# Patient Record
Sex: MALE | Race: WHITE | NOT HISPANIC OR LATINO | URBAN - METROPOLITAN AREA
[De-identification: names, ages, dates, MRNs, and addresses within clinical notes are randomized per-mention and may not be internally consistent; named-entity substitution may affect disease eponyms.]

---

## 2017-01-24 ENCOUNTER — INPATIENT HOSPITAL (OUTPATIENT)
Dept: URBAN - METROPOLITAN AREA HOSPITAL 76 | Facility: HOSPITAL | Age: 57
End: 2017-01-24
Payer: COMMERCIAL

## 2017-01-24 DIAGNOSIS — K29.20 ALCOHOLIC GASTRITIS WITHOUT BLEEDING: ICD-10-CM

## 2017-01-24 DIAGNOSIS — K22.70 BARRETT'S ESOPHAGUS WITHOUT DYSPLASIA: ICD-10-CM

## 2017-01-24 DIAGNOSIS — K92.0 HEMATEMESIS: ICD-10-CM

## 2017-01-24 DIAGNOSIS — K44.9 DIAPHRAGMATIC HERNIA WITHOUT OBSTRUCTION OR GANGRENE: ICD-10-CM

## 2017-01-24 DIAGNOSIS — K22.10 ULCER OF ESOPHAGUS WITHOUT BLEEDING: ICD-10-CM

## 2017-01-24 PROCEDURE — 43235 EGD DIAGNOSTIC BRUSH WASH: CPT | Performed by: INTERNAL MEDICINE

## 2017-01-25 ENCOUNTER — INPATIENT HOSPITAL (OUTPATIENT)
Dept: URBAN - METROPOLITAN AREA HOSPITAL 76 | Facility: HOSPITAL | Age: 57
End: 2017-01-25
Payer: COMMERCIAL

## 2017-01-25 DIAGNOSIS — K22.10 ULCER OF ESOPHAGUS WITHOUT BLEEDING: ICD-10-CM

## 2017-01-25 DIAGNOSIS — K44.9 DIAPHRAGMATIC HERNIA WITHOUT OBSTRUCTION OR GANGRENE: ICD-10-CM

## 2017-01-25 DIAGNOSIS — K29.20 ALCOHOLIC GASTRITIS WITHOUT BLEEDING: ICD-10-CM

## 2017-01-25 DIAGNOSIS — K22.70 BARRETT'S ESOPHAGUS WITHOUT DYSPLASIA: ICD-10-CM

## 2017-01-25 DIAGNOSIS — K92.0 HEMATEMESIS: ICD-10-CM

## 2017-01-25 PROCEDURE — 99231 SBSQ HOSP IP/OBS SF/LOW 25: CPT | Performed by: NURSE PRACTITIONER

## 2019-11-03 ENCOUNTER — HOSPITAL ENCOUNTER (INPATIENT)
Facility: HOSPITAL | Age: 59
LOS: 4 days | Discharge: HOME | End: 2019-11-07
Attending: INTERNAL MEDICINE | Admitting: INTERNAL MEDICINE

## 2019-11-03 DIAGNOSIS — R25.1 TREMORS OF NERVOUS SYSTEM: ICD-10-CM

## 2019-11-03 DIAGNOSIS — F10.10 ALCOHOL CONSUMPTION BINGE DRINKING: ICD-10-CM

## 2019-11-03 DIAGNOSIS — T79.6XXA TRAUMATIC RHABDOMYOLYSIS, INITIAL ENCOUNTER (HCC): Primary | ICD-10-CM

## 2019-11-03 DIAGNOSIS — R11.2 NON-INTRACTABLE VOMITING WITH NAUSEA, UNSPECIFIED VOMITING TYPE: ICD-10-CM

## 2019-11-03 LAB
ALBUMIN SERPL-MCNC: 3.8 G/DL (ref 3.5–5.2)
ALBUMIN/GLOB SERPL: 1.3 G/DL
ALP SERPL-CCNC: 79 U/L (ref 39–117)
ALT SERPL W P-5'-P-CCNC: 69 U/L (ref 1–41)
ANION GAP SERPL CALCULATED.3IONS-SCNC: 19 MMOL/L (ref 5–15)
AST SERPL-CCNC: 111 U/L (ref 1–40)
BASOPHILS # BLD AUTO: 0 10*3/MM3 (ref 0–0.2)
BASOPHILS NFR BLD AUTO: 0.6 % (ref 0–1.5)
BILIRUB SERPL-MCNC: 3.1 MG/DL (ref 0.2–1.2)
BUN BLD-MCNC: 7 MG/DL (ref 6–20)
BUN/CREAT SERPL: 6.3 (ref 7–25)
CALCIUM SPEC-SCNC: 8.1 MG/DL (ref 8.6–10.5)
CHLORIDE SERPL-SCNC: 96 MMOL/L (ref 98–107)
CK SERPL-CCNC: 5059 U/L (ref 20–200)
CO2 SERPL-SCNC: 22 MMOL/L (ref 22–29)
CREAT BLD-MCNC: 1.12 MG/DL (ref 0.76–1.27)
DEPRECATED RDW RBC AUTO: 49.9 FL (ref 37–54)
EOSINOPHIL # BLD AUTO: 0 10*3/MM3 (ref 0–0.4)
EOSINOPHIL NFR BLD AUTO: 0.3 % (ref 0.3–6.2)
ERYTHROCYTE [DISTWIDTH] IN BLOOD BY AUTOMATED COUNT: 14.7 % (ref 12.3–15.4)
ETHANOL UR QL: 0.1 %
GFR SERPL CREATININE-BSD FRML MDRD: 67 ML/MIN/1.73
GLOBULIN UR ELPH-MCNC: 2.9 GM/DL
GLUCOSE BLD-MCNC: 113 MG/DL (ref 65–99)
HCT VFR BLD AUTO: 50.7 % (ref 37.5–51)
HGB BLD-MCNC: 18.2 G/DL (ref 13–17.7)
LYMPHOCYTES # BLD AUTO: 1.1 10*3/MM3 (ref 0.7–3.1)
LYMPHOCYTES NFR BLD AUTO: 12.7 % (ref 19.6–45.3)
MCH RBC QN AUTO: 34.4 PG (ref 26.6–33)
MCHC RBC AUTO-ENTMCNC: 35.9 G/DL (ref 31.5–35.7)
MCV RBC AUTO: 95.8 FL (ref 79–97)
MONOCYTES # BLD AUTO: 0.8 10*3/MM3 (ref 0.1–0.9)
MONOCYTES NFR BLD AUTO: 8.6 % (ref 5–12)
NEUTROPHILS # BLD AUTO: 6.8 10*3/MM3 (ref 1.7–7)
NEUTROPHILS NFR BLD AUTO: 77.8 % (ref 42.7–76)
NRBC BLD AUTO-RTO: 0.2 /100 WBC (ref 0–0.2)
PLATELET # BLD AUTO: 172 10*3/MM3 (ref 140–450)
PMV BLD AUTO: 7.1 FL (ref 6–12)
POTASSIUM BLD-SCNC: 3 MMOL/L (ref 3.5–5.2)
PROT SERPL-MCNC: 6.7 G/DL (ref 6–8.5)
RBC # BLD AUTO: 5.29 10*6/MM3 (ref 4.14–5.8)
SODIUM BLD-SCNC: 137 MMOL/L (ref 136–145)
WBC NRBC COR # BLD: 8.8 10*3/MM3 (ref 3.4–10.8)

## 2019-11-03 PROCEDURE — 99222 1ST HOSP IP/OBS MODERATE 55: CPT | Performed by: PHYSICIAN ASSISTANT

## 2019-11-03 PROCEDURE — 82550 ASSAY OF CK (CPK): CPT | Performed by: NURSE PRACTITIONER

## 2019-11-03 PROCEDURE — 25010000002 ONDANSETRON PER 1 MG

## 2019-11-03 PROCEDURE — 80053 COMPREHEN METABOLIC PANEL: CPT | Performed by: NURSE PRACTITIONER

## 2019-11-03 PROCEDURE — 25010000002 MAGNESIUM SULFATE PER 500 MG OF MAGNESIUM: Performed by: NURSE PRACTITIONER

## 2019-11-03 PROCEDURE — 25010000002 THIAMINE PER 100 MG: Performed by: NURSE PRACTITIONER

## 2019-11-03 PROCEDURE — 80307 DRUG TEST PRSMV CHEM ANLYZR: CPT | Performed by: NURSE PRACTITIONER

## 2019-11-03 PROCEDURE — G0378 HOSPITAL OBSERVATION PER HR: HCPCS

## 2019-11-03 PROCEDURE — 85025 COMPLETE CBC W/AUTO DIFF WBC: CPT | Performed by: NURSE PRACTITIONER

## 2019-11-03 PROCEDURE — 99284 EMERGENCY DEPT VISIT MOD MDM: CPT

## 2019-11-03 PROCEDURE — 25010000002 LORAZEPAM PER 2 MG: Performed by: NURSE PRACTITIONER

## 2019-11-03 RX ORDER — LORAZEPAM 1 MG/1
2 TABLET ORAL
Status: DISCONTINUED | OUTPATIENT
Start: 2019-11-03 | End: 2019-11-07 | Stop reason: HOSPADM

## 2019-11-03 RX ORDER — LORAZEPAM 2 MG/ML
2 INJECTION INTRAMUSCULAR
Status: DISCONTINUED | OUTPATIENT
Start: 2019-11-03 | End: 2019-11-07 | Stop reason: HOSPADM

## 2019-11-03 RX ORDER — ONDANSETRON 2 MG/ML
INJECTION INTRAMUSCULAR; INTRAVENOUS
Status: COMPLETED
Start: 2019-11-03 | End: 2019-11-03

## 2019-11-03 RX ORDER — LORAZEPAM 1 MG/1
1 TABLET ORAL
Status: DISCONTINUED | OUTPATIENT
Start: 2019-11-03 | End: 2019-11-07 | Stop reason: HOSPADM

## 2019-11-03 RX ORDER — LORAZEPAM 2 MG/ML
INJECTION INTRAMUSCULAR
Status: DISPENSED
Start: 2019-11-03 | End: 2019-11-04

## 2019-11-03 RX ORDER — ONDANSETRON 2 MG/ML
4 INJECTION INTRAMUSCULAR; INTRAVENOUS ONCE
Status: COMPLETED | OUTPATIENT
Start: 2019-11-03 | End: 2019-11-03

## 2019-11-03 RX ORDER — SULFAMETHOXAZOLE AND TRIMETHOPRIM 800; 160 MG/1; MG/1
1 TABLET ORAL DAILY
COMMUNITY

## 2019-11-03 RX ORDER — LORAZEPAM 2 MG/ML
0.5 INJECTION INTRAMUSCULAR ONCE
Status: COMPLETED | OUTPATIENT
Start: 2019-11-03 | End: 2019-11-03

## 2019-11-03 RX ORDER — POTASSIUM CHLORIDE 20 MEQ/1
40 TABLET, EXTENDED RELEASE ORAL
Status: DISCONTINUED | OUTPATIENT
Start: 2019-11-04 | End: 2019-11-04

## 2019-11-03 RX ORDER — POTASSIUM CHLORIDE 20 MEQ/1
TABLET, EXTENDED RELEASE ORAL
Status: COMPLETED
Start: 2019-11-03 | End: 2019-11-03

## 2019-11-03 RX ORDER — SODIUM CHLORIDE 0.9 % (FLUSH) 0.9 %
10 SYRINGE (ML) INJECTION AS NEEDED
Status: DISCONTINUED | OUTPATIENT
Start: 2019-11-03 | End: 2019-11-07 | Stop reason: HOSPADM

## 2019-11-03 RX ORDER — LORAZEPAM 2 MG/ML
1 INJECTION INTRAMUSCULAR
Status: DISCONTINUED | OUTPATIENT
Start: 2019-11-03 | End: 2019-11-07 | Stop reason: HOSPADM

## 2019-11-03 RX ADMIN — POTASSIUM CHLORIDE 40 MEQ: 20 TABLET, EXTENDED RELEASE ORAL at 21:20

## 2019-11-03 RX ADMIN — LORAZEPAM 0.5 MG: 2 INJECTION INTRAMUSCULAR at 20:08

## 2019-11-03 RX ADMIN — LORAZEPAM 0.5 MG: 2 INJECTION INTRAMUSCULAR at 21:23

## 2019-11-03 RX ADMIN — ONDANSETRON 4 MG: 2 INJECTION INTRAMUSCULAR; INTRAVENOUS at 23:06

## 2019-11-03 RX ADMIN — SODIUM CHLORIDE 1000 ML: 900 INJECTION, SOLUTION INTRAVENOUS at 20:10

## 2019-11-03 RX ADMIN — FOLIC ACID 1000 ML/HR: 5 INJECTION, SOLUTION INTRAMUSCULAR; INTRAVENOUS; SUBCUTANEOUS at 20:47

## 2019-11-03 RX ADMIN — POTASSIUM CHLORIDE 40 MEQ: 1500 TABLET, EXTENDED RELEASE ORAL at 21:20

## 2019-11-04 PROBLEM — E78.00 ELEVATED CHOLESTEROL: Status: ACTIVE | Noted: 2019-11-04

## 2019-11-04 PROBLEM — K21.9 GERD (GASTROESOPHAGEAL REFLUX DISEASE): Status: ACTIVE | Noted: 2019-11-04

## 2019-11-04 LAB
AMPHET+METHAMPHET UR QL: NEGATIVE
ANION GAP SERPL CALCULATED.3IONS-SCNC: 14 MMOL/L (ref 5–15)
BARBITURATES UR QL SCN: NEGATIVE
BASOPHILS # BLD AUTO: 0.1 10*3/MM3 (ref 0–0.2)
BASOPHILS NFR BLD AUTO: 0.6 % (ref 0–1.5)
BENZODIAZ UR QL SCN: NEGATIVE
BILIRUB UR QL STRIP: ABNORMAL
BUN BLD-MCNC: 8 MG/DL (ref 6–20)
BUN/CREAT SERPL: 7.8 (ref 7–25)
CALCIUM SPEC-SCNC: 7.6 MG/DL (ref 8.6–10.5)
CANNABINOIDS SERPL QL: NEGATIVE
CHLORIDE SERPL-SCNC: 99 MMOL/L (ref 98–107)
CK SERPL-CCNC: 4305 U/L (ref 20–200)
CLARITY UR: CLEAR
CO2 SERPL-SCNC: 25 MMOL/L (ref 22–29)
COCAINE UR QL: NEGATIVE
COLOR UR: ABNORMAL
CREAT BLD-MCNC: 1.02 MG/DL (ref 0.76–1.27)
DEPRECATED RDW RBC AUTO: 48.6 FL (ref 37–54)
EOSINOPHIL # BLD AUTO: 0 10*3/MM3 (ref 0–0.4)
EOSINOPHIL NFR BLD AUTO: 0.2 % (ref 0.3–6.2)
ERYTHROCYTE [DISTWIDTH] IN BLOOD BY AUTOMATED COUNT: 14.3 % (ref 12.3–15.4)
GFR SERPL CREATININE-BSD FRML MDRD: 75 ML/MIN/1.73
GLUCOSE BLD-MCNC: 90 MG/DL (ref 65–99)
GLUCOSE UR STRIP-MCNC: NEGATIVE MG/DL
HCT VFR BLD AUTO: 48.4 % (ref 37.5–51)
HGB BLD-MCNC: 16.6 G/DL (ref 13–17.7)
HGB UR QL STRIP.AUTO: NEGATIVE
KETONES UR QL STRIP: ABNORMAL
LEUKOCYTE ESTERASE UR QL STRIP.AUTO: NEGATIVE
LYMPHOCYTES # BLD AUTO: 0.5 10*3/MM3 (ref 0.7–3.1)
LYMPHOCYTES NFR BLD AUTO: 5.7 % (ref 19.6–45.3)
MCH RBC QN AUTO: 33.3 PG (ref 26.6–33)
MCHC RBC AUTO-ENTMCNC: 34.3 G/DL (ref 31.5–35.7)
MCV RBC AUTO: 96.8 FL (ref 79–97)
METHADONE UR QL SCN: NEGATIVE
MONOCYTES # BLD AUTO: 0.6 10*3/MM3 (ref 0.1–0.9)
MONOCYTES NFR BLD AUTO: 6.2 % (ref 5–12)
NEUTROPHILS # BLD AUTO: 8.2 10*3/MM3 (ref 1.7–7)
NEUTROPHILS NFR BLD AUTO: 87.3 % (ref 42.7–76)
NITRITE UR QL STRIP: NEGATIVE
NRBC BLD AUTO-RTO: 0 /100 WBC (ref 0–0.2)
OPIATES UR QL: NEGATIVE
OXYCODONE UR QL SCN: NEGATIVE
PH UR STRIP.AUTO: 7.5 [PH] (ref 5–8)
PLATELET # BLD AUTO: 142 10*3/MM3 (ref 140–450)
PMV BLD AUTO: 8 FL (ref 6–12)
POTASSIUM BLD-SCNC: 3.7 MMOL/L (ref 3.5–5.2)
PROT UR QL STRIP: NEGATIVE
RBC # BLD AUTO: 5 10*6/MM3 (ref 4.14–5.8)
SODIUM BLD-SCNC: 138 MMOL/L (ref 136–145)
SP GR UR STRIP: 1.02 (ref 1–1.03)
UROBILINOGEN UR QL STRIP: ABNORMAL
WBC NRBC COR # BLD: 9.4 10*3/MM3 (ref 3.4–10.8)

## 2019-11-04 PROCEDURE — 80307 DRUG TEST PRSMV CHEM ANLYZR: CPT | Performed by: PHYSICIAN ASSISTANT

## 2019-11-04 PROCEDURE — 25010000002 PROMETHAZINE PER 50 MG: Performed by: PHYSICIAN ASSISTANT

## 2019-11-04 PROCEDURE — 25010000002 ONDANSETRON PER 1 MG: Performed by: PHYSICIAN ASSISTANT

## 2019-11-04 PROCEDURE — 81003 URINALYSIS AUTO W/O SCOPE: CPT | Performed by: PHYSICIAN ASSISTANT

## 2019-11-04 PROCEDURE — 82550 ASSAY OF CK (CPK): CPT | Performed by: PHYSICIAN ASSISTANT

## 2019-11-04 PROCEDURE — 93005 ELECTROCARDIOGRAM TRACING: CPT | Performed by: PHYSICIAN ASSISTANT

## 2019-11-04 PROCEDURE — 25010000002 LORAZEPAM PER 2 MG: Performed by: INTERNAL MEDICINE

## 2019-11-04 PROCEDURE — 80048 BASIC METABOLIC PNL TOTAL CA: CPT | Performed by: PHYSICIAN ASSISTANT

## 2019-11-04 PROCEDURE — 85025 COMPLETE CBC W/AUTO DIFF WBC: CPT | Performed by: PHYSICIAN ASSISTANT

## 2019-11-04 PROCEDURE — 25010000002 ENOXAPARIN PER 10 MG: Performed by: PHYSICIAN ASSISTANT

## 2019-11-04 PROCEDURE — 99232 SBSQ HOSP IP/OBS MODERATE 35: CPT | Performed by: INTERNAL MEDICINE

## 2019-11-04 RX ORDER — DOCUSATE SODIUM 100 MG/1
100 CAPSULE, LIQUID FILLED ORAL 2 TIMES DAILY PRN
Status: DISCONTINUED | OUTPATIENT
Start: 2019-11-04 | End: 2019-11-07 | Stop reason: HOSPADM

## 2019-11-04 RX ORDER — ACETAMINOPHEN 650 MG/1
650 SUPPOSITORY RECTAL EVERY 4 HOURS PRN
Status: DISCONTINUED | OUTPATIENT
Start: 2019-11-04 | End: 2019-11-07 | Stop reason: HOSPADM

## 2019-11-04 RX ORDER — MAGNESIUM SULFATE HEPTAHYDRATE 40 MG/ML
2 INJECTION, SOLUTION INTRAVENOUS AS NEEDED
Status: DISCONTINUED | OUTPATIENT
Start: 2019-11-04 | End: 2019-11-07 | Stop reason: HOSPADM

## 2019-11-04 RX ORDER — ALUMINA, MAGNESIA, AND SIMETHICONE 2400; 2400; 240 MG/30ML; MG/30ML; MG/30ML
15 SUSPENSION ORAL EVERY 6 HOURS PRN
Status: DISCONTINUED | OUTPATIENT
Start: 2019-11-04 | End: 2019-11-07 | Stop reason: HOSPADM

## 2019-11-04 RX ORDER — SULFAMETHOXAZOLE AND TRIMETHOPRIM 800; 160 MG/1; MG/1
1 TABLET ORAL DAILY
Status: DISCONTINUED | OUTPATIENT
Start: 2019-11-04 | End: 2019-11-07 | Stop reason: HOSPADM

## 2019-11-04 RX ORDER — ONDANSETRON 2 MG/ML
4 INJECTION INTRAMUSCULAR; INTRAVENOUS EVERY 6 HOURS PRN
Status: DISCONTINUED | OUTPATIENT
Start: 2019-11-04 | End: 2019-11-07 | Stop reason: HOSPADM

## 2019-11-04 RX ORDER — SODIUM CHLORIDE 0.9 % (FLUSH) 0.9 %
10 SYRINGE (ML) INJECTION AS NEEDED
Status: DISCONTINUED | OUTPATIENT
Start: 2019-11-04 | End: 2019-11-07 | Stop reason: HOSPADM

## 2019-11-04 RX ORDER — SODIUM CHLORIDE 9 MG/ML
150 INJECTION, SOLUTION INTRAVENOUS CONTINUOUS
Status: DISCONTINUED | OUTPATIENT
Start: 2019-11-04 | End: 2019-11-05

## 2019-11-04 RX ORDER — BISACODYL 10 MG
10 SUPPOSITORY, RECTAL RECTAL DAILY PRN
Status: DISCONTINUED | OUTPATIENT
Start: 2019-11-04 | End: 2019-11-07 | Stop reason: HOSPADM

## 2019-11-04 RX ORDER — MAGNESIUM SULFATE 1 G/100ML
1 INJECTION INTRAVENOUS AS NEEDED
Status: DISCONTINUED | OUTPATIENT
Start: 2019-11-04 | End: 2019-11-07 | Stop reason: HOSPADM

## 2019-11-04 RX ORDER — SODIUM CHLORIDE 0.9 % (FLUSH) 0.9 %
10 SYRINGE (ML) INJECTION EVERY 12 HOURS SCHEDULED
Status: DISCONTINUED | OUTPATIENT
Start: 2019-11-04 | End: 2019-11-07 | Stop reason: HOSPADM

## 2019-11-04 RX ORDER — POTASSIUM CHLORIDE 1.5 G/1.77G
40 POWDER, FOR SOLUTION ORAL AS NEEDED
Status: DISCONTINUED | OUTPATIENT
Start: 2019-11-04 | End: 2019-11-07 | Stop reason: HOSPADM

## 2019-11-04 RX ORDER — ONDANSETRON 4 MG/1
4 TABLET, FILM COATED ORAL EVERY 6 HOURS PRN
Status: DISCONTINUED | OUTPATIENT
Start: 2019-11-04 | End: 2019-11-07 | Stop reason: HOSPADM

## 2019-11-04 RX ORDER — CHOLECALCIFEROL (VITAMIN D3) 125 MCG
5 CAPSULE ORAL NIGHTLY PRN
Status: DISCONTINUED | OUTPATIENT
Start: 2019-11-04 | End: 2019-11-07 | Stop reason: HOSPADM

## 2019-11-04 RX ORDER — POTASSIUM CHLORIDE 20 MEQ/1
40 TABLET, EXTENDED RELEASE ORAL AS NEEDED
Status: DISCONTINUED | OUTPATIENT
Start: 2019-11-04 | End: 2019-11-07 | Stop reason: HOSPADM

## 2019-11-04 RX ORDER — CALCIUM CARBONATE 200(500)MG
2 TABLET,CHEWABLE ORAL 2 TIMES DAILY PRN
Status: DISCONTINUED | OUTPATIENT
Start: 2019-11-04 | End: 2019-11-07 | Stop reason: HOSPADM

## 2019-11-04 RX ORDER — ACETAMINOPHEN 325 MG/1
650 TABLET ORAL EVERY 4 HOURS PRN
Status: DISCONTINUED | OUTPATIENT
Start: 2019-11-04 | End: 2019-11-07 | Stop reason: HOSPADM

## 2019-11-04 RX ORDER — ACETAMINOPHEN 160 MG/5ML
650 SOLUTION ORAL EVERY 4 HOURS PRN
Status: DISCONTINUED | OUTPATIENT
Start: 2019-11-04 | End: 2019-11-07 | Stop reason: HOSPADM

## 2019-11-04 RX ORDER — PANTOPRAZOLE SODIUM 40 MG/1
40 TABLET, DELAYED RELEASE ORAL
Status: DISCONTINUED | OUTPATIENT
Start: 2019-11-04 | End: 2019-11-07 | Stop reason: HOSPADM

## 2019-11-04 RX ADMIN — Medication 10 ML: at 01:12

## 2019-11-04 RX ADMIN — SODIUM CHLORIDE 150 ML/HR: 900 INJECTION, SOLUTION INTRAVENOUS at 14:31

## 2019-11-04 RX ADMIN — SODIUM CHLORIDE 150 ML/HR: 900 INJECTION, SOLUTION INTRAVENOUS at 20:40

## 2019-11-04 RX ADMIN — PROMETHAZINE HYDROCHLORIDE 25 MG: 25 INJECTION INTRAMUSCULAR; INTRAVENOUS at 02:19

## 2019-11-04 RX ADMIN — Medication 10 ML: at 08:36

## 2019-11-04 RX ADMIN — SULFAMETHOXAZOLE AND TRIMETHOPRIM 160 MG: 800; 160 TABLET ORAL at 08:26

## 2019-11-04 RX ADMIN — PANTOPRAZOLE SODIUM 40 MG: 40 TABLET, DELAYED RELEASE ORAL at 13:04

## 2019-11-04 RX ADMIN — SODIUM CHLORIDE 150 ML/HR: 900 INJECTION, SOLUTION INTRAVENOUS at 08:25

## 2019-11-04 RX ADMIN — CALCIUM CARBONATE (ANTACID) CHEW TAB 500 MG 2 TABLET: 500 CHEW TAB at 22:56

## 2019-11-04 RX ADMIN — LORAZEPAM 2 MG: 2 INJECTION, SOLUTION INTRAMUSCULAR; INTRAVENOUS at 08:26

## 2019-11-04 RX ADMIN — LORAZEPAM 2 MG: 2 INJECTION, SOLUTION INTRAMUSCULAR; INTRAVENOUS at 01:59

## 2019-11-04 RX ADMIN — Medication 10 ML: at 20:40

## 2019-11-04 RX ADMIN — ONDANSETRON 4 MG: 2 INJECTION INTRAMUSCULAR; INTRAVENOUS at 01:32

## 2019-11-04 RX ADMIN — ONDANSETRON 4 MG: 2 INJECTION INTRAMUSCULAR; INTRAVENOUS at 08:26

## 2019-11-04 RX ADMIN — ENOXAPARIN SODIUM 40 MG: 40 INJECTION SUBCUTANEOUS at 17:00

## 2019-11-04 RX ADMIN — SODIUM CHLORIDE 150 ML/HR: 900 INJECTION, SOLUTION INTRAVENOUS at 01:11

## 2019-11-04 NOTE — H&P
"      [facility] Medicine Services        Patient Name:  Kevin Hurtado  YOB: 1960  MRN:  [id number]  Admit Date:  11/3/2019    Patient Care Team:  Provider, No Known as PCP - General      History Present Illness     Chief Complaint   Patient presents with   • Vomiting       Mr. Hurtado is a 59 y.o. with a past medical history of alcohol abuse who presents to [facility] 11/3 complaining of vomiting starting today.  He admits to drinking a large bottle of whiskey and big reds over the last two days, but is unsure how much he drank.  He states he woke up this morning and began vomiting and noticing tremors.  He denies hematemesis, abdominal pain, chest pain, diarrhea.  He states his last drink was 12-24 hours ago.  The patient states he has been to rehab in the past, with his last visit approximately 2 years ago.  He is not interested in going back right now.  He states he doesn't drink as much as he used to, but \"it just gave me something to do over the last two days.\"  The patient lives alone and states he works out a lot to keep him from drinking a lot.      In the ED, the patient's labs included the following: Na 137, K 3.0, BUN 7, Cr 1.12, glucose 113, WBC 8.8, hgb 18.2, plts 172, , ALT 69, alk phos 79.  CK 5059.  ETOH 0.103.  The patient was given Zofran, banana bag, 1L NS, ativan and potassium in the ED.  He was admitted for further evaluation and management.     History of Present Illness  Review of Systems   Constitution: Negative for chills, diaphoresis and fever.   Cardiovascular: Negative for chest pain.   Gastrointestinal: Positive for nausea and vomiting. Negative for abdominal pain and diarrhea.   Neurological: Positive for tremors.   Psychiatric/Behavioral: Positive for substance abuse.   All other systems reviewed and are negative.    Personal History     History reviewed. No pertinent past medical history.  History reviewed. No pertinent surgical " history.  No family history on file.  Social History     Tobacco Use   • Smoking status: Not on file   Substance Use Topics   • Alcohol use: Not on file   • Drug use: Not on file     Prior to Admission medications    Not on File     Allergies:  No Known Allergies      Objective     Vital Signs  Temp:  [98.4 °F (36.9 °C)] 98.4 °F (36.9 °C)  Heart Rate:  [98] 98  Resp:  [18] 18  BP: (136-140)/(78-83) 136/83  SpO2:  [98 %] 98 %  on   ;      Body mass index is 33.23 kg/m².    Physical Exam   Constitutional: He is oriented to person, place, and time. He appears well-developed and well-nourished. No distress.   Poor hygiene   HENT:   Head: Normocephalic and atraumatic.   Eyes: EOM are normal. Pupils are equal, round, and reactive to light.   Neck: Normal range of motion. Neck supple.   Cardiovascular: Normal rate, regular rhythm and normal heart sounds. Exam reveals no gallop and no friction rub.   No murmur heard.  Pulmonary/Chest: Effort normal and breath sounds normal. No stridor. No respiratory distress. He has no wheezes.   Abdominal: Soft. Bowel sounds are normal. He exhibits no distension. There is no tenderness. There is no guarding.   Musculoskeletal: Normal range of motion. He exhibits no edema or deformity.   Neurological: He is alert and oriented to person, place, and time.   Skin: Skin is warm and dry. He is not diaphoretic. No erythema.   Psychiatric: He has a normal mood and affect.   Vitals reviewed.      Results Review:  I reviewed the patient's new clinical results.  I reviewed the patient's new imaging results and agree with the interpretation.  I reviewed the patient's other test results and agree with the interpretation  I personally viewed and interpreted the patient's EKG/Telemetry data  Discussed with ED provider.    Results from last 7 days   Lab Units 11/03/19 2009   WBC 10*3/mm3 8.80   HEMOGLOBIN g/dL 18.2*   HEMATOCRIT % 50.7   PLATELETS 10*3/mm3 172     Results from last 7 days   Lab Units  11/03/19 2009   SODIUM mmol/L 137   POTASSIUM mmol/L 3.0*   CHLORIDE mmol/L 96*   CO2 mmol/L 22.0   BUN mg/dL 7   CREATININE mg/dL 1.12   GLUCOSE mg/dL 113*   CALCIUM mg/dL 8.1*     Results from last 7 days   Lab Units 11/03/19 2009   SODIUM mmol/L 137   POTASSIUM mmol/L 3.0*   CHLORIDE mmol/L 96*   CO2 mmol/L 22.0   BUN mg/dL 7   CREATININE mg/dL 1.12   CALCIUM mg/dL 8.1*   BILIRUBIN mg/dL 3.1*   ALK PHOS U/L 79   ALT (SGPT) U/L 69*   AST (SGOT) U/L 111*   GLUCOSE mg/dL 113*         Lab Results   Component Value Date    CALCIUM 8.1 (L) 11/03/2019     No results found for: HGBA1C  No results found for: CHOL, CHLPL, TRIG, HDL, LDL, LDLDIRECT  No results found for: LIPASE          Microbiology Results (last 10 days)     ** No results found for the last 240 hours. **          ECG/EMG Results (most recent)     None                    No radiology results for the last 7 days      Estimated Creatinine Clearance: 84.6 mL/min (by C-G formula based on SCr of 1.12 mg/dL).      Assessment/Plan     Active Hospital Problems    Diagnosis POA   • Traumatic rhabdomyolysis (CMS/HCC) [T79.6XXA] Yes     Acute rhabdomyolysis   - CK 5059, repeat in AM   - UA pending   - , ALT 69, alk phos 79  - ETOH 0.103  - patient given 1L NS and 1 banana bag in ED  - continue IV NS @ 150/hr  - encourage oral intake   - regular diet     Alcohol abuse with acute intoxication  - UDS pending  - encouraged cessation, patient refuses rehab at this point   - CIWA protocol initiated   - patient given Ativan in the ED, continue PRN  - monitor closely     Acute hypokalemia (3.0)  - replaced per protocol  - monitor    Acne  - continue Bactrim     · I discussed the patients findings and my recommendations with patient.  ·     VTE Prophylaxis - Lovenox 40 mg SC daily.      Code Status - Full code.       [facility] Hospitalist Team  11/03/19  11:08 PM

## 2019-11-04 NOTE — PLAN OF CARE
Problem: Patient Care Overview  Goal: Plan of Care Review  Outcome: Ongoing (interventions implemented as appropriate)   11/04/19 5998   Coping/Psychosocial   Plan of Care Reviewed With patient   Plan of Care Review   Progress improving   OTHER   Outcome Summary pt c/o n/v this am, remainder of shift pt sleeping, no c/o pain, n/v, discomfort.     Goal: Individualization and Mutuality  Outcome: Ongoing (interventions implemented as appropriate)    Goal: Discharge Needs Assessment  Outcome: Ongoing (interventions implemented as appropriate)    Goal: Interprofessional Rounds/Family Conf  Outcome: Ongoing (interventions implemented as appropriate)

## 2019-11-04 NOTE — PROGRESS NOTES
Discharge Planning Assessment   Ashish     Patient Name: Kevin Hurtado  MRN: [id number]  Today's Date: 11/4/2019    Admit Date: 11/3/2019    Discharge Needs Assessment     Row Name 11/04/19 1259       Living Environment    Lives With  alone    Current Living Arrangements  home/apartment/condo    Primary Care Provided by  self    Provides Primary Care For  no one    Family Caregiver if Needed  parent(s) MOTHER BLADE    Family Caregiver Names  BLADE       Resource/Environmental Concerns    Transportation Concerns  car, none       Transition Planning    Patient/Family Anticipates Transition to  home    Patient/Family Anticipated Services at Transition  none    Transportation Anticipated  car, drives self       Discharge Needs Assessment    Concerns to be Addressed  substance/tobacco abuse/use    Equipment Currently Used at Home  none    Anticipated Changes Related to Illness  none        Discharge Plan     Row Name 11/04/19 1301       Plan    Plan  FROM HOME WITH POSSIBLE NEED FOR OXYGEN      Patient/Family in Agreement with Plan  unable to assess PATIENT IS VERY DIFFICULT TO AWAKEN AND TALKED TO MOM IN ROOM               Functional Status     Row Name 11/04/19 1256       Functional Status    Usual Activity Tolerance  moderate    Current Activity Tolerance  moderate       Functional Status, IADL    Medications  independent    Meal Preparation  -- PER MOTHER HE MICROWAVES       Mental Status    General Appearance WDL  appearance UNKEMP APPEARANCE              Carol naegele rn  Case management  Office number [phone]  Cell phone [phone]

## 2019-11-04 NOTE — PLAN OF CARE
Problem: Patient Care Overview  Goal: Plan of Care Review  Outcome: Ongoing (interventions implemented as appropriate)   11/04/19 0502   Coping/Psychosocial   Plan of Care Reviewed With patient   Plan of Care Review   Progress no change   OTHER   Outcome Summary Patient was vomitting and having withdrawl symptoms. Was given zofran, phengren, and ativan. Patient has slept since.     Goal: Discharge Needs Assessment  Outcome: Ongoing (interventions implemented as appropriate)

## 2019-11-04 NOTE — PROGRESS NOTES
"      [facility] Medicine Services Daily Progress Note          Hospitalist Team  LOS 0 days      Patient Care Team:  Provider, No Known as PCP - General      Chief Complaint / Subjective  CC:   Chief Complaint   Patient presents with   • Vomiting       S: Patient is sound asleep, a little difficult to arouse, falls right back to sleep.    Brief Synopsis of Hospital Course/HPI  Mr. Hurtado is a 59 y.o. with a past medical history of alcohol abuse who presents to [facility] 11/3 complaining of vomiting starting today.  He admits to drinking a large bottle of whiskey and big reds over the last two days, but is unsure how much he drank.  He states he woke up this morning and began vomiting and noticing tremors.  He denies hematemesis, abdominal pain, chest pain, diarrhea.  He states his last drink was 12-24 hours ago.  The patient states he has been to rehab in the past, with his last visit approximately 2 years ago.  He is not interested in going back right now.  He states he doesn't drink as much as he used to, but \"it just gave me something to do over the last two days.\"  The patient lives alone and states he works out a lot to keep him from drinking a lot.      In the ED, the patient's labs included the following: Na 137, K 3.0, BUN 7, Cr 1.12, glucose 113, WBC 8.8, hgb 18.2, plts 172, , ALT 69, alk phos 79.  CK 5059.  ETOH 0.103.  The patient was given Zofran, banana bag, 1L NS, ativan and potassium in the ED.  He was admitted for further evaluation and management.      ROS:  Review of Systems   Unable to perform ROS: Other (Somnolent)       History reviewed. No pertinent family history.    Past Medical History:   Diagnosis Date   • Elevated cholesterol    • GERD (gastroesophageal reflux disease)        Social History     Socioeconomic History   • Marital status: Single     Spouse name: Not on file   • Number of children: Not on file   • Years of education: Not on file   • Highest " "education level: Not on file   Tobacco Use   • Smoking status: Never Smoker   • Smokeless tobacco: Current User     Types: Snuff   Substance and Sexual Activity   • Alcohol use: Yes     Comment: two bottles of liquor once a month   • Drug use: No   • Sexual activity: Yes     Partners: Female       Objective    Vital Signs:  Temp:  [98.1 °F (36.7 °C)-98.4 °F (36.9 °C)] 98.1 °F (36.7 °C)  Heart Rate:  [88-98] 94  Resp:  [16-18] 16  BP: (124-164)/(74-84) 164/84     Oxygen Therapy  SpO2: 95 %  Pulse Oximetry Type: Intermittent  Device (Oxygen Therapy): nasal cannula  Flow (L/min): 2     Flowsheet Rows      First Filed Value   Admission Height  175.3 cm (69\") Documented at 11/03/2019 1953   Admission Weight  102 kg (225 lb) Documented at 11/03/2019 1953        Intake & Output (last 3 days)       11/01 0701 - 11/02 0700 11/02 0701 - 11/03 0700 11/03 0701 - 11/04 0700 11/04 0701 - 11/05 0700            Stool Unmeasured Occurrence    1 x        Lines, Drains & Airways    Active LDAs     Name:   Placement date:   Placement time:   Site:   Days:    Peripheral IV 11/04/19 0210 Anterior;Right Forearm   11/04/19 0210    Forearm   less than 1                  Physical Exam:  General: well-developed and well-nourished, NAD  HEENT: NC/AT  Heart: RRR. No murmur   Chest: CTAB, no w/r/r, normal respiratory effort  Abdominal: Soft. NT/ND. Bowel sounds present  Musculoskeletal: Normal ROM.  No edema. No calf tenderness.  Neurological: Somnolent  Skin: Skin is warm and dry. No rash    Procedures:       Results Review:     I reviewed the patient's new clinical results.    Results from last 7 days   Lab Units 11/04/19 0250 11/03/19 2009   WBC 10*3/mm3 9.40 8.80   HEMOGLOBIN g/dL 16.6 18.2*   HEMATOCRIT % 48.4 50.7   PLATELETS 10*3/mm3 142 172     Results from last 7 days   Lab Units 11/04/19 0250 11/03/19 2009   SODIUM mmol/L 138 137   POTASSIUM mmol/L 3.7 3.0*   CHLORIDE mmol/L 99 96*   CO2 mmol/L 25.0 22.0   BUN mg/dL 8 7 "   CREATININE mg/dL 1.02 1.12   GLUCOSE mg/dL 90 113*   ALBUMIN g/dL  --  3.80   BILIRUBIN mg/dL  --  3.1*   ALK PHOS U/L  --  79   AST (SGOT) U/L  --  111*   ALT (SGPT) U/L  --  69*   CALCIUM mg/dL 7.6* 8.1*     Cr Clearance Estimated Creatinine Clearance: 94.2 mL/min (by C-G formula based on SCr of 1.02 mg/dL).    Coag       HbA1C No results found for: HGBA1C  Blood Glucose       UA          Microbiology       ABG        EKG  ECG 12 Lead   Preliminary Result   HEART RATE= 90  bpm   RR Interval= 668  ms   FL Interval= 170  ms   P Horizontal Axis= 31  deg   P Front Axis= 34  deg   QRSD Interval= 89  ms   QT Interval= 383  ms   QRS Axis= 2  deg   T Wave Axis= 15  deg   - ABNORMAL ECG -   Sinus rhythm   Consider anteroseptal infarct   Electronically Signed By:    Date and Time of Study: 2019-11-04 07:56:24          Imaging:  No radiology results for the last 7 days            Medication Review:   I have reviewed the patient's current medication list      Scheduled Meds    enoxaparin 40 mg Subcutaneous Q24H   sodium chloride 10 mL Intravenous Q12H   sulfamethoxazole-trimethoprim 1 tablet Oral Daily       Meds Infusions    sodium chloride 150 mL/hr Last Rate: 150 mL/hr (11/04/19 0825)       Meds PRN  •  acetaminophen **OR** acetaminophen **OR** acetaminophen  •  aluminum-magnesium hydroxide-simethicone  •  bisacodyl  •  calcium carbonate  •  docusate sodium  •  LORazepam **OR** LORazepam **OR** LORazepam **OR** LORazepam **OR** LORazepam **OR** LORazepam  •  magnesium hydroxide  •  magnesium sulfate **OR** magnesium sulfate in D5W 1g/100mL (PREMIX)  •  melatonin  •  ondansetron **OR** ondansetron  •  potassium chloride  •  potassium chloride  •  promethazine  •  [COMPLETED] Insert peripheral IV **AND** sodium chloride  •  sodium chloride      Assessment / Plan    Acute rhabdomyolysis  - CK 5059 --> 4305  - Renal function normal  - Continue IV fluids    Alcohol abuse with acute intoxication  - Serum alcohol 0.103  -  Continue CIWA protocol; monitor closely for possible DVT for the next 48 to 72 hours  - Cessation encouraged, patient refused  - UDS pending collection    GERD  - No home medications listed  - Start Protonix    Hyperlipidemia  - No home medications listed    Acne  - Currently on Bactrim as an outpatient; continue same    VTE prophylaxis  - Lovenox 40 mg SC daily    Active Hospital Problems    Diagnosis  POA   • **Traumatic rhabdomyolysis (CMS/HCC) [T79.6XXA]  Yes   • Elevated cholesterol [E78.00]  Unknown   • GERD (gastroesophageal reflux disease) [K21.9]  Unknown   • ETOH abuse [F10.10]  Yes      Resolved Hospital Problems   No resolved problems to display.     Hospital problem list:    Traumatic rhabdomyolysis (CMS/HCC)    ETOH abuse    Elevated cholesterol    GERD (gastroesophageal reflux disease)    PT Recommendation and Plan            Discharge Planning    To be determined      Destination      No service coordination in this encounter.      Durable Medical Equipment      No service coordination in this encounter.      Dialysis/Infusion      No service coordination in this encounter.      Home Medical Care      No service coordination in this encounter.      Therapy      No service coordination in this encounter.      Community Resources      No service coordination in this encounter.            Susi Ortiz MD  11/04/19  10:12 AM

## 2019-11-04 NOTE — ED PROVIDER NOTES
Subjective   Is a 59-year-old gentleman who states he is a binge drinker and over the last 2 days has drank 2 bottles of whiskey and big red.  He states he has had persistent vomiting for the last 12 to 14 hours.  He has tremors.  States this happens when he goes on a binge.-            Review of Systems   Constitutional: Negative for chills, fatigue and fever.   HENT: Negative for congestion, tinnitus and trouble swallowing.    Eyes: Negative for photophobia, discharge and redness.   Respiratory: Negative for cough and shortness of breath.    Cardiovascular: Negative for chest pain and palpitations.   Gastrointestinal: Positive for nausea and vomiting. Negative for abdominal pain and diarrhea.   Genitourinary: Negative for dysuria, frequency and urgency.   Musculoskeletal: Negative for back pain, joint swelling and myalgias.   Skin: Negative for rash.   Neurological: Positive for tremors. Negative for dizziness and headaches.   Psychiatric/Behavioral: Negative for confusion.   All other systems reviewed and are negative.      History reviewed. No pertinent past medical history.    No Known Allergies    History reviewed. No pertinent surgical history.    No family history on file.    Social History     Socioeconomic History   • Marital status: Single     Spouse name: Not on file   • Number of children: Not on file   • Years of education: Not on file   • Highest education level: Not on file           Objective   Physical Exam   Constitutional: He is oriented to person, place, and time. He appears well-developed and well-nourished.   Unkempt and patient has urinated on himself   HENT:   Head: Normocephalic and atraumatic.   Right Ear: External ear normal.   Left Ear: External ear normal.   Nose: Nose normal.   Mouth/Throat: Oropharynx is clear and moist.   Eyes: Conjunctivae and EOM are normal. Pupils are equal, round, and reactive to light.   Neck: Normal range of motion. Neck supple.   Cardiovascular: Normal rate,  "regular rhythm, normal heart sounds and intact distal pulses.   Pulmonary/Chest: Effort normal and breath sounds normal.   Abdominal: Soft. Normal appearance and bowel sounds are normal.   Musculoskeletal: Normal range of motion.   Neurological: He is alert and oriented to person, place, and time. GCS eye subscore is 4. GCS verbal subscore is 5. GCS motor subscore is 6.   Skin: Skin is warm and dry.   Psychiatric: His speech is normal. Judgment and thought content normal. His mood appears anxious. He is agitated. Cognition and memory are normal.   Vitals reviewed.      Procedures           ED Course  ED Course as of Nov 03 2302   Sun Nov 03, 2019 2302 Patient was discussed with Shawanda and admitted to Dr. Ortiz  [KW]   2302 She was told of admission and agreeable to this plan of care.  [KW]      ED Course User Index  [KW] Chitra Ramos, APRN         /83   Pulse 98   Temp 98.4 °F (36.9 °C) (Oral)   Resp 18   Ht 175.3 cm (69\")   Wt 102 kg (225 lb)   SpO2 98%   BMI 33.23 kg/m²   Labs Reviewed   COMPREHENSIVE METABOLIC PANEL - Abnormal; Notable for the following components:       Result Value    Glucose 113 (*)     Potassium 3.0 (*)     Chloride 96 (*)     Calcium 8.1 (*)     ALT (SGPT) 69 (*)     AST (SGOT) 111 (*)     Total Bilirubin 3.1 (*)     BUN/Creatinine Ratio 6.3 (*)     Anion Gap 19.0 (*)     All other components within normal limits    Narrative:     GFR Normal >60  Chronic Kidney Disease <60  Kidney Failure <15   CBC WITH AUTO DIFFERENTIAL - Abnormal; Notable for the following components:    Hemoglobin 18.2 (*)     MCH 34.4 (*)     MCHC 35.9 (*)     Neutrophil % 77.8 (*)     Lymphocyte % 12.7 (*)     All other components within normal limits   CK - Abnormal; Notable for the following components:    Creatine Kinase 5,059 (*)     All other components within normal limits   ETHANOL    Narrative:     Plasma Ethanol Clinical Symptoms:    ETOH (%)               Clinical Symptom  .01-.05       "        No apparent influence  .03-.12              Euphoria, Diminished judgment and attention   .09-.25              Impaired comprehension, Muscle incoordination  .18-.30              Confusion, Staggered gait, Slurred speech  .25-.40              Markedly decreased response to stimuli, unable to stand or                        walk, vomitting, sleep or stupor  .35-.50              Comatose, Anesthesia, Subnormal body temperature     CBC AND DIFFERENTIAL    Narrative:     The following orders were created for panel order CBC & Differential.  Procedure                               Abnormality         Status                     ---------                               -----------         ------                     CBC Auto Differential[[id number]]        Abnormal            Final result                 Please view results for these tests on the individual orders.     Medications   sodium chloride 0.9 % flush 10 mL (not administered)   potassium chloride (K-DUR,KLOR-CON) CR tablet 40 mEq (40 mEq Oral Given 11/3/19 2120)   ondansetron (ZOFRAN) injection 4 mg (not administered)   ondansetron (ZOFRAN) 4 MG/2ML injection  - ADS Override Pull (not administered)   sodium chloride 0.9 % bolus 1,000 mL (0 mL Intravenous Stopped 11/3/19 2219)   multiple vitamin (M.V.I. Adult) 10 mL, thiamine (B-1) 100 mg, folic acid 1 mg, magnesium sulfate 2 g in sodium chloride 0.9 % 1,000 mL infusion (0 mL/hr Intravenous Stopped 11/3/19 2219)   LORazepam (ATIVAN) injection 0.5 mg (0.5 mg Intravenous Given 11/3/19 2008)   LORazepam (ATIVAN) injection 0.5 mg (0.5 mg Intravenous Given 11/3/19 2123)     No radiology results for the last day            MDM  Number of Diagnoses or Management Options  Alcohol consumption binge drinking:   Non-intractable vomiting with nausea, unspecified vomiting type:   Traumatic rhabdomyolysis, initial encounter (CMS/[facility]):   Tremors of nervous system:   Diagnosis management comments: Patient is a 59-year-old male  who had IV established and was given 1 L of normal saline and 1 L of banana bag.  He continued to improve with a little Ativan for the tremors and some Zofran.  He had a p.o. challenge which was successful I went into reevaluated and noticed the tea colored redness of his urine and a CK was added patient was found to be in severe rhabdomyolysis with a CK of over 5000       Amount and/or Complexity of Data Reviewed  Clinical lab tests: reviewed        Final diagnoses:   Traumatic rhabdomyolysis, initial encounter (CMS/[facility])   Non-intractable vomiting with nausea, unspecified vomiting type   Alcohol consumption binge drinking   Tremors of nervous system              Chitra Ramos, APRN  11/03/19 3003

## 2019-11-04 NOTE — PAYOR COMM NOTE
"Patient came through ER as observation on 10/3/2019 and was flipped to inpatient on 10/4/2019.    AUTHORIZATION PENDING:   PLEASE CALL OR FAX DETERMINATION TO CONTACT BELOW. THANK YOU.        Dunia Wu RN MSN  /UR  [facility]  [phone] office  [phone] fax  [email or web address]    [facility]  NPI: [id number]  Tax: 744-      Kevin Wilkerson (59 y.o. Male) 1960  Member ID # [id number]    Date of Birth Social Security Number Address Home Phone MRN    1960  [address] [address] [phone] [id number]    [employer] Marital Status          Faith Single       Admission Date Admission Type Admitting Provider Attending Provider Department, Room/Bed    11/3/19 Emergency Susi Ortiz MD Kapadia, Shefali A, MD [facility] 3C MEDICAL INPATIENT, 355/1    Discharge Date Discharge Disposition Discharge Destination                       Attending Provider:  Susi Ortiz MD    Allergies:  No Known Allergies    Isolation:  None   Infection:  None   Code Status:  CPR    Ht:  175.3 cm (69\")   Wt:  105 kg (231 lb 4.2 oz)    Admission Cmt:  None   Principal Problem:  Traumatic rhabdomyolysis (CMS/[facility]) [T79.6XXA]                 Active Insurance as of 11/3/2019     Primary Coverage     Payor Plan Insurance Group Employer/Plan Group    [id number]     Payor Plan Address Payor Plan Phone Number Payor Plan Fax Number Effective Dates    MAIL STOP:   12/1/2018 - None Entered    [address] [address]       [facility] [id number]       Subscriber Name Subscriber Birth Date Member ID       KEVIN WILKERSON 1960 [id number]                 Emergency Contacts      (Rel.) Home Phone Work Phone Mobile Phone    BLDAE WILKERSON (Mother) [phone] -- [phone]        11/04/19 1129  Inpatient Admission Once    Completed   Level of Care: Telemetry    Diagnosis: Traumatic " rhabdomyolysis, initial encounter (CMS/HCC) [2383400]    Admitting Physician: DEBORAH JOSEPH [[id number]]    Attending Physician: DEBORAH JOSEPH [[id number]]    Certification: I certify that inpatient hospital services are medically necessary for greater than 2 midnights.        11/04/19 1128   11/03/19 8498  Initiate Observation Status Once    Completed   Level of Care: Telemetry    Diagnosis: Traumatic rhabdomyolysis, initial encounter (CMS/HCC) [2822690]    Admitting Physician: DEBORAH JOSEPH [[id number]]    Attending Physician: DEBORAH JOSEPH [[id number]]             DX:  Traumatic rhabdomyolysis (CMS/HCC) ICD-10-CM: T79.6XXA     ETOH abuse ICD-10-CM: F10.10       Criteria Review   Substance-Related Disorders  Clinical Indications for Admission to Inpatient Care  Admission to Inpatient Level of Care for Substance-Related Disorder for Adult is indicated due to ALL of the following[A][B][C](8)(9)(10)(11)(12)(13)(14)(15):   · Patient risk or severity of substance-related disorder is appropriate to inpatient level of care as indicated by the presence of 1 or more of the following[D][E]:   Withdrawal signs related to alcohol, sedative, or opioid with high-risk indicators as indicated by 1 or more of the following[F]:  Severe alcohol or sedative withdrawal that is not manageable at lower level of care is present as indicated by ALL of the following[G](5)(8)(10) CIWA-Ar Calculator:   · Marked signs of withdrawal as indicated by 1 or more of the following:  Grossly visible tremor   Profuse perspiration  Signs of withdrawal that require inpatient treatment as indicated by 1 or more of the following:   · Inadequate response to pharmacotherapy in emergency department or other appropriate lower level of care   · Lower level of care not feasible or appropriate (eg, unavailable or inappropriate to patient condition or treatment history)            History & Physical      Shawanda Mai PA-C at 11/03/19 8631     Attestation  "signed by uSsi Ortiz MD at 11/04/19 1005    Attending Physician Attestation    I have reviewed the mid-level provider documentation, agree with the documentation, medical decision making and treatment plan as outlined by the mid-level provider.                         [facility] Medicine Services        Patient Name:  Kevin Hurtado  YOB: 1960  MRN:  [id number]  Admit Date:  11/3/2019    Patient Care Team:  Provider, No Known as PCP - General      History Present Illness     Chief Complaint   Patient presents with   • Vomiting       Mr. Hurtado is a 59 y.o. with a past medical history of alcohol abuse who presents to [facility] 11/3 complaining of vomiting starting today.  He admits to drinking a large bottle of whiskey and big reds over the last two days, but is unsure how much he drank.  He states he woke up this morning and began vomiting and noticing tremors.  He denies hematemesis, abdominal pain, chest pain, diarrhea.  He states his last drink was 12-24 hours ago.  The patient states he has been to rehab in the past, with his last visit approximately 2 years ago.  He is not interested in going back right now.  He states he doesn't drink as much as he used to, but \"it just gave me something to do over the last two days.\"  The patient lives alone and states he works out a lot to keep him from drinking a lot.      In the ED, the patient's labs included the following: Na 137, K 3.0, BUN 7, Cr 1.12, glucose 113, WBC 8.8, hgb 18.2, plts 172, , ALT 69, alk phos 79.  CK 5059.  ETOH 0.103.  The patient was given Zofran, banana bag, 1L NS, ativan and potassium in the ED.  He was admitted for further evaluation and management.     History of Present Illness  Review of Systems   Constitution: Negative for chills, diaphoresis and fever.   Cardiovascular: Negative for chest pain.   Gastrointestinal: Positive for nausea and vomiting. Negative for abdominal pain and " diarrhea.   Neurological: Positive for tremors.   Psychiatric/Behavioral: Positive for substance abuse.   All other systems reviewed and are negative.    Personal History     History reviewed. No pertinent past medical history.  History reviewed. No pertinent surgical history.  No family history on file.  Social History     Tobacco Use   • Smoking status: Not on file   Substance Use Topics   • Alcohol use: Not on file   • Drug use: Not on file     Prior to Admission medications    Not on File     Allergies:  No Known Allergies      Objective     Vital Signs  Temp:  [98.4 °F (36.9 °C)] 98.4 °F (36.9 °C)  Heart Rate:  [98] 98  Resp:  [18] 18  BP: (136-140)/(78-83) 136/83  SpO2:  [98 %] 98 %  on   ;      Body mass index is 33.23 kg/m².    Physical Exam   Constitutional: He is oriented to person, place, and time. He appears well-developed and well-nourished. No distress.   Poor hygiene   HENT:   Head: Normocephalic and atraumatic.   Eyes: EOM are normal. Pupils are equal, round, and reactive to light.   Neck: Normal range of motion. Neck supple.   Cardiovascular: Normal rate, regular rhythm and normal heart sounds. Exam reveals no gallop and no friction rub.   No murmur heard.  Pulmonary/Chest: Effort normal and breath sounds normal. No stridor. No respiratory distress. He has no wheezes.   Abdominal: Soft. Bowel sounds are normal. He exhibits no distension. There is no tenderness. There is no guarding.   Musculoskeletal: Normal range of motion. He exhibits no edema or deformity.   Neurological: He is alert and oriented to person, place, and time.   Skin: Skin is warm and dry. He is not diaphoretic. No erythema.   Psychiatric: He has a normal mood and affect.   Vitals reviewed.      Results Review:  I reviewed the patient's new clinical results.  I reviewed the patient's new imaging results and agree with the interpretation.  I reviewed the patient's other test results and agree with the interpretation  I personally  viewed and interpreted the patient's EKG/Telemetry data  Discussed with ED provider.    Results from last 7 days   Lab Units 11/03/19 2009   WBC 10*3/mm3 8.80   HEMOGLOBIN g/dL 18.2*   HEMATOCRIT % 50.7   PLATELETS 10*3/mm3 172     Results from last 7 days   Lab Units 11/03/19 2009   SODIUM mmol/L 137   POTASSIUM mmol/L 3.0*   CHLORIDE mmol/L 96*   CO2 mmol/L 22.0   BUN mg/dL 7   CREATININE mg/dL 1.12   GLUCOSE mg/dL 113*   CALCIUM mg/dL 8.1*     Results from last 7 days   Lab Units 11/03/19 2009   SODIUM mmol/L 137   POTASSIUM mmol/L 3.0*   CHLORIDE mmol/L 96*   CO2 mmol/L 22.0   BUN mg/dL 7   CREATININE mg/dL 1.12   CALCIUM mg/dL 8.1*   BILIRUBIN mg/dL 3.1*   ALK PHOS U/L 79   ALT (SGPT) U/L 69*   AST (SGOT) U/L 111*   GLUCOSE mg/dL 113*         Lab Results   Component Value Date    CALCIUM 8.1 (L) 11/03/2019     No results found for: HGBA1C  No results found for: CHOL, CHLPL, TRIG, HDL, LDL, LDLDIRECT  No results found for: LIPASE          Microbiology Results (last 10 days)     ** No results found for the last 240 hours. **          ECG/EMG Results (most recent)     None                    No radiology results for the last 7 days      Estimated Creatinine Clearance: 84.6 mL/min (by C-G formula based on SCr of 1.12 mg/dL).      Assessment/Plan     Active Hospital Problems    Diagnosis POA   • Traumatic rhabdomyolysis (CMS/[facility]) [T79.6XXA] Yes     Acute rhabdomyolysis   - CK 5059, repeat in AM   - UA pending   - , ALT 69, alk phos 79  - ETOH 0.103  - patient given 1L NS and 1 banana bag in ED  - continue IV NS @ 150/hr  - encourage oral intake   - regular diet     Alcohol abuse with acute intoxication  - UDS pending  - encouraged cessation, patient refuses rehab at this point   - CIWA protocol initiated   - patient given Ativan in the ED, continue PRN  - monitor closely     Acute hypokalemia (3.0)  - replaced per protocol  - monitor    Acne  - continue Bactrim     · I discussed the patients findings and  my recommendations with patient.  ·     VTE Prophylaxis - Lovenox 40 mg SC daily.      Code Status - Full code.       Shawanda Mai PA-C  [facility]ist Team  11/03/19  11:08 PM        Electronically signed by Susi Ortiz MD at 11/04/19 1005          Emergency Department Notes      Chitra Ramos, PATRICK at 11/03/19 2047     Attestation signed by Pedro Guillen MD at 11/03/19 1054          For this patient encounter, I reviewed the NP or PA documentation, treatment plan, and medical decision making. Pedro Guillen MD 11/3/2019 11:29 PM                  Subjective   Is a 59-year-old gentleman who states he is a binge drinker and over the last 2 days has drank 2 bottles of whiskey and big red.  He states he has had persistent vomiting for the last 12 to 14 hours.  He has tremors.  States this happens when he goes on a binge.-            Review of Systems   Constitutional: Negative for chills, fatigue and fever.   HENT: Negative for congestion, tinnitus and trouble swallowing.    Eyes: Negative for photophobia, discharge and redness.   Respiratory: Negative for cough and shortness of breath.    Cardiovascular: Negative for chest pain and palpitations.   Gastrointestinal: Positive for nausea and vomiting. Negative for abdominal pain and diarrhea.   Genitourinary: Negative for dysuria, frequency and urgency.   Musculoskeletal: Negative for back pain, joint swelling and myalgias.   Skin: Negative for rash.   Neurological: Positive for tremors. Negative for dizziness and headaches.   Psychiatric/Behavioral: Negative for confusion.   All other systems reviewed and are negative.      History reviewed. No pertinent past medical history.    No Known Allergies    History reviewed. No pertinent surgical history.    No family history on file.    Social History     Socioeconomic History   • Marital status: Single     Spouse name: Not on file   • Number of children: Not on file   • Years of  "education: Not on file   • Highest education level: Not on file           Objective   Physical Exam   Constitutional: He is oriented to person, place, and time. He appears well-developed and well-nourished.   Unkempt and patient has urinated on himself   HENT:   Head: Normocephalic and atraumatic.   Right Ear: External ear normal.   Left Ear: External ear normal.   Nose: Nose normal.   Mouth/Throat: Oropharynx is clear and moist.   Eyes: Conjunctivae and EOM are normal. Pupils are equal, round, and reactive to light.   Neck: Normal range of motion. Neck supple.   Cardiovascular: Normal rate, regular rhythm, normal heart sounds and intact distal pulses.   Pulmonary/Chest: Effort normal and breath sounds normal.   Abdominal: Soft. Normal appearance and bowel sounds are normal.   Musculoskeletal: Normal range of motion.   Neurological: He is alert and oriented to person, place, and time. GCS eye subscore is 4. GCS verbal subscore is 5. GCS motor subscore is 6.   Skin: Skin is warm and dry.   Psychiatric: His speech is normal. Judgment and thought content normal. His mood appears anxious. He is agitated. Cognition and memory are normal.   Vitals reviewed.      Procedures          ED Course  ED Course as of Nov 03 2302   Sun Nov 03, 2019 2302 Patient was discussed with Shawanda and admitted to Dr. Ortiz  [KW]   2302 She was told of admission and agreeable to this plan of care.  [KW]      ED Course User Index  [KW] Chitra Ramos, APRN         /83   Pulse 98   Temp 98.4 °F (36.9 °C) (Oral)   Resp 18   Ht 175.3 cm (69\")   Wt 102 kg (225 lb)   SpO2 98%   BMI 33.23 kg/m²    Labs Reviewed   COMPREHENSIVE METABOLIC PANEL - Abnormal; Notable for the following components:       Result Value    Glucose 113 (*)     Potassium 3.0 (*)     Chloride 96 (*)     Calcium 8.1 (*)     ALT (SGPT) 69 (*)     AST (SGOT) 111 (*)     Total Bilirubin 3.1 (*)     BUN/Creatinine Ratio 6.3 (*)     Anion Gap 19.0 (*)     All " other components within normal limits    Narrative:     GFR Normal >60  Chronic Kidney Disease <60  Kidney Failure <15   CBC WITH AUTO DIFFERENTIAL - Abnormal; Notable for the following components:    Hemoglobin 18.2 (*)     MCH 34.4 (*)     MCHC 35.9 (*)     Neutrophil % 77.8 (*)     Lymphocyte % 12.7 (*)     All other components within normal limits   CK - Abnormal; Notable for the following components:    Creatine Kinase 5,059 (*)     All other components within normal limits   ETHANOL    Narrative:     Plasma Ethanol Clinical Symptoms:    ETOH (%)               Clinical Symptom  .01-.05              No apparent influence  .03-.12              Euphoria, Diminished judgment and attention   .09-.25              Impaired comprehension, Muscle incoordination  .18-.30              Confusion, Staggered gait, Slurred speech  .25-.40              Markedly decreased response to stimuli, unable to stand or                        walk, vomitting, sleep or stupor  .35-.50              Comatose, Anesthesia, Subnormal body temperature     CBC AND DIFFERENTIAL    Narrative:     The following orders were created for panel order CBC & Differential.  Procedure                               Abnormality         Status                     ---------                               -----------         ------                     CBC Auto Differential[[id number]]        Abnormal            Final result                 Please view results for these tests on the individual orders.     Medications   sodium chloride 0.9 % flush 10 mL (not administered)   potassium chloride (K-DUR,KLOR-CON) CR tablet 40 mEq (40 mEq Oral Given 11/3/19 2120)   ondansetron (ZOFRAN) injection 4 mg (not administered)   ondansetron (ZOFRAN) 4 MG/2ML injection  - ADS Override Pull (not administered)   sodium chloride 0.9 % bolus 1,000 mL (0 mL Intravenous Stopped 11/3/19 2219)   multiple vitamin (M.V.I. Adult) 10 mL, thiamine (B-1) 100 mg, folic acid 1 mg, magnesium  sulfate 2 g in sodium chloride 0.9 % 1,000 mL infusion (0 mL/hr Intravenous Stopped 11/3/19 2219)   LORazepam (ATIVAN) injection 0.5 mg (0.5 mg Intravenous Given 11/3/19 2008)   LORazepam (ATIVAN) injection 0.5 mg (0.5 mg Intravenous Given 11/3/19 2123)     No radiology results for the last day            MDM  Number of Diagnoses or Management Options  Alcohol consumption binge drinking:   Non-intractable vomiting with nausea, unspecified vomiting type:   Traumatic rhabdomyolysis, initial encounter (CMS/[facility]):   Tremors of nervous system:   Diagnosis management comments: Patient is a 59-year-old male who had IV established and was given 1 L of normal saline and 1 L of banana bag.  He continued to improve with a little Ativan for the tremors and some Zofran.  He had a p.o. challenge which was successful I went into reevaluated and noticed the tea colored redness of his urine and a CK was added patient was found to be in severe rhabdomyolysis with a CK of over 5000       Amount and/or Complexity of Data Reviewed  Clinical lab tests: reviewed        Final diagnoses:   Traumatic rhabdomyolysis, initial encounter (CMS/[facility])   Non-intractable vomiting with nausea, unspecified vomiting type   Alcohol consumption binge drinking   Tremors of nervous system              Chitra Ramos, PATRICK  11/03/19 2302      Electronically signed by Pedro Guillen MD at 11/03/19 2329     Dunia Acosta RN at 11/03/19 2202        Lab contacted to add CK total and CKMB      Dunia Acosta RN  11/03/19 2202      Electronically signed by Dunia Acosta RN at 11/03/19 2202          Physician Progress Notes (last 24 hours) (Notes from 11/03/19 1135 through 11/04/19 1135)      Susi Ortiz MD at 11/04/19 1012                [facility] Medicine Services Daily Progress Note          Hospitalist Team  LOS 0 days      Patient Care Team:  Provider, No Known as PCP - General      Chief Complaint /  "Subjective  CC:   Chief Complaint   Patient presents with   • Vomiting       S: Patient is sound asleep, a little difficult to arouse, falls right back to sleep.    Brief Synopsis of Hospital Course/HPI  Mr. Hurtado is a 59 y.o. with a past medical history of alcohol abuse who presents to [facility] 11/3 complaining of vomiting starting today.  He admits to drinking a large bottle of whiskey and big reds over the last two days, but is unsure how much he drank.  He states he woke up this morning and began vomiting and noticing tremors.  He denies hematemesis, abdominal pain, chest pain, diarrhea.  He states his last drink was 12-24 hours ago.  The patient states he has been to rehab in the past, with his last visit approximately 2 years ago.  He is not interested in going back right now.  He states he doesn't drink as much as he used to, but \"it just gave me something to do over the last two days.\"  The patient lives alone and states he works out a lot to keep him from drinking a lot.      In the ED, the patient's labs included the following: Na 137, K 3.0, BUN 7, Cr 1.12, glucose 113, WBC 8.8, hgb 18.2, plts 172, , ALT 69, alk phos 79.  CK 5059.  ETOH 0.103.  The patient was given Zofran, banana bag, 1L NS, ativan and potassium in the ED.  He was admitted for further evaluation and management.      ROS:  Review of Systems   Unable to perform ROS: Other (Somnolent)       History reviewed. No pertinent family history.    Past Medical History:   Diagnosis Date   • Elevated cholesterol    • GERD (gastroesophageal reflux disease)        Social History     Socioeconomic History   • Marital status: Single     Spouse name: Not on file   • Number of children: Not on file   • Years of education: Not on file   • Highest education level: Not on file   Tobacco Use   • Smoking status: Never Smoker   • Smokeless tobacco: Current User     Types: Snuff   Substance and Sexual Activity   • Alcohol use: Yes     Comment: " "two bottles of liquor once a month   • Drug use: No   • Sexual activity: Yes     Partners: Female       Objective    Vital Signs:  Temp:  [98.1 °F (36.7 °C)-98.4 °F (36.9 °C)] 98.1 °F (36.7 °C)  Heart Rate:  [88-98] 94  Resp:  [16-18] 16  BP: (124-164)/(74-84) 164/84     Oxygen Therapy  SpO2: 95 %  Pulse Oximetry Type: Intermittent  Device (Oxygen Therapy): nasal cannula  Flow (L/min): 2     Flowsheet Rows      First Filed Value   Admission Height  175.3 cm (69\") Documented at 11/03/2019 1953   Admission Weight  102 kg (225 lb) Documented at 11/03/2019 1953        Intake & Output (last 3 days)       11/01 0701 - 11/02 0700 11/02 0701 - 11/03 0700 11/03 0701 - 11/04 0700 11/04 0701 - 11/05 0700            Stool Unmeasured Occurrence    1 x        Lines, Drains & Airways    Active LDAs     Name:   Placement date:   Placement time:   Site:   Days:    Peripheral IV 11/04/19 0210 Anterior;Right Forearm   11/04/19 0210    Forearm   less than 1                  Physical Exam:  General: well-developed and well-nourished, NAD  HEENT: NC/AT  Heart: RRR. No murmur   Chest: CTAB, no w/r/r, normal respiratory effort  Abdominal: Soft. NT/ND. Bowel sounds present  Musculoskeletal: Normal ROM.  No edema. No calf tenderness.  Neurological: Somnolent  Skin: Skin is warm and dry. No rash    Procedures:       Results Review:     I reviewed the patient's new clinical results.    Results from last 7 days   Lab Units 11/04/19 0250 11/03/19 2009   WBC 10*3/mm3 9.40 8.80   HEMOGLOBIN g/dL 16.6 18.2*   HEMATOCRIT % 48.4 50.7   PLATELETS 10*3/mm3 142 172     Results from last 7 days   Lab Units 11/04/19 0250 11/03/19 2009   SODIUM mmol/L 138 137   POTASSIUM mmol/L 3.7 3.0*   CHLORIDE mmol/L 99 96*   CO2 mmol/L 25.0 22.0   BUN mg/dL 8 7   CREATININE mg/dL 1.02 1.12   GLUCOSE mg/dL 90 113*   ALBUMIN g/dL  --  3.80   BILIRUBIN mg/dL  --  3.1*   ALK PHOS U/L  --  79   AST (SGOT) U/L  --  111*   ALT (SGPT) U/L  --  69*   CALCIUM mg/dL 7.6* " 8.1*     Cr Clearance Estimated Creatinine Clearance: 94.2 mL/min (by C-G formula based on SCr of 1.02 mg/dL).    Coag       HbA1C No results found for: HGBA1C  Blood Glucose       UA          Microbiology       ABG        EKG  ECG 12 Lead   Preliminary Result   HEART RATE= 90  bpm   RR Interval= 668  ms   DE Interval= 170  ms   P Horizontal Axis= 31  deg   P Front Axis= 34  deg   QRSD Interval= 89  ms   QT Interval= 383  ms   QRS Axis= 2  deg   T Wave Axis= 15  deg   - ABNORMAL ECG -   Sinus rhythm   Consider anteroseptal infarct   Electronically Signed By:    Date and Time of Study: 2019-11-04 07:56:24          Imaging:  No radiology results for the last 7 days            Medication Review:   I have reviewed the patient's current medication list      Scheduled Meds    enoxaparin 40 mg Subcutaneous Q24H   sodium chloride 10 mL Intravenous Q12H   sulfamethoxazole-trimethoprim 1 tablet Oral Daily       Meds Infusions    sodium chloride 150 mL/hr Last Rate: 150 mL/hr (11/04/19 0825)       Meds PRN  •  acetaminophen **OR** acetaminophen **OR** acetaminophen  •  aluminum-magnesium hydroxide-simethicone  •  bisacodyl  •  calcium carbonate  •  docusate sodium  •  LORazepam **OR** LORazepam **OR** LORazepam **OR** LORazepam **OR** LORazepam **OR** LORazepam  •  magnesium hydroxide  •  magnesium sulfate **OR** magnesium sulfate in D5W 1g/100mL (PREMIX)  •  melatonin  •  ondansetron **OR** ondansetron  •  potassium chloride  •  potassium chloride  •  promethazine  •  [COMPLETED] Insert peripheral IV **AND** sodium chloride  •  sodium chloride      Assessment / Plan    Acute rhabdomyolysis  - CK 5059 --> 4305  - Renal function normal  - Continue IV fluids    Alcohol abuse with acute intoxication  - Serum alcohol 0.103  - Continue CIWA protocol; monitor closely for possible DVT for the next 48 to 72 hours  - Cessation encouraged, patient refused  - UDS pending collection    GERD  - No home medications listed  - Start  Protonix    Hyperlipidemia  - No home medications listed    Acne  - Currently on Bactrim as an outpatient; continue same    VTE prophylaxis  - Lovenox 40 mg SC daily    Active Hospital Problems    Diagnosis  POA   • **Traumatic rhabdomyolysis (CMS/HCC) [T79.6XXA]  Yes   • Elevated cholesterol [E78.00]  Unknown   • GERD (gastroesophageal reflux disease) [K21.9]  Unknown   • ETOH abuse [F10.10]  Yes      Resolved Hospital Problems   No resolved problems to display.     Hospital problem list:    Traumatic rhabdomyolysis (CMS/HCC)    ETOH abuse    Elevated cholesterol    GERD (gastroesophageal reflux disease)    PT Recommendation and Plan            Discharge Planning    To be determined      Destination      No service coordination in this encounter.      Durable Medical Equipment      No service coordination in this encounter.      Dialysis/Infusion      No service coordination in this encounter.      Home Medical Care      No service coordination in this encounter.      Therapy      No service coordination in this encounter.      Community Resources      No service coordination in this encounter.            Susi Ortiz MD  11/04/19  10:12 AM      Electronically signed by Susi Ortiz MD at 11/04/19 1025       Consult Notes (last 24 hours) (Notes from 11/03/19 1135 through 11/04/19 1135)     No notes of this type exist for this encounter.

## 2019-11-05 LAB
ANION GAP SERPL CALCULATED.3IONS-SCNC: 9 MMOL/L (ref 5–15)
BASOPHILS # BLD AUTO: 0 10*3/MM3 (ref 0–0.2)
BASOPHILS NFR BLD AUTO: 0.5 % (ref 0–1.5)
BUN BLD-MCNC: 8 MG/DL (ref 6–20)
BUN/CREAT SERPL: 8 (ref 7–25)
CALCIUM SPEC-SCNC: 7.2 MG/DL (ref 8.6–10.5)
CHLORIDE SERPL-SCNC: 102 MMOL/L (ref 98–107)
CK SERPL-CCNC: 2519 U/L (ref 20–200)
CO2 SERPL-SCNC: 27 MMOL/L (ref 22–29)
CREAT BLD-MCNC: 1 MG/DL (ref 0.76–1.27)
DEPRECATED RDW RBC AUTO: 51.2 FL (ref 37–54)
EOSINOPHIL # BLD AUTO: 0.2 10*3/MM3 (ref 0–0.4)
EOSINOPHIL NFR BLD AUTO: 2.8 % (ref 0.3–6.2)
ERYTHROCYTE [DISTWIDTH] IN BLOOD BY AUTOMATED COUNT: 14.9 % (ref 12.3–15.4)
GFR SERPL CREATININE-BSD FRML MDRD: 77 ML/MIN/1.73
GLUCOSE BLD-MCNC: 81 MG/DL (ref 65–99)
HCT VFR BLD AUTO: 46.6 % (ref 37.5–51)
HGB BLD-MCNC: 16.2 G/DL (ref 13–17.7)
LYMPHOCYTES # BLD AUTO: 1 10*3/MM3 (ref 0.7–3.1)
LYMPHOCYTES NFR BLD AUTO: 12.1 % (ref 19.6–45.3)
MCH RBC QN AUTO: 33.9 PG (ref 26.6–33)
MCHC RBC AUTO-ENTMCNC: 34.8 G/DL (ref 31.5–35.7)
MCV RBC AUTO: 97.4 FL (ref 79–97)
MONOCYTES # BLD AUTO: 0.6 10*3/MM3 (ref 0.1–0.9)
MONOCYTES NFR BLD AUTO: 7 % (ref 5–12)
NEUTROPHILS # BLD AUTO: 6.5 10*3/MM3 (ref 1.7–7)
NEUTROPHILS NFR BLD AUTO: 77.6 % (ref 42.7–76)
NRBC BLD AUTO-RTO: 0.1 /100 WBC (ref 0–0.2)
PLATELET # BLD AUTO: 93 10*3/MM3 (ref 140–450)
PMV BLD AUTO: 7.8 FL (ref 6–12)
POTASSIUM BLD-SCNC: 3.6 MMOL/L (ref 3.5–5.2)
RBC # BLD AUTO: 4.78 10*6/MM3 (ref 4.14–5.8)
SODIUM BLD-SCNC: 138 MMOL/L (ref 136–145)
WBC NRBC COR # BLD: 8.4 10*3/MM3 (ref 3.4–10.8)

## 2019-11-05 PROCEDURE — 85025 COMPLETE CBC W/AUTO DIFF WBC: CPT | Performed by: PHYSICIAN ASSISTANT

## 2019-11-05 PROCEDURE — 25010000002 ENOXAPARIN PER 10 MG: Performed by: PHYSICIAN ASSISTANT

## 2019-11-05 PROCEDURE — 82550 ASSAY OF CK (CPK): CPT | Performed by: INTERNAL MEDICINE

## 2019-11-05 PROCEDURE — 99232 SBSQ HOSP IP/OBS MODERATE 35: CPT | Performed by: INTERNAL MEDICINE

## 2019-11-05 PROCEDURE — 80048 BASIC METABOLIC PNL TOTAL CA: CPT | Performed by: PHYSICIAN ASSISTANT

## 2019-11-05 RX ADMIN — ENOXAPARIN SODIUM 40 MG: 40 INJECTION SUBCUTANEOUS at 15:43

## 2019-11-05 RX ADMIN — LORAZEPAM 1 MG: 1 TABLET ORAL at 03:38

## 2019-11-05 RX ADMIN — PANTOPRAZOLE SODIUM 40 MG: 40 TABLET, DELAYED RELEASE ORAL at 05:29

## 2019-11-05 RX ADMIN — SODIUM CHLORIDE 150 ML/HR: 900 INJECTION, SOLUTION INTRAVENOUS at 10:36

## 2019-11-05 RX ADMIN — Medication 10 ML: at 22:00

## 2019-11-05 RX ADMIN — Medication 10 ML: at 08:43

## 2019-11-05 RX ADMIN — SULFAMETHOXAZOLE AND TRIMETHOPRIM 160 MG: 800; 160 TABLET ORAL at 08:43

## 2019-11-05 RX ADMIN — SODIUM CHLORIDE 150 ML/HR: 900 INJECTION, SOLUTION INTRAVENOUS at 03:20

## 2019-11-05 NOTE — PLAN OF CARE
Problem: Patient Care Overview  Goal: Discharge Needs Assessment  Outcome: Ongoing (interventions implemented as appropriate)   11/04/19 0535 11/04/19 1259   Discharge Needs Assessment   Readmission Within the Last 30 Days no previous admission in last 30 days --    Concerns to be Addressed --  substance/tobacco abuse/use   Patient/Family Anticipates Transition to --  home   Patient/Family Anticipated Services at Transition --  none   Transportation Concerns --  car, none   Transportation Anticipated --  car, drives self   Anticipated Changes Related to Illness --  none   Disability   Equipment Currently Used at Home --  none

## 2019-11-05 NOTE — PLAN OF CARE
Problem: Skin Injury Risk (Adult)  Goal: Skin Health and Integrity  Outcome: Ongoing (interventions implemented as appropriate)   11/05/19 0574   Skin Injury Risk (Adult)   Skin Health and Integrity achieves outcome

## 2019-11-05 NOTE — PLAN OF CARE
Problem: Skin Injury Risk (Adult)  Goal: Identify Related Risk Factors and Signs and Symptoms  Outcome: Ongoing (interventions implemented as appropriate)   11/05/19 3526   Skin Injury Risk (Adult)   Related Risk Factors (Skin Injury Risk) mobility impaired

## 2019-11-05 NOTE — PLAN OF CARE
Problem: Alcohol Withdrawal Acute, Risk/Actual (Adult)  Goal: Signs and Symptoms of Listed Potential Problems Will be Absent, Minimized or Managed (Alcohol Withdrawal Acute, Risk/Actual)  Outcome: Ongoing (interventions implemented as appropriate)   11/05/19 3034   Goal/Outcome Evaluation   Problems Assessed (Alcohol Withdrawal Syndrome) effects of ANUJ (alcohol withdrawal syndrome)   Problems Present (Alcohol W/D Syndrome) effects of ANUJ (alcohol withdrawal syndrome)

## 2019-11-05 NOTE — PROGRESS NOTES
Discharge Planning Assessment   Ashish     Patient Name: Kevin Hurtado  MRN: [id number]  Today's Date: 11/5/2019    Admit Date: 11/3/2019          Plan    Plan  ANTICIPATE RETURN HOME    Patient/Family in Agreement with Plan  yes PATIENT STATES HE IS ABLE TO RETURN HOME            Carol naegele rn  Case management  Office number [phone]  Cell phone [phone]

## 2019-11-05 NOTE — PAYOR COMM NOTE
"AUTHORIZATION PENDING:   PLEASE CALL OR FAX DETERMINATION TO CONTACT BELOW. THANK YOU.        Dunia Wu RN MSN  /UR  [facility]  [phone] office  [phone] fax  [email or web address]    [facility]  NPI: [id number]  Tax: 754-      Kevin Wilkerson (59 y.o. Male) 1960  Member ID # [id number]      Clinical update as requested.      Date of Birth Social Security Number Address Home Phone MRN    1960  [id number] [place] [address] [phone] [id number]    Gnosticism Marital Status          Mandaeism Single       Admission Date Admission Type Admitting Provider Attending Provider Department, Room/Bed    11/3/19 Emergency Susi Ortiz MD Kapadia, Shefali A, MD [facility] 3C MEDICAL INPATIENT, 355/1    Discharge Date Discharge Disposition Discharge Destination                       Attending Provider:  Susi Ortiz MD    Allergies:  No Known Allergies    Isolation:  None   Infection:  None   Code Status:  CPR    Ht:  175.3 cm (69\")   Wt:  107 kg (235 lb 10.8 oz)    Admission Cmt:  None   Principal Problem:  Traumatic rhabdomyolysis (CMS/[facility]) [T79.6XXA]                 Active Insurance as of 11/3/2019     Primary Coverage     Payor Plan Insurance Group Employer/Plan Group    [id number]     Payor Plan Address Payor Plan Phone Number Payor Plan Fax Number Effective Dates    MAIL STOP:   12/1/2018 - None Entered    [address] [address]       [facility] [address]       Subscriber Name Subscriber Birth Date Member ID       KEVIN WILKERSON 1960 [id number]                 Emergency Contacts      (Rel.) Home Phone Work Phone Mobile Phone    BLADE WILKERSON (Mother) [phone] -- [phone]              CIWA (last day)     Date/Time CIWA-Ar Score    11/05/19 0755  2    11/05/19 0300  6    11/04/19 1901  0        Lab Results (last 24 hours)     Procedure " Component Value Units Date/Time    Basic Metabolic Panel [[id number]]  (Abnormal) Collected:  11/05/19 0419    Specimen:  Blood Updated:  11/05/19 0522     Glucose 81 mg/dL      BUN 8 mg/dL      Creatinine 1.00 mg/dL      Sodium 138 mmol/L      Potassium 3.6 mmol/L      Chloride 102 mmol/L      CO2 27.0 mmol/L      Calcium 7.2 mg/dL      eGFR Non African Amer 77 mL/min/1.73      BUN/Creatinine Ratio 8.0     Anion Gap 9.0 mmol/L     Narrative:       GFR Normal >60  Chronic Kidney Disease <60  Kidney Failure <15    CBC & Differential [[id number]] Collected:  11/05/19 0419    Specimen:  Blood Updated:  11/05/19 0454    Narrative:       The following orders were created for panel order CBC & Differential.  Procedure                               Abnormality         Status                     ---------                               -----------         ------                     CBC Auto Differential[[id number]]        Abnormal            Final result                 Please view results for these tests on the individual orders.    CBC Auto Differential [[id number]]  (Abnormal) Collected:  11/05/19 0419    Specimen:  Blood Updated:  11/05/19 0454     WBC 8.40 10*3/mm3      RBC 4.78 10*6/mm3      Hemoglobin 16.2 g/dL      Hematocrit 46.6 %      MCV 97.4 fL      MCH 33.9 pg      MCHC 34.8 g/dL      RDW 14.9 %      RDW-SD 51.2 fl      MPV 7.8 fL      Platelets 93 10*3/mm3      Neutrophil % 77.6 %      Lymphocyte % 12.1 %      Monocyte % 7.0 %      Eosinophil % 2.8 %      Basophil % 0.5 %      Neutrophils, Absolute 6.50 10*3/mm3      Lymphocytes, Absolute 1.00 10*3/mm3      Monocytes, Absolute 0.60 10*3/mm3      Eosinophils, Absolute 0.20 10*3/mm3      Basophils, Absolute 0.00 10*3/mm3      nRBC 0.1 /100 WBC     Urine Drug Screen - Urine, Clean Catch [[id number]]  (Normal) Collected:  11/04/19 1725    Specimen:  Urine, Clean Catch Updated:  11/04/19 1905     Amphet/Methamphet, Screen Negative     Barbiturates Screen, Urine Negative      Benzodiazepine Screen, Urine Negative     Cocaine Screen, Urine Negative     Opiate Screen Negative     THC, Screen, Urine Negative     Methadone Screen, Urine Negative     Oxycodone Screen, Urine Negative    Narrative:       Negative Thresholds For Drugs Screened:     Amphetamines               500 ng/ml   Barbiturates               200 ng/ml   Benzodiazepines            100 ng/ml   Cocaine                    300 ng/ml   Methadone                  300 ng/ml   Opiates                    300 ng/ml   Oxycodone                  100 ng/ml   THC                        50 ng/ml    The Normal Value for all drugs tested is negative. This report includes final unconfirmed screening results to be used for medical treatment purposes only. Unconfirmed results must not be used for non-medical purposes such as employment or legal testing. Clinical consideration should be applied to any drug of abuse test, particulary when unconfirmed results are used.  All urine drugs of abuse requests without chain of custody are for medical screening purposes only.  False positives are possible.      Urinalysis With Microscopic If Indicated (No Culture) - Urine, Clean Catch [[id number]]  (Abnormal) Collected:  11/04/19 1725    Specimen:  Urine, Clean Catch Updated:  11/04/19 1848     Color, UA Dark Yellow     Appearance, UA Clear     pH, UA 7.5     Specific Gravity, UA 1.017     Glucose, UA Negative     Ketones, UA Trace     Bilirubin, UA Small (1+)     Comment: Confirmation testing is unavailable.  A serum bilirubin is recommended for further assessment.        Blood, UA Negative     Protein, UA Negative     Leuk Esterase, UA Negative     Nitrite, UA Negative     Urobilinogen, UA 1.0 E.U./dL    Narrative:       Urine microscopic not indicated.        Imaging Results (Last 24 Hours)     ** No results found for the last 24 hours. **        ECG/EMG Results (last 24 hours)     ** No results found for the last 24 hours. **        Orders (last 24  hrs)     Start     Ordered    11/05/19 0600  CBC Auto Differential  PROCEDURE ONCE      11/05/19 0002    11/04/19 1600  enoxaparin (LOVENOX) syringe 40 mg  Every 24 Hours      11/04/19 0053    11/04/19 1129  Inpatient Admission  Once      11/04/19 1128    11/04/19 1115  pantoprazole (PROTONIX) EC tablet 40 mg  Every Early Morning      11/04/19 1025    11/04/19 0900  sulfamethoxazole-trimethoprim (BACTRIM DS,SEPTRA DS) 800-160 MG per tablet 160 mg  Daily      11/04/19 0054    11/04/19 0600  Basic Metabolic Panel  Daily      11/04/19 0053    11/04/19 0600  CBC & Differential  Daily      11/04/19 0053    11/04/19 0145  sodium chloride 0.9 % flush 10 mL  Every 12 Hours Scheduled      11/04/19 0053    11/04/19 0145  sodium chloride 0.9 % infusion  Continuous      11/04/19 0053    11/04/19 0054  promethazine (PHENERGAN) IVPB 25 mg  Every 6 Hours PRN      11/04/19 0054    11/04/19 0053  sodium chloride 0.9 % flush 10 mL  As Needed      11/04/19 0053    11/04/19 0053  docusate sodium (COLACE) capsule 100 mg  2 Times Daily PRN      11/04/19 0053    11/04/19 0053  bisacodyl (DULCOLAX) suppository 10 mg  Daily PRN      11/04/19 0053    11/04/19 0053  magnesium hydroxide (MILK OF MAGNESIA) suspension 2400 mg/10mL 10 mL  Daily PRN      11/04/19 0053    11/04/19 0053  ondansetron (ZOFRAN) tablet 4 mg  Every 6 Hours PRN      11/04/19 0053    11/04/19 0053  ondansetron (ZOFRAN) injection 4 mg  Every 6 Hours PRN      11/04/19 0053    11/04/19 0053  calcium carbonate (TUMS) chewable tablet 500 mg (200 mg elemental)  2 Times Daily PRN      11/04/19 0053    11/04/19 0053  aluminum-magnesium hydroxide-simethicone (MAALOX MAX) 400-400-40 MG/5ML suspension 15 mL  Every 6 Hours PRN      11/04/19 0053 11/04/19 0053  acetaminophen (TYLENOL) tablet 650 mg  Every 4 Hours PRN      11/04/19 0053 11/04/19 0053  acetaminophen (TYLENOL) 160 MG/5ML solution 650 mg  Every 4 Hours PRN      11/04/19 0053 11/04/19 0053  acetaminophen (TYLENOL)  suppository 650 mg  Every 4 Hours PRN      11/04/19 0053 11/04/19 0053  potassium chloride (K-DUR,KLOR-CON) CR tablet 40 mEq  As Needed      11/04/19 0053 11/04/19 0053  potassium chloride (KLOR-CON) packet 40 mEq  As Needed      11/04/19 0053 11/04/19 0053  Magnesium Sulfate 2 gram infusion - Mg less than or equal to 1.5 mg/dL  As Needed      11/04/19 0053 11/04/19 0053  Magnesium Sulfate 1 gram infusion - Mg 1.6-1.9 mg/dL  As Needed      11/04/19 0053 11/04/19 0053  melatonin tablet 5 mg  Nightly PRN      11/04/19 0053 11/03/19 2333  LORazepam (ATIVAN) tablet 1 mg  Every 2 Hours PRN      11/03/19 2334 11/03/19 2333  LORazepam (ATIVAN) injection 1 mg  Every 2 Hours PRN      11/03/19 2334 11/03/19 2333  LORazepam (ATIVAN) tablet 2 mg  Every 1 Hour PRN      11/03/19 2334 11/03/19 2333  LORazepam (ATIVAN) injection 2 mg  Every 1 Hour PRN      11/03/19 2334 11/03/19 2333  LORazepam (ATIVAN) injection 2 mg  Every 15 Minutes PRN      11/03/19 2334 11/03/19 2333  LORazepam (ATIVAN) injection 2 mg  Every 15 Minutes PRN      11/03/19 2334 11/03/19 1959  sodium chloride 0.9 % flush 10 mL  As Needed      11/03/19 2000    Unscheduled  Magnesium  As Needed      11/04/19 0053    Unscheduled  Potassium  As Needed      11/04/19 0053    --  sulfamethoxazole-trimethoprim (BACTRIM DS,SEPTRA DS) 800-160 MG per tablet  Daily      11/03/19 2321    Pending  Inpatient Case Management  Consult  Once     Provider:  (Not yet assigned)    Pending    --  SCANNED - TELEMETRY        11/03/19 0000    --  SCANNED - TELEMETRY        11/03/19 0000    --  SCANNED - TELEMETRY        11/03/19 0000    --  SCANNED - TELEMETRY        11/03/19 0000             Physician Progress Notes (last 24 hours) (Notes from 11/04/19 1004 through 11/05/19 1004)      Susi Ortiz MD at 11/04/19 1012                [facility] Medicine Services Daily Progress Note          Hospitalist Team  LOS 0  "days      Patient Care Team:  Provider, No Known as PCP - General      Chief Complaint / Subjective  CC:   Chief Complaint   Patient presents with   • Vomiting       S: Patient is sound asleep, a little difficult to arouse, falls right back to sleep.    Brief Synopsis of Hospital Course/HPI  Mr. Hurtado is a 59 y.o. with a past medical history of alcohol abuse who presents to [facility] 11/3 complaining of vomiting starting today.  He admits to drinking a large bottle of whiskey and big reds over the last two days, but is unsure how much he drank.  He states he woke up this morning and began vomiting and noticing tremors.  He denies hematemesis, abdominal pain, chest pain, diarrhea.  He states his last drink was 12-24 hours ago.  The patient states he has been to rehab in the past, with his last visit approximately 2 years ago.  He is not interested in going back right now.  He states he doesn't drink as much as he used to, but \"it just gave me something to do over the last two days.\"  The patient lives alone and states he works out a lot to keep him from drinking a lot.      In the ED, the patient's labs included the following: Na 137, K 3.0, BUN 7, Cr 1.12, glucose 113, WBC 8.8, hgb 18.2, plts 172, , ALT 69, alk phos 79.  CK 5059.  ETOH 0.103.  The patient was given Zofran, banana bag, 1L NS, ativan and potassium in the ED.  He was admitted for further evaluation and management.      ROS:  Review of Systems   Unable to perform ROS: Other (Somnolent)       History reviewed. No pertinent family history.    Past Medical History:   Diagnosis Date   • Elevated cholesterol    • GERD (gastroesophageal reflux disease)        Social History     Socioeconomic History   • Marital status: Single     Spouse name: Not on file   • Number of children: Not on file   • Years of education: Not on file   • Highest education level: Not on file   Tobacco Use   • Smoking status: Never Smoker   • Smokeless tobacco: Current " "User     Types: Snuff   Substance and Sexual Activity   • Alcohol use: Yes     Comment: two bottles of liquor once a month   • Drug use: No   • Sexual activity: Yes     Partners: Female       Objective    Vital Signs:  Temp:  [98.1 °F (36.7 °C)-98.4 °F (36.9 °C)] 98.1 °F (36.7 °C)  Heart Rate:  [88-98] 94  Resp:  [16-18] 16  BP: (124-164)/(74-84) 164/84     Oxygen Therapy  SpO2: 95 %  Pulse Oximetry Type: Intermittent  Device (Oxygen Therapy): nasal cannula  Flow (L/min): 2     Flowsheet Rows      First Filed Value   Admission Height  175.3 cm (69\") Documented at 11/03/2019 1953   Admission Weight  102 kg (225 lb) Documented at 11/03/2019 1953        Intake & Output (last 3 days)       11/01 0701 - 11/02 0700 11/02 0701 - 11/03 0700 11/03 0701 - 11/04 0700 11/04 0701 - 11/05 0700            Stool Unmeasured Occurrence    1 x        Lines, Drains & Airways    Active LDAs     Name:   Placement date:   Placement time:   Site:   Days:    Peripheral IV 11/04/19 0210 Anterior;Right Forearm   11/04/19 0210    Forearm   less than 1                  Physical Exam:  General: well-developed and well-nourished, NAD  HEENT: NC/AT  Heart: RRR. No murmur   Chest: CTAB, no w/r/r, normal respiratory effort  Abdominal: Soft. NT/ND. Bowel sounds present  Musculoskeletal: Normal ROM.  No edema. No calf tenderness.  Neurological: Somnolent  Skin: Skin is warm and dry. No rash    Procedures:       Results Review:     I reviewed the patient's new clinical results.    Results from last 7 days   Lab Units 11/04/19 0250 11/03/19 2009   WBC 10*3/mm3 9.40 8.80   HEMOGLOBIN g/dL 16.6 18.2*   HEMATOCRIT % 48.4 50.7   PLATELETS 10*3/mm3 142 172     Results from last 7 days   Lab Units 11/04/19 0250 11/03/19 2009   SODIUM mmol/L 138 137   POTASSIUM mmol/L 3.7 3.0*   CHLORIDE mmol/L 99 96*   CO2 mmol/L 25.0 22.0   BUN mg/dL 8 7   CREATININE mg/dL 1.02 1.12   GLUCOSE mg/dL 90 113*   ALBUMIN g/dL  --  3.80   BILIRUBIN mg/dL  --  3.1*   ALK " PHOS U/L  --  79   AST (SGOT) U/L  --  111*   ALT (SGPT) U/L  --  69*   CALCIUM mg/dL 7.6* 8.1*     Cr Clearance Estimated Creatinine Clearance: 94.2 mL/min (by C-G formula based on SCr of 1.02 mg/dL).    Coag       HbA1C No results found for: HGBA1C  Blood Glucose       UA          Microbiology       ABG        EKG  ECG 12 Lead   Preliminary Result   HEART RATE= 90  bpm   RR Interval= 668  ms   MT Interval= 170  ms   P Horizontal Axis= 31  deg   P Front Axis= 34  deg   QRSD Interval= 89  ms   QT Interval= 383  ms   QRS Axis= 2  deg   T Wave Axis= 15  deg   - ABNORMAL ECG -   Sinus rhythm   Consider anteroseptal infarct   Electronically Signed By:    Date and Time of Study: 2019-11-04 07:56:24          Imaging:  No radiology results for the last 7 days            Medication Review:   I have reviewed the patient's current medication list      Scheduled Meds    enoxaparin 40 mg Subcutaneous Q24H   sodium chloride 10 mL Intravenous Q12H   sulfamethoxazole-trimethoprim 1 tablet Oral Daily       Meds Infusions    sodium chloride 150 mL/hr Last Rate: 150 mL/hr (11/04/19 0825)       Meds PRN  •  acetaminophen **OR** acetaminophen **OR** acetaminophen  •  aluminum-magnesium hydroxide-simethicone  •  bisacodyl  •  calcium carbonate  •  docusate sodium  •  LORazepam **OR** LORazepam **OR** LORazepam **OR** LORazepam **OR** LORazepam **OR** LORazepam  •  magnesium hydroxide  •  magnesium sulfate **OR** magnesium sulfate in D5W 1g/100mL (PREMIX)  •  melatonin  •  ondansetron **OR** ondansetron  •  potassium chloride  •  potassium chloride  •  promethazine  •  [COMPLETED] Insert peripheral IV **AND** sodium chloride  •  sodium chloride      Assessment / Plan    Acute rhabdomyolysis  - CK 5059 --> 4305  - Renal function normal  - Continue IV fluids    Alcohol abuse with acute intoxication  - Serum alcohol 0.103  - Continue CIWA protocol; monitor closely for possible DVT for the next 48 to 72 hours  - Cessation encouraged, patient  refused  - UDS pending collection    GERD  - No home medications listed  - Start Protonix    Hyperlipidemia  - No home medications listed    Acne  - Currently on Bactrim as an outpatient; continue same    VTE prophylaxis  - Lovenox 40 mg SC daily    Active Hospital Problems    Diagnosis  POA   • **Traumatic rhabdomyolysis (CMS/HCC) [T79.6XXA]  Yes   • Elevated cholesterol [E78.00]  Unknown   • GERD (gastroesophageal reflux disease) [K21.9]  Unknown   • ETOH abuse [F10.10]  Yes      Resolved Hospital Problems   No resolved problems to display.     Hospital problem list:    Traumatic rhabdomyolysis (CMS/HCC)    ETOH abuse    Elevated cholesterol    GERD (gastroesophageal reflux disease)    PT Recommendation and Plan            Discharge Planning    To be determined      Destination      No service coordination in this encounter.      Durable Medical Equipment      No service coordination in this encounter.      Dialysis/Infusion      No service coordination in this encounter.      Home Medical Care      No service coordination in this encounter.      Therapy      No service coordination in this encounter.      Community Resources      No service coordination in this encounter.            Suis Ortiz MD  11/04/19  10:12 AM      Electronically signed by Susi Ortiz MD at 11/04/19 1025       Consult Notes (last 24 hours) (Notes from 11/04/19 1004 through 11/05/19 1004)     No notes of this type exist for this encounter.           Nursing Assessments (last 24 hours)      Adult PCS Body System     Row Name 11/05/19 0755 11/05/19 0500 11/05/19 0300 11/05/19 0100 11/04/19 2300       Pain/Comfort/Sleep    Presence of Pain  denies pain/discomfort  --  denies pain/discomfort  --  non-verbal indicators absent    Preferred Pain Scale  number (Numeric Rating Pain Scale)  --  --  --  FACES (Schumacher-Baker FACES Pain Rating Scale)    Pain Rating (0-10): Rest  0  --  --  --  --    FACES Pain Rating: Rest  0-->no hurt  --   --  --  --    Sleep/Rest/Relaxation  --  --  awake  --  appears asleep       Pain/Comfort/Sleep Interventions    Sleep/Rest Enhancement  --  --  regular sleep/rest pattern promoted  --  regular sleep/rest pattern promoted       Coping/Psychosocial    Observed Emotional State  accepting  --  --  --  --    Verbalized Emotional State  acceptance  --  --  --  --       Psychosocial Support    Trust Relationship/Rapport  care explained  --  --  --  --       HEENT    HEENT WDL  WDL  --  --  --  --       Mouth/Teeth WDL    Mouth/Teeth WDL  WDL  --  --  --  --       Cognitive    Cognitive/Neuro/Behavioral WDL  WDL  --  --  --  --    Level of Consciousness  Alert  --  --  --  --    Orientation  oriented x 4  --  --  --  --    Speech  clear  --  --  --  --    Mood/Behavior  cooperative  --  --  --  --    Additional Documentation  CIWA-Ar (Alcohol Withdrawal Assessment) (Group)  --  --  --  --       CIWA-Ar (Alcohol Withdrawal Assessment)    Nausea and Vomiting  0-->no nausea and no vomiting  --  0-->no nausea and no vomiting  --  --    Tremor  2  --  3  --  --    Paroxysmal Sweats  0-->no sweat visible  --  2  --  --    Anxiety  0-->no anxiety, at ease  --  1-->mildly anxious  --  --    Agitation  0-->normal activity  --  0-->normal activity  --  --    Tactile Disturbances  0-->none  --  0-->none  --  --    Auditory Disturbances  0-->not present  --  0-->not present  --  --    Visual Disturbances  0-->not present  --  0-->not present  --  --    Headache, Fullness in Head  0-->not present  --  0-->not present  --  --    Orientation and Clouding of Sensorium  0-->oriented and can do serial additions  --  0-->oriented and can do serial additions  --  --    CIWA-Ar Score  2  --  6  --  --       Cognitive Interventions    Communication Enhancement Strategies  call light answered in person  --  --  --  --       Neuro    Additional Documentation  [place] Coma Scale (Group)  --  --  --  --       [place] Coma Scale    Best Eye Response   4-->(E4) spontaneous  --  --  --  --    Best Motor Response  6-->(M6) obeys commands  --  --  --  --    Best Verbal Response  5-->(V5) oriented  --  --  --  --    [place] Coma Scale Score  15  --  --  --  --       General Appearance WDL    General Appearance WDL  WDL  --  --  --  --       Respiratory    Respiratory WDL  WDL  --  WDL  --  WDL    Rhythm/Pattern, Respiratory  pattern regular  --  --  --  --       Oxygen Therapy    Device (Oxygen Therapy)  room air  --  room air  --  room air       Cardiac    Cardiac WDL  WDL  --  WDL  --  WDL    Additional Documentation  ECG (Group)  --  --  --  --       ECG    Telemetry Box Number  --  --  44  --  44       Peripheral Neurovascular    Peripheral Neurovascular WDL  WDL  --  --  --  --       Neurovascular Assessment    Neurovascular Assessment  General  --  --  --  --       Gastrointestinal    GI WDL  WDL  --  --  --  --    Stool Amount  large  --  --  --  --    Last Bowel Movement  11/05/19  --  --  --  --       Genitourinary    Genitourinary WD  WD  --  --  --  --       Skin    Skin [facility]  --  --  --  --       Magdaleno Risk Assessment (If Magdaleno score </= 18, add the appropriate CPG to the care plan)    Sensory Perception  4-->no impairment  --  --  --  --    Moisture  4-->rarely moist  --  --  --  --    Activity  3-->walks occasionally  --  --  --  --    Mobility  3-->slightly limited  --  --  --  --    Nutrition  3-->adequate  --  --  --  --    Friction and Shear  3-->no apparent problem  --  --  --  --    Magdaleno Score  20  --  --  --  --       Musculoskeletal    Musculoskeletal WDL  WDL  --  --  --  --       Peripheral IV 11/04/19 0210 Anterior;Right Forearm    IV Properties Placement Date: 11/04/19 Placement Time: 0210 Hand Hygiene Completed: Yes Size (Gauge): 20 G Orientation: Anterior;Right Location: Forearm Total insertion attempts: 2 Patient Tolerance: Tolerated well    Site Assessment  Clean;Dry;Intact  --  Clean;Dry;Intact  --  Clean;Dry;Intact     Dressing Type  Transparent  --  Transparent  --  Transparent    Line Status  Infusing  --  Infusing  --  Infusing    Dressing Status  Clean;Dry;Intact  --  Clean;Dry;Intact  --  Clean;Dry;Intact    Phlebitis  0-->no symptoms  --  0-->no symptoms  --  --       Sepsis Screening Options    Which Sepsis Screen to be Used?  Adult Sepsis Screen  --  Adult Sepsis Screen  --  --       Adult Sepsis Screening Tool    Previous adult screen positive in last 48 hrs?  no  --  no  --  --    Are 2 or > of the above criteria present?  no: STOP/negative screen  --  --  --  --       Safety    Safety WDL  --  WDL  WDL  WDL  WDL    Safety Factors  --  bed in low position;wheels locked;call light in reach;upper side rails raised x 2;ID band on  bed in low position;wheels locked;call light in reach;upper side rails raised x 2;ID band on  bed in low position;wheels locked;call light in reach;upper side rails raised x 2;ID band on  bed in low position;wheels locked;call light in reach;upper side rails raised x 2;ID band on    All Alarms  --  alarm(s) activated and audible  alarm(s) activated and audible  alarm(s) activated and audible  alarm(s) activated and audible       [facility] High Risk Falls Assessment (If Fall score is >/=13, add the Fall Risk CPG to the care plan)     Fallen in past 6 months  5--> Yes  --  --  --  --    Mental Status  0--> no mental status change  --  --  --  --    Elimination  0--> No elimination issues  --  --  --  --    Mobility  0--> No mobility issues  --  --  --  --    Medications  1--> Sedatives  --  --  --  --    Nurses' Clinical Judgement  5  --  --  --  --    Total Fall Risk Score  11  --  --  --  --       Safety Management    Safety Promotion/Fall Prevention  activity supervised;safety round/check completed;fall prevention program maintained  fall prevention program maintained;safety round/check completed  fall prevention program maintained;safety round/check completed  fall prevention program  maintained;safety round/check completed  fall prevention program maintained;safety round/check completed    Medication Review/Management  medications reviewed  medications reviewed  medications reviewed  medications reviewed  medications reviewed    Environmental Safety Modification  --  assistive device/personal items within reach;clutter free environment maintained  assistive device/personal items within reach;clutter free environment maintained  assistive device/personal items within reach;clutter free environment maintained  assistive device/personal items within reach;clutter free environment maintained       Daily Care    Activity Management  activity encouraged  --  --  --  --    Row Name 11/04/19 2100 11/04/19 1901 11/04/19 1700 11/04/19 1500 11/04/19 1305       Pain/Comfort/Sleep    Presence of Pain  --  denies pain/discomfort  --  denies pain/discomfort  denies pain/discomfort    Preferred Pain Scale  --  number (Numeric Rating Pain Scale)  --  number (Numeric Rating Pain Scale)  number (Numeric Rating Pain Scale)    Pain Rating (0-10): Rest  --  --  --  0  0    Pain Rating (0-10): Activity  --  --  --  0  0    Sleep/Rest/Relaxation  --  awake  --  --  --       Pain/Comfort/Sleep Interventions    Sleep/Rest Enhancement  --  regular sleep/rest pattern promoted  --  --  --       Coping/Psychosocial    Observed Emotional State  --  accepting  --  --  --    Verbalized Emotional State  --  acceptance  --  --  --    Plan of Care Reviewed With  --  patient  --  --  --       Psychosocial Support    Trust Relationship/Rapport  --  care explained  --  --  --       HEENT    HEENT WDL  --  WDL  --  --  --       Mouth/Teeth WDL    Mouth/Teeth WDL  --  WDL  --  --  --       Cognitive    Cognitive/Neuro/Behavioral WDL  --  WDL  --  --  --       CIWA-Ar (Alcohol Withdrawal Assessment)    Nausea and Vomiting  --  0-->no nausea and no vomiting  --  --  --    Tremor  --  0-->no tremor  --  --  --    Paroxysmal Sweats  --   0-->no sweat visible  --  --  --    Anxiety  --  0-->no anxiety, at ease  --  --  --    Agitation  --  0-->normal activity  --  --  --    Tactile Disturbances  --  0-->none  --  --  --    Auditory Disturbances  --  0-->not present  --  --  --    Visual Disturbances  --  0-->not present  --  --  --    Headache, Fullness in Head  --  0-->not present  --  --  --    Orientation and Clouding of Sensorium  --  0-->oriented and can do serial additions  --  --  --    CIWA-Ar Score  --  0  --  --  --       Cognitive Interventions    Communication Enhancement Strategies  --  call light answered in person  --  --  --       Neuro    Additional Documentation  --  [place] Coma Scale (Group)  --  --  --       [place] Coma Scale    Best Eye Response  --  4-->(E4) spontaneous  --  --  --    Best Motor Response  --  6-->(M6) obeys commands  --  --  --    Best Verbal Response  --  5-->(V5) oriented  --  --  --    [place] Coma Scale Score  --  15  --  --  --       General Appearance WDL    General Appearance WDL  --  WDL  --  --  --       Respiratory    Respiratory WDL  --  WDL  --  --  --    Cough And Deep Breathing  --  done independently per patient  --  --  --       Oxygen Therapy    Device (Oxygen Therapy)  --  room air  --  --  --       Cardiac    Cardiac WDL  --  WDL  --  --  --    Additional Documentation  --  ECG (Group)  --  --  --       ECG    Telemetry Box Number  --  44  --  --  --       Peripheral Neurovascular    Peripheral Neurovascular WDL  --  WDL  --  --  --       Gastrointestinal    GI WDL  --  WDL  --  --  --    Last Bowel Movement  --  11/04/19  --  --  --       Genitourinary    Genitourinary WDL  --  WDL  --  --  --       Skin    Skin WDL  --  WDL  --  --  --       Magdaleno Risk Assessment (If Magdaleno score </= 18, add the appropriate CPG to the care plan)    Sensory Perception  --  4-->no impairment  --  --  --    Moisture  --  4-->rarely moist  --  --  --    Activity  --  3-->walks occasionally  --  --  --     Mobility  --  3-->slightly limited  --  --  --    Nutrition  --  3-->adequate  --  --  --    Friction and Shear  --  3-->no apparent problem  --  --  --    Magdaleno Score  --  20  --  --  --       Skin Interventions    Pressure Reduction Techniques  --  frequent weight shift encouraged  --  --  --       Musculoskeletal    Musculoskeletal WDL  --  WDL except  --  --  --    General Mobility  --  generalized weakness  --  --  --       Nutrition    Diet/Nutrition Received  --  regular  --  --  --       Peripheral IV 11/04/19 0210 Anterior;Right Forearm    IV Properties Placement Date: 11/04/19 Placement Time: 0210 Hand Hygiene Completed: Yes Size (Gauge): 20 G Orientation: Anterior;Right Location: Forearm Total insertion attempts: 2 Patient Tolerance: Tolerated well    Site Assessment  --  Clean;Dry;Intact  --  Clean;Dry;Intact  --    Dressing Type  --  Transparent  --  Transparent  --    Line Status  --  Infusing;Flushed  --  Infusing  --    Dressing Status  --  Clean;Dry;Intact  --  Clean;Dry;Intact  --       Sepsis Screening Options    Which Sepsis Screen to be Used?  --  Adult Sepsis Screen  --  --  --       Adult Sepsis Screening Tool    Previous adult screen positive in last 48 hrs?  --  no  --  --  --       Safety    Safety WDL  WDL  WDL  WDL except  WDL except  WDL except    Safety Factors  bed in low position;wheels locked;call light in reach;upper side rails raised x 2;ID band on  bed in low position;wheels locked;call light in reach;upper side rails raised x 2;ID band on  --  --  --    All Alarms  none present  none present  --  --  --       Safety Management    Safety Promotion/Fall Prevention  fall prevention program maintained;safety round/check completed  fall prevention program maintained;safety round/check completed  safety round/check completed  safety round/check completed  safety round/check completed    Medication Review/Management  medications reviewed  medications reviewed  --  --  --     Environmental Safety Modification  assistive device/personal items within reach;clutter free environment maintained  assistive device/personal items within reach;clutter free environment maintained  --  --  --    Row Name 11/04/19 1100                   Pain/Comfort/Sleep    Presence of Pain  denies pain/discomfort        Preferred Pain Scale  number (Numeric Rating Pain Scale)        Pain Rating (0-10): Rest  0        Pain Rating (0-10): Activity  0           Peripheral IV 11/04/19 0210 Anterior;Right Forearm    IV Properties Placement Date: 11/04/19 Placement Time: 0210 Hand Hygiene Completed: Yes Size (Gauge): 20 G Orientation: Anterior;Right Location: Forearm Total insertion attempts: 2 Patient Tolerance: Tolerated well    Site Assessment  Clean;Dry;Intact        Dressing Type  Transparent        Line Status  Infusing        Dressing Status  Clean;Dry;Intact           Safety    Safety WDL  WDL except           Safety Management    Safety Promotion/Fall Prevention  safety round/check completed

## 2019-11-05 NOTE — PLAN OF CARE
Problem: Fall Risk (Adult)  Goal: Absence of Fall  Outcome: Ongoing (interventions implemented as appropriate)   11/05/19 0534   Fall Risk (Adult)   Absence of Fall achieves outcome

## 2019-11-05 NOTE — NURSING NOTE
Patient slept the first part of the evening. Patient had to be woke up for nurse to assess him and give him his evening meds.  Patient didn't show any signs of withdrawal at that time.  While he has been awake, he does show tremors at times. Patient stated to be careful if I go close to him because he has been kicking.  Patient also stated that they keep having nightmares where he is fighting or in a tornado.  Patient asked for his ativan medication.  Will continue to monitor

## 2019-11-05 NOTE — PLAN OF CARE
Problem: Patient Care Overview  Goal: Plan of Care Review  Outcome: Ongoing (interventions implemented as appropriate)   11/05/19 1521   Coping/Psychosocial   Plan of Care Reviewed With patient   OTHER   Outcome Summary Pt. is feeling much better today. Mild tremors. No nausea/vomiting/hallucinations.     Goal: Individualization and Mutuality  Outcome: Ongoing (interventions implemented as appropriate)    Goal: Interprofessional Rounds/Family Conf  Outcome: Ongoing (interventions implemented as appropriate)

## 2019-11-05 NOTE — PLAN OF CARE
Problem: Fall Risk (Adult)  Goal: Identify Related Risk Factors and Signs and Symptoms  Outcome: Ongoing (interventions implemented as appropriate)   11/05/19 4922   Fall Risk (Adult)   Related Risk Factors (Fall Risk) other (see comments)  (patient is going through alcohol withdrawal)

## 2019-11-06 LAB
ANION GAP SERPL CALCULATED.3IONS-SCNC: 11 MMOL/L (ref 5–15)
BASOPHILS # BLD AUTO: 0 10*3/MM3 (ref 0–0.2)
BASOPHILS NFR BLD AUTO: 0.4 % (ref 0–1.5)
BUN BLD-MCNC: 7 MG/DL (ref 6–20)
BUN/CREAT SERPL: 7.6 (ref 7–25)
CALCIUM SPEC-SCNC: 8.3 MG/DL (ref 8.6–10.5)
CHLORIDE SERPL-SCNC: 100 MMOL/L (ref 98–107)
CK SERPL-CCNC: 2360 U/L (ref 20–200)
CO2 SERPL-SCNC: 25 MMOL/L (ref 22–29)
CREAT BLD-MCNC: 0.92 MG/DL (ref 0.76–1.27)
DEPRECATED RDW RBC AUTO: 50.3 FL (ref 37–54)
EOSINOPHIL # BLD AUTO: 0.2 10*3/MM3 (ref 0–0.4)
EOSINOPHIL NFR BLD AUTO: 2.1 % (ref 0.3–6.2)
ERYTHROCYTE [DISTWIDTH] IN BLOOD BY AUTOMATED COUNT: 14.7 % (ref 12.3–15.4)
ETHANOL UR QL: <0.01 %
GFR SERPL CREATININE-BSD FRML MDRD: 84 ML/MIN/1.73
GLUCOSE BLD-MCNC: 80 MG/DL (ref 65–99)
HCT VFR BLD AUTO: 47.6 % (ref 37.5–51)
HGB BLD-MCNC: 16.6 G/DL (ref 13–17.7)
LYMPHOCYTES # BLD AUTO: 1 10*3/MM3 (ref 0.7–3.1)
LYMPHOCYTES NFR BLD AUTO: 12.3 % (ref 19.6–45.3)
MCH RBC QN AUTO: 34 PG (ref 26.6–33)
MCHC RBC AUTO-ENTMCNC: 34.9 G/DL (ref 31.5–35.7)
MCV RBC AUTO: 97.4 FL (ref 79–97)
MONOCYTES # BLD AUTO: 0.6 10*3/MM3 (ref 0.1–0.9)
MONOCYTES NFR BLD AUTO: 7.1 % (ref 5–12)
NEUTROPHILS # BLD AUTO: 6.7 10*3/MM3 (ref 1.7–7)
NEUTROPHILS NFR BLD AUTO: 78.1 % (ref 42.7–76)
NRBC BLD AUTO-RTO: 0.2 /100 WBC (ref 0–0.2)
PLATELET # BLD AUTO: 99 10*3/MM3 (ref 140–450)
PMV BLD AUTO: 7.4 FL (ref 6–12)
POTASSIUM BLD-SCNC: 3.8 MMOL/L (ref 3.5–5.2)
RBC # BLD AUTO: 4.88 10*6/MM3 (ref 4.14–5.8)
SODIUM BLD-SCNC: 136 MMOL/L (ref 136–145)
WBC NRBC COR # BLD: 8.5 10*3/MM3 (ref 3.4–10.8)

## 2019-11-06 PROCEDURE — 93010 ELECTROCARDIOGRAM REPORT: CPT | Performed by: INTERNAL MEDICINE

## 2019-11-06 PROCEDURE — 80307 DRUG TEST PRSMV CHEM ANLYZR: CPT | Performed by: INTERNAL MEDICINE

## 2019-11-06 PROCEDURE — 80048 BASIC METABOLIC PNL TOTAL CA: CPT | Performed by: PHYSICIAN ASSISTANT

## 2019-11-06 PROCEDURE — 25010000002 ENOXAPARIN PER 10 MG: Performed by: PHYSICIAN ASSISTANT

## 2019-11-06 PROCEDURE — 85025 COMPLETE CBC W/AUTO DIFF WBC: CPT | Performed by: PHYSICIAN ASSISTANT

## 2019-11-06 PROCEDURE — 99232 SBSQ HOSP IP/OBS MODERATE 35: CPT | Performed by: INTERNAL MEDICINE

## 2019-11-06 PROCEDURE — 82550 ASSAY OF CK (CPK): CPT | Performed by: INTERNAL MEDICINE

## 2019-11-06 RX ORDER — CETIRIZINE HYDROCHLORIDE 10 MG/1
10 TABLET ORAL DAILY
Status: DISCONTINUED | OUTPATIENT
Start: 2019-11-06 | End: 2019-11-07 | Stop reason: HOSPADM

## 2019-11-06 RX ORDER — ECHINACEA PURPUREA EXTRACT 125 MG
2 TABLET ORAL AS NEEDED
Status: DISCONTINUED | OUTPATIENT
Start: 2019-11-06 | End: 2019-11-07 | Stop reason: HOSPADM

## 2019-11-06 RX ADMIN — PANTOPRAZOLE SODIUM 40 MG: 40 TABLET, DELAYED RELEASE ORAL at 05:40

## 2019-11-06 RX ADMIN — Medication 10 ML: at 20:22

## 2019-11-06 RX ADMIN — CETIRIZINE HYDROCHLORIDE 10 MG: 10 TABLET, FILM COATED ORAL at 16:12

## 2019-11-06 RX ADMIN — Medication 10 ML: at 08:09

## 2019-11-06 RX ADMIN — ENOXAPARIN SODIUM 40 MG: 40 INJECTION SUBCUTANEOUS at 16:12

## 2019-11-06 RX ADMIN — MELATONIN TAB 5 MG 5 MG: 5 TAB at 03:19

## 2019-11-06 RX ADMIN — SULFAMETHOXAZOLE AND TRIMETHOPRIM 160 MG: 800; 160 TABLET ORAL at 08:09

## 2019-11-06 NOTE — PLAN OF CARE
Problem: Fall Risk (Adult)  Goal: Identify Related Risk Factors and Signs and Symptoms  Outcome: Ongoing (interventions implemented as appropriate)   11/06/19 1412   Fall Risk (Adult)   Related Risk Factors (Fall Risk) homeostatic imbalance   Signs and Symptoms (Fall Risk) presence of risk factors     Goal: Absence of Fall  Outcome: Ongoing (interventions implemented as appropriate)   11/06/19 1412   Fall Risk (Adult)   Absence of Fall making progress toward outcome       Problem: Skin Injury Risk (Adult)  Goal: Identify Related Risk Factors and Signs and Symptoms  Outcome: Ongoing (interventions implemented as appropriate)   11/06/19 1412   Skin Injury Risk (Adult)   Related Risk Factors (Skin Injury Risk) fluid intake inadequate;tissue perfusion altered     Goal: Skin Health and Integrity  Outcome: Ongoing (interventions implemented as appropriate)   11/06/19 1412   Skin Injury Risk (Adult)   Skin Health and Integrity making progress toward outcome       Problem: Alcohol Withdrawal Acute, Risk/Actual (Adult)  Goal: Signs and Symptoms of Listed Potential Problems Will be Absent, Minimized or Managed (Alcohol Withdrawal Acute, Risk/Actual)  Outcome: Ongoing (interventions implemented as appropriate)  At this time, no s/s rt to etoh at this time.   11/06/19 1412   Goal/Outcome Evaluation   Problems Assessed (Alcohol Withdrawal Syndrome) other (see comments)   Problems Present (Alcohol W/D Syndrome) none

## 2019-11-06 NOTE — PROGRESS NOTES
"      [facility] Medicine Services Daily Progress Note          Hospitalist Team  LOS 1 days      Patient Care Team:  Provider, No Known as PCP - General      Chief Complaint / Subjective  CC:   Chief Complaint   Patient presents with   • Vomiting       S: Patient is feeling much better, states his urine is normal color light and yellow.  He is requested to stop IV fluids because they have interfered with his appetite and make him feel bloated.    Brief Synopsis of Hospital Course/HPI  Mr. Hurtado is a 59 y.o. with a past medical history of alcohol abuse who presents to [facility] 11/3 complaining of vomiting starting today.  He admits to drinking a large bottle of whiskey and big reds over the last two days, but is unsure how much he drank.  He states he woke up this morning and began vomiting and noticing tremors.  He denies hematemesis, abdominal pain, chest pain, diarrhea.  He states his last drink was 12-24 hours ago.  The patient states he has been to rehab in the past, with his last visit approximately 2 years ago.  He is not interested in going back right now.  He states he doesn't drink as much as he used to, but \"it just gave me something to do over the last two days.\"  The patient lives alone and states he works out a lot to keep him from drinking a lot.      In the ED, the patient's labs included the following: Na 137, K 3.0, BUN 7, Cr 1.12, glucose 113, WBC 8.8, hgb 18.2, plts 172, , ALT 69, alk phos 79.  CK 5059.  ETOH 0.103.  The patient was given Zofran, banana bag, 1L NS, ativan and potassium in the ED.  He was admitted for further evaluation and management.      ROS:  Review of Systems   Constitutional: Negative for chills and fever.   Gastrointestinal: Negative for nausea and vomiting.   Genitourinary: Negative for difficulty urinating, dysuria and hematuria.   Neurological: Positive for dizziness and headaches.   Psychiatric/Behavioral: Negative for agitation and " "confusion.   All other systems reviewed and are negative.      History reviewed. No pertinent family history.    Past Medical History:   Diagnosis Date   • Elevated cholesterol    • GERD (gastroesophageal reflux disease)        Social History     Socioeconomic History   • Marital status: Single     Spouse name: Not on file   • Number of children: Not on file   • Years of education: Not on file   • Highest education level: Not on file   Tobacco Use   • Smoking status: Never Smoker   • Smokeless tobacco: Current User     Types: Snuff   Substance and Sexual Activity   • Alcohol use: Yes     Comment: two bottles of liquor once a month   • Drug use: No   • Sexual activity: Yes     Partners: Female       Objective    Vital Signs:  Temp:  [98 °F (36.7 °C)-98.6 °F (37 °C)] 98.1 °F (36.7 °C)  Heart Rate:  [] 105  Resp:  [16-18] 17  BP: (122-153)/(76-84) 153/84     Oxygen Therapy  SpO2: 96 %  Pulse Oximetry Type: Intermittent  Device (Oxygen Therapy): room air  Flow (L/min): 2     Flowsheet Rows      First Filed Value   Admission Height  175.3 cm (69\") Documented at 11/03/2019 1953   Admission Weight  102 kg (225 lb) Documented at 11/03/2019 1953        Intake & Output (last 3 days)       11/03 0701 - 11/04 0700 11/04 0701 - 11/05 0700 11/05 0701 - 11/06 0700    P.O.  480     I.V. (mL/kg)  3330.3 (31.1) 1759 (16.4)    Total Intake(mL/kg)  3810.3 (35.6) 1759 (16.4)    Urine (mL/kg/hr)  950 (0.4) 1575 (0.9)    Total Output  950 1575    Net  +2860.3 +184           Stool Unmeasured Occurrence  1 x         Lines, Drains & Airways    Active LDAs     Name:   Placement date:   Placement time:   Site:   Days:    Peripheral IV 11/04/19 0210 Anterior;Right Forearm   11/04/19 0210    Forearm   less than 1                  Physical Exam:  General: well-developed and well-nourished, NAD  HEENT: NC/AT, EOMI, PERRLA  Heart: RRR. No murmur   Chest: CTAB, no w/r/r, normal respiratory effort  Abdominal: Soft. NT/ND. Bowel sounds " present  Musculoskeletal: Normal ROM.  No edema. No calf tenderness.  Neurological: AAOx3, no focal deficits  Skin: Skin is warm and dry. No rash  Psychiatric: Normal mood and affect.    Procedures:       Results Review:     I reviewed the patient's new clinical results.    Results from last 7 days   Lab Units 11/05/19 0419 11/04/19 0250 11/03/19 2009   WBC 10*3/mm3 8.40 9.40 8.80   HEMOGLOBIN g/dL 16.2 16.6 18.2*   HEMATOCRIT % 46.6 48.4 50.7   PLATELETS 10*3/mm3 93* 142 172     Results from last 7 days   Lab Units 11/05/19 0419 11/04/19 0250 11/03/19 2009   SODIUM mmol/L 138 138 137   POTASSIUM mmol/L 3.6 3.7 3.0*   CHLORIDE mmol/L 102 99 96*   CO2 mmol/L 27.0 25.0 22.0   BUN mg/dL 8 8 7   CREATININE mg/dL 1.00 1.02 1.12   GLUCOSE mg/dL 81 90 113*   ALBUMIN g/dL  --   --  3.80   BILIRUBIN mg/dL  --   --  3.1*   ALK PHOS U/L  --   --  79   AST (SGOT) U/L  --   --  111*   ALT (SGPT) U/L  --   --  69*   CALCIUM mg/dL 7.2* 7.6* 8.1*     Cr Clearance Estimated Creatinine Clearance: 97 mL/min (by C-G formula based on SCr of 1 mg/dL).    Coag       HbA1C No results found for: HGBA1C  Blood Glucose       UA    Results from last 7 days   Lab Units 11/04/19  1725   NITRITE UA  Negative       Microbiology       ABG        EKG  ECG 12 Lead   Preliminary Result   HEART RATE= 90  bpm   RR Interval= 668  ms   WV Interval= 170  ms   P Horizontal Axis= 31  deg   P Front Axis= 34  deg   QRSD Interval= 89  ms   QT Interval= 383  ms   QRS Axis= 2  deg   T Wave Axis= 15  deg   - ABNORMAL ECG -   Sinus rhythm   Consider anteroseptal infarct   Electronically Signed By:    Date and Time of Study: 2019-11-04 07:56:24          Imaging:  No radiology results for the last 7 days            Medication Review:   I have reviewed the patient's current medication list      Scheduled Meds    enoxaparin 40 mg Subcutaneous Q24H   pantoprazole 40 mg Oral Q AM   sodium chloride 10 mL Intravenous Q12H   sulfamethoxazole-trimethoprim 1 tablet Oral  Daily       Meds Infusions    sodium chloride 150 mL/hr Last Rate: 150 mL/hr (11/05/19 1036)       Meds PRN  •  acetaminophen **OR** acetaminophen **OR** acetaminophen  •  aluminum-magnesium hydroxide-simethicone  •  bisacodyl  •  calcium carbonate  •  docusate sodium  •  LORazepam **OR** LORazepam **OR** LORazepam **OR** LORazepam **OR** LORazepam **OR** LORazepam  •  magnesium hydroxide  •  magnesium sulfate **OR** magnesium sulfate in D5W 1g/100mL (PREMIX)  •  melatonin  •  ondansetron **OR** ondansetron  •  potassium chloride  •  potassium chloride  •  promethazine  •  [COMPLETED] Insert peripheral IV **AND** sodium chloride  •  sodium chloride      Assessment / Plan    Acute rhabdomyolysis  - CK 5059 --> 4305 --> 2519  - Renal function normal  - Patient requesting discontinuation of IV fluids, encouraged him to continue p.o. intake of water and non-caffeinated drinks.  - Recheck CK in a.m.    Alcohol abuse with acute intoxication  - Serum alcohol 0.103  - Continue CIWA protocol; monitor closely for possible DVT for the next 48 to 72 hours  - Cessation encouraged, patient refused  - UDS negative    GERD  - No home medications listed  - Start Protonix    Hyperlipidemia  - No home medications listed    Acne  - Currently on Bactrim as an outpatient; continue same    VTE prophylaxis  - Lovenox 40 mg SC daily    Active Hospital Problems    Diagnosis  POA   • **Traumatic rhabdomyolysis (CMS/HCC) [T79.6XXA]  Yes   • Elevated cholesterol [E78.00]  Unknown   • GERD (gastroesophageal reflux disease) [K21.9]  Unknown   • ETOH abuse [F10.10]  Yes      Resolved Hospital Problems   No resolved problems to display.     Hospital problem list:    Traumatic rhabdomyolysis (CMS/HCC)    ETOH abuse    Elevated cholesterol    GERD (gastroesophageal reflux disease)    Discharge Planning    To be determined      Destination      No service coordination in this encounter.      Durable Medical Equipment      No service coordination in  this encounter.      Dialysis/Infusion      No service coordination in this encounter.      Home Medical Care      No service coordination in this encounter.      Therapy      No service coordination in this encounter.      Community Resources      No service coordination in this encounter.            Susi Ortiz MD  11/05/19  11:15 PM

## 2019-11-06 NOTE — PLAN OF CARE
Problem: Patient Care Overview  Goal: Plan of Care Review  Outcome: Ongoing (interventions implemented as appropriate)   11/06/19 8680   Coping/Psychosocial   Plan of Care Reviewed With patient   OTHER   Outcome Summary Patient states he is feeling better today with an increased appitite. He was sitting up in the chair and states he does not have any tremors. Will continue to monitor patient     Goal: Individualization and Mutuality  Outcome: Ongoing (interventions implemented as appropriate)    Goal: Interprofessional Rounds/Family Conf  Outcome: Ongoing (interventions implemented as appropriate)

## 2019-11-07 VITALS
RESPIRATION RATE: 17 BRPM | OXYGEN SATURATION: 98 % | DIASTOLIC BLOOD PRESSURE: 81 MMHG | SYSTOLIC BLOOD PRESSURE: 135 MMHG | HEIGHT: 69 IN | TEMPERATURE: 97.8 F | BODY MASS INDEX: 32.91 KG/M2 | HEART RATE: 82 BPM | WEIGHT: 222.22 LBS

## 2019-11-07 PROBLEM — T79.6XXA TRAUMATIC RHABDOMYOLYSIS (HCC): Status: RESOLVED | Noted: 2019-11-03 | Resolved: 2019-11-07

## 2019-11-07 PROBLEM — F10.10 ETOH ABUSE: Status: RESOLVED | Noted: 2019-11-03 | Resolved: 2019-11-07

## 2019-11-07 LAB
ANION GAP SERPL CALCULATED.3IONS-SCNC: 13 MMOL/L (ref 5–15)
BASOPHILS # BLD AUTO: 0 10*3/MM3 (ref 0–0.2)
BASOPHILS NFR BLD AUTO: 0.3 % (ref 0–1.5)
BUN BLD-MCNC: 8 MG/DL (ref 6–20)
BUN/CREAT SERPL: 8 (ref 7–25)
CALCIUM SPEC-SCNC: 8.5 MG/DL (ref 8.6–10.5)
CHLORIDE SERPL-SCNC: 100 MMOL/L (ref 98–107)
CK SERPL-CCNC: 1213 U/L (ref 20–200)
CO2 SERPL-SCNC: 25 MMOL/L (ref 22–29)
CREAT BLD-MCNC: 1 MG/DL (ref 0.76–1.27)
DEPRECATED RDW RBC AUTO: 49 FL (ref 37–54)
EOSINOPHIL # BLD AUTO: 0.2 10*3/MM3 (ref 0–0.4)
EOSINOPHIL NFR BLD AUTO: 2 % (ref 0.3–6.2)
ERYTHROCYTE [DISTWIDTH] IN BLOOD BY AUTOMATED COUNT: 14.5 % (ref 12.3–15.4)
GFR SERPL CREATININE-BSD FRML MDRD: 77 ML/MIN/1.73
GLUCOSE BLD-MCNC: 72 MG/DL (ref 65–99)
HCT VFR BLD AUTO: 50.2 % (ref 37.5–51)
HGB BLD-MCNC: 17.6 G/DL (ref 13–17.7)
LYMPHOCYTES # BLD AUTO: 1.1 10*3/MM3 (ref 0.7–3.1)
LYMPHOCYTES NFR BLD AUTO: 12.5 % (ref 19.6–45.3)
MCH RBC QN AUTO: 33.9 PG (ref 26.6–33)
MCHC RBC AUTO-ENTMCNC: 35.1 G/DL (ref 31.5–35.7)
MCV RBC AUTO: 96.4 FL (ref 79–97)
MONOCYTES # BLD AUTO: 0.7 10*3/MM3 (ref 0.1–0.9)
MONOCYTES NFR BLD AUTO: 8.2 % (ref 5–12)
NEUTROPHILS # BLD AUTO: 6.5 10*3/MM3 (ref 1.7–7)
NEUTROPHILS NFR BLD AUTO: 77 % (ref 42.7–76)
NRBC BLD AUTO-RTO: 0.2 /100 WBC (ref 0–0.2)
PLATELET # BLD AUTO: 116 10*3/MM3 (ref 140–450)
PMV BLD AUTO: 8 FL (ref 6–12)
POTASSIUM BLD-SCNC: 3.6 MMOL/L (ref 3.5–5.2)
RBC # BLD AUTO: 5.21 10*6/MM3 (ref 4.14–5.8)
SODIUM BLD-SCNC: 138 MMOL/L (ref 136–145)
WBC NRBC COR # BLD: 8.5 10*3/MM3 (ref 3.4–10.8)

## 2019-11-07 PROCEDURE — 80048 BASIC METABOLIC PNL TOTAL CA: CPT | Performed by: PHYSICIAN ASSISTANT

## 2019-11-07 PROCEDURE — 99239 HOSP IP/OBS DSCHRG MGMT >30: CPT | Performed by: INTERNAL MEDICINE

## 2019-11-07 PROCEDURE — 82550 ASSAY OF CK (CPK): CPT | Performed by: INTERNAL MEDICINE

## 2019-11-07 PROCEDURE — 85025 COMPLETE CBC W/AUTO DIFF WBC: CPT | Performed by: PHYSICIAN ASSISTANT

## 2019-11-07 RX ADMIN — CETIRIZINE HYDROCHLORIDE 10 MG: 10 TABLET, FILM COATED ORAL at 08:36

## 2019-11-07 RX ADMIN — Medication 10 ML: at 08:36

## 2019-11-07 RX ADMIN — SALINE NASAL SPRAY 2 SPRAY: 1.5 SOLUTION NASAL at 14:02

## 2019-11-07 RX ADMIN — SULFAMETHOXAZOLE AND TRIMETHOPRIM 160 MG: 800; 160 TABLET ORAL at 08:36

## 2019-11-07 RX ADMIN — ONDANSETRON HYDROCHLORIDE 4 MG: 4 TABLET, FILM COATED ORAL at 14:06

## 2019-11-07 RX ADMIN — PANTOPRAZOLE SODIUM 40 MG: 40 TABLET, DELAYED RELEASE ORAL at 05:15

## 2019-11-07 NOTE — H&P
"      [facility] Medicine Services  DISCHARGE SUMMARY        Prepared For PCP:  Provider, No Known        Date of Admission:   11/3/2019    Date of Discharge:  11/7/2019    Length of stay:  LOS: 4 days     Reason For Admission:      Vomiting       Principal and Active Diagnosis During Hospital Stay:    Acute rhabdomyolysis     Alcohol abuse with acute intoxication     GERD     Hyperlipidemia    Acne       Hospital Course:      Mr. Hurtado is a 59 y.o. with a past medical history of alcohol abuse who presents to [facility] 11/3 complaining of vomiting starting today.  He admits to drinking a large bottle of whiskey and big reds over the last two days, but is unsure how much he drank.  He states he woke up this morning and began vomiting and noticing tremors.  He denies hematemesis, abdominal pain, chest pain, diarrhea.  He states his last drink was 12-24 hours ago.  The patient states he has been to rehab in the past, with his last visit approximately 2 years ago.  He is not interested in going back right now.  He states he doesn't drink as much as he used to, but \"it just gave me something to do over the last two days.\"  The patient lives alone and states he works out a lot to keep him from drinking a lot.       In the ED, the patient's labs included the following: Na 137, K 3.0, BUN 7, Cr 1.12, glucose 113, WBC 8.8, hgb 18.2, plts 172, , ALT 69, alk phos 79.  CK 5059.  ETOH 0.103.  The patient was given Zofran, banana bag, 1L NS, ativan and potassium in the ED.  He was admitted for further evaluation and management.     Patient was diagnosed with acute rhabdo as well as alcohol abuse with alcohol intoxication.  Patient received IV fluids and was placed on alcohol withdrawal protocol.  Patient's CK has improved.  Patient is stable for discharge home. Patient encouraged to decrease alcohol intake. Patient to follow-up with PCP.     Recommendation for Outpatient Providers:             "             Reasons For Change In Medications and Indications for New Medications:          Discharge Disposition      Home or Self Care       Discharge Medications      Continue These Medications      Instructions Start Date   sulfamethoxazole-trimethoprim 800-160 MG per tablet  Commonly known as:  BACTRIM DS,SEPTRA DS   1 tablet, Oral, Daily                   Test Results Pending at Discharge  .          Procedures Performed  None        Vital Signs    Temp:  [97.7 °F (36.5 °C)-98.1 °F (36.7 °C)] 97.8 °F (36.6 °C)  Heart Rate:  [78-85] 82  Resp:  [16-18] 17  BP: (135-148)/(80-91) 135/81    Physical Exam:    Physical Exam  Per physician     Consults:   Consults     Date and Time Order Name Status Description    11/3/2019 6289 Hospitalist (on-call MD unless specified) Completed           Pertinent Test Results:     Results from last 7 days   Lab Units 11/07/19 0418 11/06/19 0431 11/05/19 0419   WBC 10*3/mm3 8.50 8.50 8.40   HEMOGLOBIN g/dL 17.6 16.6 16.2   HEMATOCRIT % 50.2 47.6 46.6   PLATELETS 10*3/mm3 116* 99* 93*     Results from last 7 days   Lab Units 11/07/19 0048 11/06/19 0431 11/05/19 0419   SODIUM mmol/L 138 136 138   POTASSIUM mmol/L 3.6 3.8 3.6   CHLORIDE mmol/L 100 100 102   CO2 mmol/L 25.0 25.0 27.0   BUN mg/dL 8 7 8   CREATININE mg/dL 1.00 0.92 1.00   GLUCOSE mg/dL 72 80 81   CALCIUM mg/dL 8.5* 8.3* 7.2*     Results from last 7 days   Lab Units 11/07/19 0048 11/06/19 0431 11/05/19 0419 11/03/19 2009   SODIUM mmol/L 138 136 138   < > 137   POTASSIUM mmol/L 3.6 3.8 3.6   < > 3.0*   CHLORIDE mmol/L 100 100 102   < > 96*   CO2 mmol/L 25.0 25.0 27.0   < > 22.0   BUN mg/dL 8 7 8   < > 7   CREATININE mg/dL 1.00 0.92 1.00   < > 1.12   CALCIUM mg/dL 8.5* 8.3* 7.2*   < > 8.1*   BILIRUBIN mg/dL  --   --   --   --  3.1*   ALK PHOS U/L  --   --   --   --  79   ALT (SGPT) U/L  --   --   --   --  69*   AST (SGOT) U/L  --   --   --   --  111*   GLUCOSE mg/dL 72 80 81   < > 113*    < > = values in this  interval not displayed.         Lab Results   Component Value Date    CALCIUM 8.5 (L) 11/07/2019     No results found for: HGBA1C          Microbiology Results (last 10 days)     ** No results found for the last 240 hours. **          ECG/EMG Results (most recent)     Procedure Component Value Units Date/Time    ECG 12 Lead [[id number]] Collected:  11/04/19 0756     Updated:  11/06/19 2142    Narrative:       HEART RATE= 90  bpm  RR Interval= 668  ms  IN Interval= 170  ms  P Horizontal Axis= 31  deg  P Front Axis= 34  deg  QRSD Interval= 89  ms  QT Interval= 383  ms  QRS Axis= 2  deg  T Wave Axis= 15  deg  - ABNORMAL ECG -  Sinus rhythm  No previous ECG available for comparison  Electronically Signed By: Rafa Martins (MJ) 06-Nov-2019 21:42:41  Date and Time of Study: 2019-11-04 07:56:24                    No radiology results for the last 7 days        Condition on Discharge:      Stable    Discharge Diet:   Diet Instructions     Diet: Regular      Discharge Diet:  Regular          Activity at Discharge:   Activity Instructions     Activity as Tolerated            Follow-up Appointments  No future appointments.        PATRICK Martin  11/07/19  12:52 PM    Time: I spent  30  minutes on this discharge activity which included face-to-face encounter with the patient/reviewing the data in the system/coordination of the care with the nursing staff as well as consultants/documentation/entering orders.

## 2019-11-07 NOTE — DISCHARGE SUMMARY
"      [facility] Medicine Services  DISCHARGE SUMMARY        Prepared For PCP:  Provider, No Known        Date of Admission:   11/3/2019    Date of Discharge:  11/7/2019    Length of stay:  LOS: 4 days     Reason For Admission:    Patient admitted for extensive vomiting and possible tremors related to large intake of alcohol.      Principal and Active Diagnosis During Hospital Stay:    Acute rhabdomyolysis     Alcohol abuse with acute intoxication     GERD     Hyperlipidemia    Acne    Active Hospital Problems    Diagnosis  POA   • Elevated cholesterol [E78.00]  Unknown   • GERD (gastroesophageal reflux disease) [K21.9]  Unknown      Resolved Hospital Problems    Diagnosis Date Resolved POA   • **Traumatic rhabdomyolysis (CMS/HCC) [T79.6XXA] 11/07/2019 Yes   • ETOH abuse [F10.10] 11/07/2019 Yes        Hospital Course:      Mr. Hurtado is a 59 y.o. with a past medical history of alcohol abuse who presents to [facility] 11/3 complaining of vomiting starting today.  He admits to drinking a large bottle of whiskey and big reds over the last two days, but is unsure how much he drank.  He states he woke up this morning and began vomiting and noticing tremors.  He denies hematemesis, abdominal pain, chest pain, diarrhea.  He states his last drink was 12-24 hours ago.  The patient states he has been to rehab in the past, with his last visit approximately 2 years ago.  He is not interested in going back right now.  He states he doesn't drink as much as he used to, but \"it just gave me something to do over the last two days.\"  The patient lives alone and states he works out a lot to keep him from drinking a lot.       In the ED, the patient's labs included the following: Na 137, K 3.0, BUN 7, Cr 1.12, glucose 113, WBC 8.8, hgb 18.2, plts 172, , ALT 69, alk phos 79.  CK 5059.  ETOH 0.103.  The patient was given Zofran, banana bag, 1L NS, ativan and potassium in the ED.  He was admitted for " further evaluation and management.     Patient was diagnosed with acute rhabdo as well as alcohol abuse with alcohol intoxication.  Patient received IV fluids and was placed on alcohol withdrawal protocol.  Patient's CK has improved.  It should be noted that the patient was recommended to seek treatment his alcohol abuse, and possibly attend AA, but he stated he was not interested in such at this time.  Patient is stable for discharge home. Patient encouraged to decrease alcohol intake. Patient to follow-up with PCP.     Recommendation for Outpatient Providers:             Reasons For Change In Medications and Indications for New Medications:          Discharge Disposition      Home or Self Care       Discharge Medications      Continue These Medications      Instructions Start Date   sulfamethoxazole-trimethoprim 800-160 MG per tablet  Commonly known as:  BACTRIM DS,SEPTRA DS   1 tablet, Oral, Daily                   Test Results Pending at Discharge  .          Procedures Performed  None        Vital Signs    Temp:  [97.7 °F (36.5 °C)-98.1 °F (36.7 °C)] 97.8 °F (36.6 °C)  Heart Rate:  [78-85] 82  Resp:  [16-18] 17  BP: (135-148)/(80-91) 135/81    Physical Exam:    General: well-developed and well-nourished, NAD  HEENT: NC/AT, EOMI, PERRLA  Heart: RRR. No murmur   Chest: CTAB, no w/r/r, normal respiratory effort  Abdominal: Soft. NT/ND. Bowel sounds present  Musculoskeletal: Normal ROM.  No edema. No calf tenderness.  Neurological: AAOx3, no focal deficits  Skin: Skin is warm and dry. No rash  Psychiatric: Normal mood and affect.  Consults:   Consults     Date and Time Order Name Status Description    11/3/2019 0288 Hospitalist (on-call MD unless specified) Completed           Pertinent Test Results:     Results from last 7 days   Lab Units 11/07/19  0418 11/06/19  0431 11/05/19  0419   WBC 10*3/mm3 8.50 8.50 8.40   HEMOGLOBIN g/dL 17.6 16.6 16.2   HEMATOCRIT % 50.2 47.6 46.6   PLATELETS 10*3/mm3 116* 99* 93*      Results from last 7 days   Lab Units 11/07/19 0048 11/06/19 0431 11/05/19 0419   SODIUM mmol/L 138 136 138   POTASSIUM mmol/L 3.6 3.8 3.6   CHLORIDE mmol/L 100 100 102   CO2 mmol/L 25.0 25.0 27.0   BUN mg/dL 8 7 8   CREATININE mg/dL 1.00 0.92 1.00   GLUCOSE mg/dL 72 80 81   CALCIUM mg/dL 8.5* 8.3* 7.2*     Results from last 7 days   Lab Units 11/07/19 0048 11/06/19 0431 11/05/19 0419 11/03/19 2009   SODIUM mmol/L 138 136 138   < > 137   POTASSIUM mmol/L 3.6 3.8 3.6   < > 3.0*   CHLORIDE mmol/L 100 100 102   < > 96*   CO2 mmol/L 25.0 25.0 27.0   < > 22.0   BUN mg/dL 8 7 8   < > 7   CREATININE mg/dL 1.00 0.92 1.00   < > 1.12   CALCIUM mg/dL 8.5* 8.3* 7.2*   < > 8.1*   BILIRUBIN mg/dL  --   --   --   --  3.1*   ALK PHOS U/L  --   --   --   --  79   ALT (SGPT) U/L  --   --   --   --  69*   AST (SGOT) U/L  --   --   --   --  111*   GLUCOSE mg/dL 72 80 81   < > 113*    < > = values in this interval not displayed.         Lab Results   Component Value Date    CALCIUM 8.5 (L) 11/07/2019     No results found for: HGBA1C          Microbiology Results (last 10 days)     ** No results found for the last 240 hours. **          ECG/EMG Results (most recent)     Procedure Component Value Units Date/Time    ECG 12 Lead [[id number]] Collected:  11/04/19 0756     Updated:  11/06/19 2142    Narrative:       HEART RATE= 90  bpm  RR Interval= 668  ms  NJ Interval= 170  ms  P Horizontal Axis= 31  deg  P Front Axis= 34  deg  QRSD Interval= 89  ms  QT Interval= 383  ms  QRS Axis= 2  deg  T Wave Axis= 15  deg  - ABNORMAL ECG -  Sinus rhythm  No previous ECG available for comparison  Electronically Signed By: Rafa Martins (MJ) 06-Nov-2019 21:42:41  Date and Time of Study: 2019-11-04 07:56:24                    No radiology results for the last 7 days        Condition on Discharge:      Stable    Discharge Diet:   Diet Instructions     Diet: Regular      Discharge Diet:  Regular          Activity at Discharge:   Activity  Instructions     Activity as Tolerated            Follow-up Appointments  No future appointments.        PATRICK Martin  11/07/19  12:52 PM      I have personally performed a face-to-face diagnostic evaluation on this patient.  I have reviewed the chart, including labs, imaging and other relevant records. I agree with the note as scribed by PATRICK Camacho.  Corrections and addendums have been made for accuracy.  I have documented additional history, hospital course, and physical exam above.    Time: I spent  35  minutes on this discharge activity which included face-to-face encounter with the patient/reviewing the data in the system/coordination of the care with the nursing staff as well as consultants/documentation/entering orders.

## 2019-11-07 NOTE — PLAN OF CARE
Problem: Patient Care Overview  Goal: Plan of Care Review  Outcome: Ongoing (interventions implemented as appropriate)   11/07/19 4064   Coping/Psychosocial   Plan of Care Reviewed With patient   OTHER   Outcome Summary Patient is resting in bed comfortably. He states he feels better and has been drinking plenty of fluids. Will continue to monitor     Goal: Individualization and Mutuality  Outcome: Ongoing (interventions implemented as appropriate)    Goal: Interprofessional Rounds/Family Conf  Outcome: Ongoing (interventions implemented as appropriate)

## 2019-11-07 NOTE — PROGRESS NOTES
Discharge Planning Assessment   [facility]     Patient Name: Kevin Hurtado  MRN: [id number]  Today's Date: 11/7/2019    Admit Date: 11/3/2019      PATIENT IS TO DISCHARGE HOME AND DENIES ANY NEEDS    Carol naegele rn  Case management  Office number [phone]  Cell phone [phone]

## 2019-11-07 NOTE — PROGRESS NOTES
"      [facility] Medicine Services Daily Progress Note          Hospitalist Team  LOS 3 days      Patient Care Team:  Provider, No Known as PCP - General      Chief Complaint / Subjective  CC:   Chief Complaint   Patient presents with   • Vomiting       S: Patient was not able to sleep much, still having some issues with some disequilibrium/dizziness and congestion.  He does not feel well enough to be discharged.    Brief Synopsis of Hospital Course/HPI  Mr. Hurtado is a 59 y.o. with a past medical history of alcohol abuse who presents to [facility] 11/3 complaining of vomiting starting today.  He admits to drinking a large bottle of whiskey and big reds over the last two days, but is unsure how much he drank.  He states he woke up this morning and began vomiting and noticing tremors.  He denies hematemesis, abdominal pain, chest pain, diarrhea.  He states his last drink was 12-24 hours ago.  The patient states he has been to rehab in the past, with his last visit approximately 2 years ago.  He is not interested in going back right now.  He states he doesn't drink as much as he used to, but \"it just gave me something to do over the last two days.\"  The patient lives alone and states he works out a lot to keep him from drinking a lot.      In the ED, the patient's labs included the following: Na 137, K 3.0, BUN 7, Cr 1.12, glucose 113, WBC 8.8, hgb 18.2, plts 172, , ALT 69, alk phos 79.  CK 5059.  ETOH 0.103.  The patient was given Zofran, banana bag, 1L NS, ativan and potassium in the ED.  He was admitted for further evaluation and management.      ROS:  Review of Systems   Constitutional: Negative for chills and fever.   Gastrointestinal: Negative for nausea and vomiting.   Genitourinary: Negative for difficulty urinating, dysuria and hematuria.   Neurological: Positive for dizziness and headaches.   Psychiatric/Behavioral: Negative for agitation and confusion.   All other systems " "reviewed and are negative.      History reviewed. No pertinent family history.    Past Medical History:   Diagnosis Date   • Elevated cholesterol    • GERD (gastroesophageal reflux disease)        Social History     Socioeconomic History   • Marital status: Single     Spouse name: Not on file   • Number of children: Not on file   • Years of education: Not on file   • Highest education level: Not on file   Tobacco Use   • Smoking status: Never Smoker   • Smokeless tobacco: Current User     Types: Snuff   Substance and Sexual Activity   • Alcohol use: Yes     Comment: two bottles of liquor once a month   • Drug use: No   • Sexual activity: Yes     Partners: Female       Objective    Vital Signs:  Temp:  [97.7 °F (36.5 °C)-98.1 °F (36.7 °C)] 98.1 °F (36.7 °C)  Heart Rate:  [82-85] 85  Resp:  [16-18] 18  BP: (139-150)/() 148/91     Oxygen Therapy  SpO2: 98 %  Pulse Oximetry Type: Intermittent  Device (Oxygen Therapy): nasal cannula  Flow (L/min): 2     Flowsheet Rows      First Filed Value   Admission Height  175.3 cm (69\") Documented at 11/03/2019 1953   Admission Weight  102 kg (225 lb) Documented at 11/03/2019 1953        Intake & Output (last 3 days)       11/04 0701 - 11/05 0700 11/05 0701 - 11/06 0700 11/06 0701 - 11/07 0700    P.O. 480  1200    I.V. (mL/kg) 3330.3 (31.1) 1759 (17.4)     Total Intake(mL/kg) 3810.3 (35.6) 1759 (17.4) 1200 (11.9)    Urine (mL/kg/hr) 950 (0.4) 1575 (0.6) 400 (0.3)    Total Output 950 1575 400    Net +2860.3 +184 +800           Stool Unmeasured Occurrence 1 x          Lines, Drains & Airways    Active LDAs     Name:   Placement date:   Placement time:   Site:   Days:    Peripheral IV 11/04/19 0210 Anterior;Right Forearm   11/04/19 0210    Forearm   less than 1                  Physical Exam:  General: well-developed and well-nourished, NAD  HEENT: NC/AT, EOMI, PERRLA, diffuse sinus congestion without tenderness on palpation  Heart: RRR. No murmur   Chest: CTAB, no w/r/r, " normal respiratory effort  Abdominal: Soft. NT/ND. Bowel sounds present  Musculoskeletal: Normal ROM.  No edema. No calf tenderness.  Neurological: AAOx3, no focal deficits  Skin: Skin is warm and dry. No rash  Psychiatric: Normal mood and affect.    Procedures:       Results Review:     I reviewed the patient's new clinical results.    Results from last 7 days   Lab Units 11/06/19 0431 11/05/19 0419 11/04/19 0250 11/03/19 2009   WBC 10*3/mm3 8.50 8.40 9.40 8.80   HEMOGLOBIN g/dL 16.6 16.2 16.6 18.2*   HEMATOCRIT % 47.6 46.6 48.4 50.7   PLATELETS 10*3/mm3 99* 93* 142 172     Results from last 7 days   Lab Units 11/06/19 0431 11/05/19 0419 11/04/19 0250 11/03/19 2009   SODIUM mmol/L 136 138 138 137   POTASSIUM mmol/L 3.8 3.6 3.7 3.0*   CHLORIDE mmol/L 100 102 99 96*   CO2 mmol/L 25.0 27.0 25.0 22.0   BUN mg/dL 7 8 8 7   CREATININE mg/dL 0.92 1.00 1.02 1.12   GLUCOSE mg/dL 80 81 90 113*   ALBUMIN g/dL  --   --   --  3.80   BILIRUBIN mg/dL  --   --   --  3.1*   ALK PHOS U/L  --   --   --  79   AST (SGOT) U/L  --   --   --  111*   ALT (SGPT) U/L  --   --   --  69*   CALCIUM mg/dL 8.3* 7.2* 7.6* 8.1*     Cr Clearance Estimated Creatinine Clearance: 102.5 mL/min (by C-G formula based on SCr of 0.92 mg/dL).    Coag       HbA1C No results found for: HGBA1C  Blood Glucose       UA    Results from last 7 days   Lab Units 11/04/19  1725   NITRITE UA  Negative       Microbiology       ABG        EKG  ECG 12 Lead   Final Result   HEART RATE= 90  bpm   RR Interval= 668  ms   NV Interval= 170  ms   P Horizontal Axis= 31  deg   P Front Axis= 34  deg   QRSD Interval= 89  ms   QT Interval= 383  ms   QRS Axis= 2  deg   T Wave Axis= 15  deg   - ABNORMAL ECG -   Sinus rhythm   No previous ECG available for comparison   Electronically Signed By: Rafa Martins) 06-Nov-2019 21:42:41   Date and Time of Study: 2019-11-04 07:56:24          Imaging:  No radiology results for the last 7 days            Medication Review:   I  have reviewed the patient's current medication list      Scheduled Meds    cetirizine 10 mg Oral Daily   enoxaparin 40 mg Subcutaneous Q24H   pantoprazole 40 mg Oral Q AM   sodium chloride 10 mL Intravenous Q12H   sulfamethoxazole-trimethoprim 1 tablet Oral Daily       Meds Infusions       Meds PRN  •  acetaminophen **OR** acetaminophen **OR** acetaminophen  •  aluminum-magnesium hydroxide-simethicone  •  bisacodyl  •  calcium carbonate  •  docusate sodium  •  LORazepam **OR** LORazepam **OR** LORazepam **OR** LORazepam **OR** LORazepam **OR** LORazepam  •  magnesium hydroxide  •  magnesium sulfate **OR** magnesium sulfate in D5W 1g/100mL (PREMIX)  •  melatonin  •  ondansetron **OR** ondansetron  •  phenylephrine  •  potassium chloride  •  potassium chloride  •  promethazine  •  [COMPLETED] Insert peripheral IV **AND** sodium chloride  •  sodium chloride  •  sodium chloride      Assessment / Plan    Acute rhabdomyolysis  - CK 5059 --> 4305 --> 2519 --> 2360  - Renal function normal  - Patient requesting discontinuation of IV fluids, encoaged him to continue p.o. intake of water and noncaffeinated drinks.  - Check CK in a.m.    Alcohol abuse with acute intoxication  - Serum alcohol 0.103, now < 0.010  - Continue CIWA protocol; monitor closely for possible DVT for the next 48 to 72 hours  - Has not had Ativan since yesterday morning, no evidence of DTs  - Cessation encouraged, patient states he has a plan to quit  - UDS negative    GERD  - No home medications listed  - Start Protonix    Hyperlipidemia  - No home medications listed    Acne  - Currently on Bactrim as an outpatient; continue same    VTE prophylaxis  - Lovenox 40 mg SC daily    Active Hospital Problems    Diagnosis  POA   • **Traumatic rhabdomyolysis (CMS/HCC) [T79.6XXA]  Yes   • Elevated cholesterol [E78.00]  Unknown   • GERD (gastroesophageal reflux disease) [K21.9]  Unknown   • ETOH abuse [F10.10]  Yes      Resolved Hospital Problems   No resolved  problems to display.     Hospital problem list:    Traumatic rhabdomyolysis (CMS/HCC)    ETOH abuse    Elevated cholesterol    GERD (gastroesophageal reflux disease)    Discharge Planning    Plan to discharge home tomorrow      Destination      No service coordination in this encounter.      Durable Medical Equipment      No service coordination in this encounter.      Dialysis/Infusion      No service coordination in this encounter.      Home Medical Care      No service coordination in this encounter.      Therapy      No service coordination in this encounter.      Community Resources      No service coordination in this encounter.            Susi Ortiz MD  11/06/19  10:36 PM

## 2019-11-08 ENCOUNTER — READMISSION MANAGEMENT (OUTPATIENT)
Dept: CALL CENTER | Facility: HOSPITAL | Age: 59
End: 2019-11-08

## 2019-11-08 NOTE — PROGRESS NOTES
Discharge Planning Assessment   [facility]     Patient Name: Kevin Hurtado  MRN: [id number]  Today's Date: 11/8/2019    Admit Date: 11/3/2019          Plan    Final Discharge Disposition Code  01 - home or self-care    Final Note  return home       Carol naegele rn  Case management  Office number [phone]  Cell phone [phone]

## 2019-11-08 NOTE — PAYOR COMM NOTE
"Discharge notice        RETURN CONTACT  MARCELLA GRANADOS RN   [facility]   [facility] /    PH: [phone]  FX: [phone]    Kevin Hurtado (59 y.o. Male)     Date of Birth Social Security Number Address Home Phone MRN    1960  [id number] [facility] [place] [address] [phone] [id number]    [employer]abapt[employer] Marital Status          Catholic Single       Admission Date Admission Type Admitting Provider Attending Provider Department, Room/Bed    11/3/19 Emergency Susi Ortiz MD  [facility] 3C MEDICAL INPATIENT, 355/1    Discharge Date Discharge Disposition Discharge Destination        11/7/2019 Home or Self Care              Attending Provider:  (none)   Allergies:  No Known Allergies    Isolation:  None   Infection:  None   Code Status:  Prior    Ht:  175.3 cm (69\")   Wt:  101 kg (222 lb 3.6 oz)    Admission Cmt:  None   Principal Problem:  Traumatic rhabdomyolysis (CMS/[facility]) [T79.6XXA]                 Active Insurance as of 11/3/2019     Primary Coverage     Payor Plan Insurance Group Employer/Plan Group    ANTH MEDICAID [facility] [id number]     Payor Plan Address Payor Plan Phone Number Payor Plan Fax Number Effective Dates    MAIL STOP:   12/1/2018 - None Entered    [address] [place] [address]       Subscriber Name Subscriber Birth Date Member ID       KEVIN HURTADO 1960 [id number]                 Emergency Contacts      (Rel.) Home Phone Work Phone Mobile Phone    BLADE HURTADO (Mother) [phone] -- [phone]               Discharge Summary      Didi Howell, APRN at 11/07/19 1504                [facility] Medicine Services  DISCHARGE SUMMARY        Prepared For PCP:  Provider, No Known        Date of Admission:   11/3/2019    Date of Discharge:  11/7/2019    Length of stay:  LOS: 4 days     Reason For Admission:      Vomiting       Principal and Active Diagnosis During Hospital " "Stay:    Acute rhabdomyolysis     Alcohol abuse with acute intoxication     GERD     Hyperlipidemia    Acne       Hospital Course:      Mr. Hurtado is a 59 y.o. with a past medical history of alcohol abuse who presents to [facility] 11/3 complaining of vomiting starting today.  He admits to drinking a large bottle of whiskey and big reds over the last two days, but is unsure how much he drank.  He states he woke up this morning and began vomiting and noticing tremors.  He denies hematemesis, abdominal pain, chest pain, diarrhea.  He states his last drink was 12-24 hours ago.  The patient states he has been to rehab in the past, with his last visit approximately 2 years ago.  He is not interested in going back right now.  He states he doesn't drink as much as he used to, but \"it just gave me something to do over the last two days.\"  The patient lives alone and states he works out a lot to keep him from drinking a lot.       In the ED, the patient's labs included the following: Na 137, K 3.0, BUN 7, Cr 1.12, glucose 113, WBC 8.8, hgb 18.2, plts 172, , ALT 69, alk phos 79.  CK 5059.  ETOH 0.103.  The patient was given Zofran, banana bag, 1L NS, ativan and potassium in the ED.  He was admitted for further evaluation and management.     Patient was diagnosed with acute rhabdo as well as alcohol abuse with alcohol intoxication.  Patient received IV fluids and was placed on alcohol withdrawal protocol.  Patient's CK has improved.  Patient is stable for discharge home. Patient encouraged to decrease alcohol intake. Patient to follow-up with PCP.     Recommendation for Outpatient Providers:             Reasons For Change In Medications and Indications for New Medications:          Discharge Disposition      Home or Self Care       Discharge Medications      Continue These Medications      Instructions Start Date   sulfamethoxazole-trimethoprim 800-160 MG per tablet  Commonly known as:  BACTRIM DS,SEPTRA DS   1 " tablet, Oral, Daily                   Test Results Pending at Discharge  .          Procedures Performed  None        Vital Signs    Temp:  [97.7 °F (36.5 °C)-98.1 °F (36.7 °C)] 97.8 °F (36.6 °C)  Heart Rate:  [78-85] 82  Resp:  [16-18] 17  BP: (135-148)/(80-91) 135/81    Physical Exam:    Physical Exam  Per physician     Consults:   Consults     Date and Time Order Name Status Description    11/3/2019 8099 Hospitalist (on-call MD unless specified) Completed           Pertinent Test Results:     Results from last 7 days   Lab Units 11/07/19 0418 11/06/19 0431 11/05/19 0419   WBC 10*3/mm3 8.50 8.50 8.40   HEMOGLOBIN g/dL 17.6 16.6 16.2   HEMATOCRIT % 50.2 47.6 46.6   PLATELETS 10*3/mm3 116* 99* 93*     Results from last 7 days   Lab Units 11/07/19 0048 11/06/19 0431 11/05/19 0419   SODIUM mmol/L 138 136 138   POTASSIUM mmol/L 3.6 3.8 3.6   CHLORIDE mmol/L 100 100 102   CO2 mmol/L 25.0 25.0 27.0   BUN mg/dL 8 7 8   CREATININE mg/dL 1.00 0.92 1.00   GLUCOSE mg/dL 72 80 81   CALCIUM mg/dL 8.5* 8.3* 7.2*     Results from last 7 days   Lab Units 11/07/19 0048 11/06/19 0431 11/05/19 0419 11/03/19 2009   SODIUM mmol/L 138 136 138   < > 137   POTASSIUM mmol/L 3.6 3.8 3.6   < > 3.0*   CHLORIDE mmol/L 100 100 102   < > 96*   CO2 mmol/L 25.0 25.0 27.0   < > 22.0   BUN mg/dL 8 7 8   < > 7   CREATININE mg/dL 1.00 0.92 1.00   < > 1.12   CALCIUM mg/dL 8.5* 8.3* 7.2*   < > 8.1*   BILIRUBIN mg/dL  --   --   --   --  3.1*   ALK PHOS U/L  --   --   --   --  79   ALT (SGPT) U/L  --   --   --   --  69*   AST (SGOT) U/L  --   --   --   --  111*   GLUCOSE mg/dL 72 80 81   < > 113*    < > = values in this interval not displayed.         Lab Results   Component Value Date    CALCIUM 8.5 (L) 11/07/2019     No results found for: HGBA1C          Microbiology Results (last 10 days)     ** No results found for the last 240 hours. **          ECG/EMG Results (most recent)     Procedure Component Value Units Date/Time    ECG 12 Lead  [[id number]] Collected:  11/04/19 0756     Updated:  11/06/19 2142    Narrative:       HEART RATE= 90  bpm  RR Interval= 668  ms  SC Interval= 170  ms  P Horizontal Axis= 31  deg  P Front Axis= 34  deg  QRSD Interval= 89  ms  QT Interval= 383  ms  QRS Axis= 2  deg  T Wave Axis= 15  deg  - ABNORMAL ECG -  Sinus rhythm  No previous ECG available for comparison  Electronically Signed By: Rafa Martins) 06-Nov-2019 21:42:41  Date and Time of Study: 2019-11-04 07:56:24                    No radiology results for the last 7 days        Condition on Discharge:      Stable    Discharge Diet:   Diet Instructions     Diet: Regular      Discharge Diet:  Regular          Activity at Discharge:   Activity Instructions     Activity as Tolerated            Follow-up Appointments  No future appointments.        PATRICK Martin  11/07/19  12:52 PM    Time: I spent  30  minutes on this discharge activity which included face-to-face encounter with the patient/reviewing the data in the system/coordination of the care with the nursing staff as well as consultants/documentation/entering orders.      Electronically signed by Didi Howell APRN at 11/07/19 1644

## 2019-11-09 NOTE — OUTREACH NOTE
Prep Survey      Responses   Facility patient discharged from?  [facility]   Is patient eligible?  Yes   Discharge diagnosis  Acute rhabdomyolysis,  Alcohol abuse with acute intoxication,  Vomiting,  tremor   Does the patient have one of the following disease processes/diagnoses(primary or secondary)?  Other   Does the patient have Home health ordered?  No   Is there a DME ordered?  No   Prep survey completed?  Yes          Blank Keys RN

## 2019-11-11 ENCOUNTER — READMISSION MANAGEMENT (OUTPATIENT)
Dept: CALL CENTER | Facility: HOSPITAL | Age: 59
End: 2019-11-11

## 2019-11-11 NOTE — OUTREACH NOTE
Medical Week 1 Survey      Responses   Facility patient discharged from?  [facility]   Does the patient have one of the following disease processes/diagnoses(primary or secondary)?  Other   Is there a successful TCM telephone encounter documented?  No   Week 1 attempt successful?  No   Revoke  Decline to participate [No answer/No voicemail]          Silvia Walker LPN

## 2024-08-26 ENCOUNTER — HOSPITAL ENCOUNTER (INPATIENT)
Facility: HOSPITAL | Age: 64
LOS: 2 days | Discharge: NURSING, REHAB OR LONG-TERM CARE | DRG: 178 | End: 2024-08-28
Attending: INTERNAL MEDICINE | Admitting: INTERNAL MEDICINE
Payer: MEDICAID

## 2024-08-26 ENCOUNTER — APPOINTMENT (OUTPATIENT)
Dept: CT IMAGING | Facility: HOSPITAL | Age: 64
DRG: 178 | End: 2024-08-26
Payer: MEDICAID

## 2024-08-26 ENCOUNTER — APPOINTMENT (OUTPATIENT)
Dept: GENERAL RADIOLOGY | Facility: HOSPITAL | Age: 64
DRG: 178 | End: 2024-08-26
Payer: MEDICAID

## 2024-08-26 DIAGNOSIS — R11.10 VOMITING, UNSPECIFIED VOMITING TYPE, UNSPECIFIED WHETHER NAUSEA PRESENT: ICD-10-CM

## 2024-08-26 DIAGNOSIS — J18.9 PNEUMONIA OF RIGHT LOWER LOBE DUE TO INFECTIOUS ORGANISM: Primary | ICD-10-CM

## 2024-08-26 DIAGNOSIS — N20.1 URETEROLITHIASIS: ICD-10-CM

## 2024-08-26 DIAGNOSIS — R11.2 NAUSEA AND VOMITING, UNSPECIFIED VOMITING TYPE: ICD-10-CM

## 2024-08-26 DIAGNOSIS — K56.7 ILEUS: ICD-10-CM

## 2024-08-26 LAB
ALBUMIN SERPL-MCNC: 3.8 G/DL (ref 3.5–5.2)
ALBUMIN/GLOB SERPL: 1.2 G/DL
ALP SERPL-CCNC: 116 U/L (ref 39–117)
ALT SERPL W P-5'-P-CCNC: 25 U/L (ref 1–41)
ANION GAP SERPL CALCULATED.3IONS-SCNC: 10.8 MMOL/L (ref 5–15)
AST SERPL-CCNC: 43 U/L (ref 1–40)
B PARAPERT DNA SPEC QL NAA+PROBE: NOT DETECTED
B PERT DNA SPEC QL NAA+PROBE: NOT DETECTED
BACTERIA UR QL AUTO: ABNORMAL /HPF
BASOPHILS # BLD AUTO: 0.02 10*3/MM3 (ref 0–0.2)
BASOPHILS NFR BLD AUTO: 0.2 % (ref 0–1.5)
BILIRUB SERPL-MCNC: 1.4 MG/DL (ref 0–1.2)
BILIRUB UR QL STRIP: NEGATIVE
BUN SERPL-MCNC: 14 MG/DL (ref 8–23)
BUN/CREAT SERPL: 13.7 (ref 7–25)
C PNEUM DNA NPH QL NAA+NON-PROBE: NOT DETECTED
CALCIUM SPEC-SCNC: 8.8 MG/DL (ref 8.6–10.5)
CHLORIDE SERPL-SCNC: 104 MMOL/L (ref 98–107)
CLARITY UR: ABNORMAL
CO2 SERPL-SCNC: 30.2 MMOL/L (ref 22–29)
COD CRY URNS QL: ABNORMAL /HPF
COLOR UR: YELLOW
CREAT SERPL-MCNC: 1.02 MG/DL (ref 0.76–1.27)
D-LACTATE SERPL-SCNC: 1.9 MMOL/L (ref 0.3–2)
DEPRECATED RDW RBC AUTO: 50.4 FL (ref 37–54)
EGFRCR SERPLBLD CKD-EPI 2021: 82.6 ML/MIN/1.73
EOSINOPHIL # BLD AUTO: 0.08 10*3/MM3 (ref 0–0.4)
EOSINOPHIL NFR BLD AUTO: 0.9 % (ref 0.3–6.2)
ERYTHROCYTE [DISTWIDTH] IN BLOOD BY AUTOMATED COUNT: 15 % (ref 12.3–15.4)
FLUAV SUBTYP SPEC NAA+PROBE: NOT DETECTED
FLUBV RNA ISLT QL NAA+PROBE: NOT DETECTED
GLOBULIN UR ELPH-MCNC: 3.1 GM/DL
GLUCOSE SERPL-MCNC: 109 MG/DL (ref 65–99)
GLUCOSE UR STRIP-MCNC: NEGATIVE MG/DL
HADV DNA SPEC NAA+PROBE: NOT DETECTED
HCOV 229E RNA SPEC QL NAA+PROBE: NOT DETECTED
HCOV HKU1 RNA SPEC QL NAA+PROBE: NOT DETECTED
HCOV NL63 RNA SPEC QL NAA+PROBE: NOT DETECTED
HCOV OC43 RNA SPEC QL NAA+PROBE: NOT DETECTED
HCT VFR BLD AUTO: 43.3 % (ref 37.5–51)
HGB BLD-MCNC: 13.6 G/DL (ref 13–17.7)
HGB UR QL STRIP.AUTO: ABNORMAL
HMPV RNA NPH QL NAA+NON-PROBE: NOT DETECTED
HOLD SPECIMEN: NORMAL
HPIV1 RNA ISLT QL NAA+PROBE: NOT DETECTED
HPIV2 RNA SPEC QL NAA+PROBE: NOT DETECTED
HPIV3 RNA NPH QL NAA+PROBE: NOT DETECTED
HPIV4 P GENE NPH QL NAA+PROBE: NOT DETECTED
HYALINE CASTS UR QL AUTO: ABNORMAL /LPF
IMM GRANULOCYTES # BLD AUTO: 0.04 10*3/MM3 (ref 0–0.05)
IMM GRANULOCYTES NFR BLD AUTO: 0.4 % (ref 0–0.5)
KETONES UR QL STRIP: ABNORMAL
LEUKOCYTE ESTERASE UR QL STRIP.AUTO: ABNORMAL
LIPASE SERPL-CCNC: 13 U/L (ref 13–60)
LYMPHOCYTES # BLD AUTO: 0.54 10*3/MM3 (ref 0.7–3.1)
LYMPHOCYTES NFR BLD AUTO: 5.8 % (ref 19.6–45.3)
M PNEUMO IGG SER IA-ACNC: NOT DETECTED
MAGNESIUM SERPL-MCNC: 1.6 MG/DL (ref 1.6–2.4)
MCH RBC QN AUTO: 29.1 PG (ref 26.6–33)
MCHC RBC AUTO-ENTMCNC: 31.4 G/DL (ref 31.5–35.7)
MCV RBC AUTO: 92.5 FL (ref 79–97)
MONOCYTES # BLD AUTO: 0.29 10*3/MM3 (ref 0.1–0.9)
MONOCYTES NFR BLD AUTO: 3.1 % (ref 5–12)
NEUTROPHILS NFR BLD AUTO: 8.32 10*3/MM3 (ref 1.7–7)
NEUTROPHILS NFR BLD AUTO: 89.6 % (ref 42.7–76)
NITRITE UR QL STRIP: NEGATIVE
NRBC BLD AUTO-RTO: 0 /100 WBC (ref 0–0.2)
NT-PROBNP SERPL-MCNC: 243 PG/ML (ref 0–900)
PH UR STRIP.AUTO: <=5 [PH] (ref 5–8)
PLATELET # BLD AUTO: 179 10*3/MM3 (ref 140–450)
PMV BLD AUTO: 9.8 FL (ref 6–12)
POTASSIUM SERPL-SCNC: 3.6 MMOL/L (ref 3.5–5.2)
PROCALCITONIN SERPL-MCNC: 0.32 NG/ML (ref 0–0.25)
PROT SERPL-MCNC: 6.9 G/DL (ref 6–8.5)
PROT UR QL STRIP: ABNORMAL
RBC # BLD AUTO: 4.68 10*6/MM3 (ref 4.14–5.8)
RBC # UR STRIP: ABNORMAL /HPF
REF LAB TEST METHOD: ABNORMAL
RHINOVIRUS RNA SPEC NAA+PROBE: NOT DETECTED
RSV RNA NPH QL NAA+NON-PROBE: NOT DETECTED
SARS-COV-2 RNA NPH QL NAA+NON-PROBE: NOT DETECTED
SODIUM SERPL-SCNC: 145 MMOL/L (ref 136–145)
SP GR UR STRIP: 1.06 (ref 1–1.03)
SQUAMOUS #/AREA URNS HPF: ABNORMAL /HPF
TRANS CELLS #/AREA URNS HPF: ABNORMAL /HPF
UROBILINOGEN UR QL STRIP: ABNORMAL
WBC # UR STRIP: ABNORMAL /HPF
WBC NRBC COR # BLD AUTO: 9.29 10*3/MM3 (ref 3.4–10.8)
WHOLE BLOOD HOLD COAG: NORMAL

## 2024-08-26 PROCEDURE — 74177 CT ABD & PELVIS W/CONTRAST: CPT

## 2024-08-26 PROCEDURE — 99285 EMERGENCY DEPT VISIT HI MDM: CPT

## 2024-08-26 PROCEDURE — 83690 ASSAY OF LIPASE: CPT | Performed by: PHYSICIAN ASSISTANT

## 2024-08-26 PROCEDURE — 93005 ELECTROCARDIOGRAM TRACING: CPT | Performed by: EMERGENCY MEDICINE

## 2024-08-26 PROCEDURE — 71045 X-RAY EXAM CHEST 1 VIEW: CPT

## 2024-08-26 PROCEDURE — 25810000003 SODIUM CHLORIDE 0.9 % SOLUTION: Performed by: INTERNAL MEDICINE

## 2024-08-26 PROCEDURE — 87040 BLOOD CULTURE FOR BACTERIA: CPT | Performed by: PHYSICIAN ASSISTANT

## 2024-08-26 PROCEDURE — 36415 COLL VENOUS BLD VENIPUNCTURE: CPT

## 2024-08-26 PROCEDURE — 83605 ASSAY OF LACTIC ACID: CPT

## 2024-08-26 PROCEDURE — 80074 ACUTE HEPATITIS PANEL: CPT | Performed by: NURSE PRACTITIONER

## 2024-08-26 PROCEDURE — 81001 URINALYSIS AUTO W/SCOPE: CPT | Performed by: PHYSICIAN ASSISTANT

## 2024-08-26 PROCEDURE — 71250 CT THORAX DX C-: CPT

## 2024-08-26 PROCEDURE — 83880 ASSAY OF NATRIURETIC PEPTIDE: CPT | Performed by: PHYSICIAN ASSISTANT

## 2024-08-26 PROCEDURE — 80053 COMPREHEN METABOLIC PANEL: CPT | Performed by: PHYSICIAN ASSISTANT

## 2024-08-26 PROCEDURE — 85025 COMPLETE CBC W/AUTO DIFF WBC: CPT | Performed by: PHYSICIAN ASSISTANT

## 2024-08-26 PROCEDURE — 25010000002 CEFTRIAXONE PER 250 MG: Performed by: PHYSICIAN ASSISTANT

## 2024-08-26 PROCEDURE — 25010000002 ONDANSETRON PER 1 MG: Performed by: PHYSICIAN ASSISTANT

## 2024-08-26 PROCEDURE — 0202U NFCT DS 22 TRGT SARS-COV-2: CPT | Performed by: PHYSICIAN ASSISTANT

## 2024-08-26 PROCEDURE — 25010000002 AMPICILLIN-SULBACTAM PER 1.5 G: Performed by: INTERNAL MEDICINE

## 2024-08-26 PROCEDURE — 25510000001 IOPAMIDOL PER 1 ML: Performed by: PHYSICIAN ASSISTANT

## 2024-08-26 PROCEDURE — 84145 PROCALCITONIN (PCT): CPT | Performed by: INTERNAL MEDICINE

## 2024-08-26 PROCEDURE — P9612 CATHETERIZE FOR URINE SPEC: HCPCS

## 2024-08-26 PROCEDURE — 83735 ASSAY OF MAGNESIUM: CPT | Performed by: PHYSICIAN ASSISTANT

## 2024-08-26 RX ORDER — LANOLIN ALCOHOL/MO/W.PET/CERES
1 CREAM (GRAM) TOPICAL 2 TIMES DAILY
COMMUNITY

## 2024-08-26 RX ORDER — HYDROCODONE BITARTRATE AND ACETAMINOPHEN 10; 325 MG/1; MG/1
1 TABLET ORAL 3 TIMES DAILY
Status: ON HOLD | COMMUNITY
End: 2024-08-28

## 2024-08-26 RX ORDER — POLYETHYLENE GLYCOL 3350 17 G/17G
17 POWDER, FOR SOLUTION ORAL DAILY PRN
Status: CANCELLED | OUTPATIENT
Start: 2024-08-26

## 2024-08-26 RX ORDER — ACETAMINOPHEN 325 MG/1
650 TABLET ORAL EVERY 4 HOURS PRN
Status: CANCELLED | OUTPATIENT
Start: 2024-08-26

## 2024-08-26 RX ORDER — GABAPENTIN 400 MG/1
400 CAPSULE ORAL 3 TIMES DAILY
COMMUNITY

## 2024-08-26 RX ORDER — IOPAMIDOL 755 MG/ML
100 INJECTION, SOLUTION INTRAVASCULAR
Status: COMPLETED | OUTPATIENT
Start: 2024-08-26 | End: 2024-08-26

## 2024-08-26 RX ORDER — BISACODYL 10 MG
10 SUPPOSITORY, RECTAL RECTAL DAILY PRN
Status: CANCELLED | OUTPATIENT
Start: 2024-08-26

## 2024-08-26 RX ORDER — MAGNESIUM HYDROXIDE/ALUMINUM HYDROXICE/SIMETHICONE 120; 1200; 1200 MG/30ML; MG/30ML; MG/30ML
30 SUSPENSION ORAL EVERY 8 HOURS PRN
COMMUNITY

## 2024-08-26 RX ORDER — SODIUM CHLORIDE 0.9 % (FLUSH) 0.9 %
10 SYRINGE (ML) INJECTION AS NEEDED
Status: DISCONTINUED | OUTPATIENT
Start: 2024-08-26 | End: 2024-08-28 | Stop reason: HOSPADM

## 2024-08-26 RX ORDER — KETOCONAZOLE 20 MG/ML
1 SHAMPOO TOPICAL WEEKLY
COMMUNITY

## 2024-08-26 RX ORDER — SODIUM CHLORIDE 0.9 % (FLUSH) 0.9 %
10 SYRINGE (ML) INJECTION EVERY 12 HOURS SCHEDULED
Status: CANCELLED | OUTPATIENT
Start: 2024-08-26

## 2024-08-26 RX ORDER — MECLIZINE HYDROCHLORIDE 25 MG/1
25 TABLET ORAL EVERY 8 HOURS PRN
COMMUNITY

## 2024-08-26 RX ORDER — ATORVASTATIN CALCIUM 40 MG/1
40 TABLET, FILM COATED ORAL NIGHTLY
COMMUNITY

## 2024-08-26 RX ORDER — ACETAMINOPHEN 160 MG/5ML
650 SOLUTION ORAL EVERY 4 HOURS PRN
Status: CANCELLED | OUTPATIENT
Start: 2024-08-26

## 2024-08-26 RX ORDER — ASPIRIN 81 MG/1
81 TABLET, CHEWABLE ORAL EVERY MORNING
COMMUNITY

## 2024-08-26 RX ORDER — BACLOFEN 10 MG/1
10 TABLET ORAL 3 TIMES DAILY
COMMUNITY

## 2024-08-26 RX ORDER — SODIUM CHLORIDE 9 MG/ML
50 INJECTION, SOLUTION INTRAVENOUS CONTINUOUS
Status: DISCONTINUED | OUTPATIENT
Start: 2024-08-26 | End: 2024-08-28 | Stop reason: HOSPADM

## 2024-08-26 RX ORDER — ONDANSETRON 2 MG/ML
4 INJECTION INTRAMUSCULAR; INTRAVENOUS ONCE
Status: COMPLETED | OUTPATIENT
Start: 2024-08-26 | End: 2024-08-26

## 2024-08-26 RX ORDER — CALCIUM CARBONATE 500 MG/1
1 TABLET, CHEWABLE ORAL 3 TIMES DAILY
COMMUNITY

## 2024-08-26 RX ORDER — PETROLATUM,WHITE
1 OINTMENT IN PACKET (GRAM) TOPICAL
COMMUNITY

## 2024-08-26 RX ORDER — PANTOPRAZOLE SODIUM 40 MG/10ML
40 INJECTION, POWDER, LYOPHILIZED, FOR SOLUTION INTRAVENOUS
Status: DISCONTINUED | OUTPATIENT
Start: 2024-08-26 | End: 2024-08-28 | Stop reason: HOSPADM

## 2024-08-26 RX ORDER — CARBOXYMETHYLCELLULOSE SODIUM 5 MG/ML
2 SOLUTION/ DROPS OPHTHALMIC 2 TIMES DAILY
COMMUNITY

## 2024-08-26 RX ORDER — ACETAMINOPHEN 650 MG/1
650 SUPPOSITORY RECTAL EVERY 4 HOURS PRN
Status: CANCELLED | OUTPATIENT
Start: 2024-08-26

## 2024-08-26 RX ORDER — NALOXONE HCL 0.4 MG/ML
0.4 VIAL (ML) INJECTION
Status: CANCELLED | OUTPATIENT
Start: 2024-08-26

## 2024-08-26 RX ORDER — FAMOTIDINE 20 MG/1
20 TABLET, FILM COATED ORAL 2 TIMES DAILY
COMMUNITY

## 2024-08-26 RX ORDER — SODIUM CHLORIDE 9 MG/ML
40 INJECTION, SOLUTION INTRAVENOUS AS NEEDED
Status: CANCELLED | OUTPATIENT
Start: 2024-08-26

## 2024-08-26 RX ORDER — EZETIMIBE 10 MG/1
10 TABLET ORAL EVERY MORNING
COMMUNITY

## 2024-08-26 RX ORDER — TAMSULOSIN HYDROCHLORIDE 0.4 MG/1
1 CAPSULE ORAL EVERY MORNING
COMMUNITY

## 2024-08-26 RX ORDER — ONDANSETRON 4 MG/1
4 TABLET, FILM COATED ORAL EVERY 6 HOURS PRN
COMMUNITY

## 2024-08-26 RX ORDER — SCOLOPAMINE TRANSDERMAL SYSTEM 1 MG/1
1 PATCH, EXTENDED RELEASE TRANSDERMAL
COMMUNITY

## 2024-08-26 RX ORDER — AMOXICILLIN 250 MG
2 CAPSULE ORAL 2 TIMES DAILY PRN
Status: CANCELLED | OUTPATIENT
Start: 2024-08-26

## 2024-08-26 RX ORDER — SODIUM CHLORIDE 0.9 % (FLUSH) 0.9 %
10 SYRINGE (ML) INJECTION AS NEEDED
Status: CANCELLED | OUTPATIENT
Start: 2024-08-26

## 2024-08-26 RX ORDER — IBUPROFEN 600 MG/1
600 TABLET, FILM COATED ORAL EVERY 8 HOURS PRN
COMMUNITY
End: 2024-08-28 | Stop reason: HOSPADM

## 2024-08-26 RX ORDER — MORPHINE SULFATE 2 MG/ML
1 INJECTION, SOLUTION INTRAMUSCULAR; INTRAVENOUS EVERY 4 HOURS PRN
Status: CANCELLED | OUTPATIENT
Start: 2024-08-26 | End: 2024-08-31

## 2024-08-26 RX ORDER — BISACODYL 5 MG/1
5 TABLET, DELAYED RELEASE ORAL DAILY PRN
Status: CANCELLED | OUTPATIENT
Start: 2024-08-26

## 2024-08-26 RX ORDER — ONDANSETRON 2 MG/ML
4 INJECTION INTRAMUSCULAR; INTRAVENOUS EVERY 6 HOURS PRN
Status: CANCELLED | OUTPATIENT
Start: 2024-08-26

## 2024-08-26 RX ORDER — ONDANSETRON 4 MG/1
4 TABLET, ORALLY DISINTEGRATING ORAL EVERY 6 HOURS PRN
Status: CANCELLED | OUTPATIENT
Start: 2024-08-26

## 2024-08-26 RX ADMIN — IOPAMIDOL 100 ML: 755 INJECTION, SOLUTION INTRAVENOUS at 13:53

## 2024-08-26 RX ADMIN — SODIUM CHLORIDE 50 ML/HR: 9 INJECTION, SOLUTION INTRAVENOUS at 17:39

## 2024-08-26 RX ADMIN — PANTOPRAZOLE SODIUM 40 MG: 40 INJECTION, POWDER, FOR SOLUTION INTRAVENOUS at 15:51

## 2024-08-26 RX ADMIN — ONDANSETRON 4 MG: 2 INJECTION INTRAMUSCULAR; INTRAVENOUS at 13:04

## 2024-08-26 RX ADMIN — CEFTRIAXONE 2000 MG: 2 INJECTION, POWDER, FOR SOLUTION INTRAMUSCULAR; INTRAVENOUS at 15:52

## 2024-08-26 RX ADMIN — AMPICILLIN SODIUM AND SULBACTAM SODIUM 3 G: 2; 1 INJECTION, POWDER, FOR SOLUTION INTRAMUSCULAR; INTRAVENOUS at 17:39

## 2024-08-26 NOTE — LETTER
EMS Transport Request  For use at [facility], [place], [facility], [place], and [place] only   Patient Name: Kevin Hurtado : 1960   Weight:93.8 kg (206 lb 12.7 oz) Pick-up Location: [facility] BLS/ALS: BLS/ALS: BLS   Ins[place] [place] Auth End Date:    Pre-Cert #: D/C Summary complete:    Destination: Other [facility]   Contact Precautions: None   Equipment (O2, Fluids, etc.): None   Arrive By Date/Time: 24 1015 Stretcher/WC: Stretcher   CM Requesting: Chitra Herzog RN Ext: [phone]   Notes/Medical Necessity:  Baseline connie lift for transfers.   Max assist of 2 for bed mobility.   History CVA with left sided heiparesis.   L shoulder subluxation.   LLE fixed in hip ER/knee flexion, stage 1 pressure ulcer L heel, stage 2 pressure ulcer R heel.  DTI Left ankle.      ______________________________________________________________________    *Only 2 patient bags OR 1 carry-on size bag are permitted.  Wheelchairs and walkers CANNOT transported with the patient. Acknowledge: Yes

## 2024-08-26 NOTE — Clinical Note
Level of Care: Telemetry [5]   Diagnosis: Nausea & vomiting [861126]   Certification: I Certify That Inpatient Hospital Services Are Medically Necessary For Greater Than 2 Midnights

## 2024-08-26 NOTE — CASE MANAGEMENT/SOCIAL WORK
Discharge Planning Assessment   [facility]     Patient Name: Kevin Hurtado  MRN: [id number]  Today's Date: 8/26/2024    Admit Date: 8/26/2024    Plan: D/C Plan: [place] [facility] [place] Will need [place] assist.   Discharge Needs Assessment       Row Name 08/26/24 1757       Living Environment    People in Home facility resident    Name(s) of People in Home [place]    Current Living Arrangements extended care facility    Potentially Unsafe Housing Conditions none    In the past 12 months has the electric, gas, oil, or water company threatened to shut off services in your home? No    Primary Care Provided by other (see comments)  [place]    Provides Primary Care For no one, unable/limited ability to care for self    Family Caregiver if Needed none    Quality of Family Relationships supportive    Able to Return to Prior Arrangements yes    Living Arrangement Comments [place]       Resource/Environmental Concerns    Resource/Environmental Concerns none    Transportation Concerns none       Transportation Needs    In the past 12 months, has lack of transportation kept you from medical appointments or from getting medications? no    In the past 12 months, has lack of transportation kept you from meetings, work, or from getting things needed for daily living? No       Food Insecurity    Within the past 12 months, you worried that your food would run out before you got the money to buy more. Never true    Within the past 12 months, the food you bought just didn't last and you didn't have money to get more. Never true       Transition Planning    Patient/Family Anticipates Transition to long-term care facility    Patient/Family Anticipated Services at Transition skilled nursing    Transportation Anticipated health plan transportation       Discharge Needs Assessment    Readmission Within the Last 30 Days no previous admission in last 30 days    Equipment Currently Used at Home wheelchair;hospital  bed;lift device    Concerns to be Addressed discharge planning    Anticipated Changes Related to Illness none    Equipment Needed After Discharge none    Discharge Facility/Level of Care Needs nursing facility, skilled    Provided Post Acute Provider List? N/A    Provided Post Acute Provider Quality & Resource List? N/A                   Discharge Plan       Row Name 08/26/24 5407       Plan    Plan D/C Plan: [place] [facility] [place]; Will need transportation assist.    Plan Comments Pt unable to participate in CM assess due to AMS. Call placed to patient brother and guardian, Yossi, to complete assessement. He states that the patient is at  in [facility] and [facility] He is bed bound and requires a lift device, will need transportation assist. DCP sent to  through Epic and message sent to Shauna, facility liaison, to confirm patient is LTC and able to return. Per response [facility] can [facility]. Updated facility pharmacy in [place]. [place] Barriers: O2; IVF; IV ABX                  Continued Care and Services - Admitted Since 8/26/2024       Destination       Service Provider Request Status Selected Services Address Phone Fax Patient Preferred    [facility] - [facility] Pending - Request Sent N/A [address] [place] [address] [phone] --                     Demographic Summary       Row Name 08/26/24 1756       General Information    Admission Type inpatient    Arrived From long-term care    Referral Source emergency department    Reason for Consult discharge planning    Preferred Language English       Contact Information    Permission Granted to Share Info With                    Functional Status       Row Name 08/26/24 1756       Functional Status    Usual Activity Tolerance fair    Current Activity Tolerance poor       Physical Activity    On average, how many days per week do you engage in moderate to strenuous exercise (like a brisk  walk)? 0 days    On average, how many minutes do you engage in exercise at this level? 0 min    Number of minutes of exercise per week 0       Assessment of Health Literacy    How often do you have someone help you read hospital materials? Never    How often do you have problems learning about your medical condition because of difficulty understanding written information? Never    How often do you have a problem understanding what is told to you about your medical condition? Never    How confident are you filling out medical forms by yourself? Not at all    Health Literacy Low       Functional Status, IADL    Medications assistive person;assistive equipment and person    Meal Preparation assistive person;assistive equipment and person    Housekeeping assistive person;assistive equipment and person    Laundry assistive person;assistive equipment and person    Shopping assistive person;assistive equipment and person    IADL Comments Pt is LTC at [place]       Mental Status Summary    Recent Changes in Mental Status/Cognitive Functioning unable to assess       Employment/    Employment Status disabled             Met with patient in room wearing PPE: mask    Maintained distance greater than six feet and spent less than 15 minutes in the room    Lisa Painting RN    Phone [phone]  Fax [phone]

## 2024-08-26 NOTE — H&P
[facility] Medicine Services  History & Physical    Patient Name: Kevin Hurtado  : 1960  MRN: [id number]  Primary Care Physician:  Provider, No Known  Date of admission: 2024  Date and Time of Service: 2024    Subjective      Chief Complaint:  nausea vomiting    History of Present Illness:    This is 64-year-old male with past medical history of CVA and left-sided weakness and dysphagia who is currently on regular diet and thin liquid was sent from nursing facility because of episodes of nausea vomiting and there was concern about aspiration pneumonia.  Patient has some cough and congestion.  CT scan of abdomen pelvis in ER showed distended stomach kidney stone and possible aspiration pneumonia.  Patient is currently being admitted for further management.  Patient is not a good historian and was sleeping    Review of Systems   Unable to obtain    Personal History     Past Medical History:   Diagnosis Date    Elevated cholesterol     GERD (gastroesophageal reflux disease)        History reviewed. No pertinent surgical history.    Family History: family history is not on file. Otherwise pertinent FHx was reviewed and not pertinent to current issue.    Social History:  reports that he has never smoked. His smokeless tobacco use includes snuff. He reports current alcohol use. He reports that he does not use drugs.    Home Medications:  Prior to Admission Medications       Prescriptions Last Dose Informant Patient Reported? Taking?    sulfamethoxazole-trimethoprim (BACTRIM DS,SEPTRA DS) 800-160 MG per tablet   Yes No    Take 1 tablet by mouth Daily.              Allergies:  No Known Allergies    Objective      Vitals:   Temp:  [98.4 °F (36.9 °C)] 98.4 °F (36.9 °C)  Heart Rate:  [] 109  Resp:  [18] 18  BP: (102-133)/(65-80) 104/69  Body mass index is 32.49 kg/m².  Physical Exam:    Constitutional: Patient appears well-developed and well-nourished and in no acute distress   HEENT:   Head:  Normocephalic and atraumatic.   Eyes:  Pupils are equal, round, and reactive to light. EOM are intact. Sclera are anicteric and non-injected.  Mouth and Throat: Patient has moist mucous membranes.      Neck: Neck supple.  No thyromegaly present. No lymphadenopathy present. No  masses.     Cardiovascular: Inspection: No JVD present. Palpation:bilaterally. No leg edema. Auscultation: Regular rate, regular rhythm, S1 normal and S2 normal. reveals no gallop and no friction rub. No Carotid bruit bilaterally.    Pulmonary/Chest: Inspection: No distress, no use of accessory muscles. Lungs are clear to auscultation bilaterally. No respiratory distress. No wheezes. No rhonchi. No rales.     Abdomen /Gastrointestinal: Inspection: no distension. Palpation: no masses, no organomegaly. Soft. There is no tenderness. Bowel sounds are normal.   Extremities no cyanosis clubbing or edema    Neurological: Sleeping  Skin: Skin is warm. No rash noted. Nails show no clubbing.  No cyanosis or erythema. No bruising.      Diagnostic Data:  Results from last 7 days   Lab Units 08/26/24  1302   WBC 10*3/mm3 9.29   HEMOGLOBIN g/dL 13.6   HEMATOCRIT % 43.3   PLATELETS 10*3/mm3 179   GLUCOSE mg/dL 109*   CREATININE mg/dL 1.02   BUN mg/dL 14   SODIUM mmol/L 145   POTASSIUM mmol/L 3.6   AST (SGOT) U/L 43*   ALT (SGPT) U/L 25   ALK PHOS U/L 116   BILIRUBIN mg/dL 1.4*   ANION GAP mmol/L 10.8       CT Abdomen Pelvis With Contrast    Result Date: 8/26/2024  Impression: 1. Moderate air-fluid distention of the stomach with fluid distention of the lower thoracic esophagus. Mild generalized gas in large and small bowel loops. No evidence of active bowel inflammation or bowel obstruction or focal mass lesion. 2. 12 mm left renal pelvic stone with left renal pelvic and proximal left ureteral inflammatory stranding. This stone could serve as a source of intermittent obstruction and inflammation. 2. Nonobstructing bilateral intrarenal calculi. 3. Patchy  airspace disease in the medial right lower lobe may represent atelectasis or developing pneumonia. Scattered atelectatic changes are present elsewhere within the lung bases. 4.. Additional chronic findings as described above. Electronically Signed: Casie Johnson MD  8/26/2024 2:07 PM EDT  Workstation ID: [id number]    XR Chest 1 View    Result Date: 8/26/2024  Impression: No acute chest finding. Electronically Signed: Casie Johnson MD  8/26/2024 12:59 PM EDT  Workstation ID: [id number]     No results found for this or any previous visit.    I have personally reviewed the patient's new results.       Assessment & Plan        Active and Resolved Problems    Episodes of nausea vomiting question coffee-ground emesis  Possible aspiration pneumonia  Left-sided renal pelvis stone  History of CVA with left-sided hemiparesis  Hypertension  History of CHF  History of dysphagia  Hyperlipidemia  Morbid obesity    Suggestion     At this time will admit this patient in hospital  Will consult GI   NPO except sips of water in medication   IV fluid  Empiric antibiotic in the form of Unasyn  Urology consult  May need to get CT scan of chest  Speech to see this patient   PT OT  Panculture      VTE Prophylaxis:  Mechanical VTE prophylaxis orders are signed & held.          The patient desires to be as follows:    CODE STATUS:    Code Status (Patient has no pulse and is not breathing): CPR (Attempt to Resuscitate)  Medical Interventions (Patient has pulse or is breathing): Full Support        Admission Status:      Expected Length of Stay:  2-3 days    PDMP and Medication Dispenses via Sidebar reviewed and consistent with patient reported medications.        Signature:     This document has been electronically signed by Ben Cevallos MD on August 26, 2024 16:05 EDT   [facility] Hospitalist Team

## 2024-08-26 NOTE — SIGNIFICANT NOTE
08/26/24 1832   Living Situation   Current Living Arrangements extended care facility   Potentially Unsafe Housing Conditions none   Food Insecurity   Within the past 12 months, you worried that your food would run out before you got the money to buy more. Never true   Within the past 12 months, the food you bought just didn't last and you didn't have money to get more. Never true   Transportation Needs   In the past 12 months, has lack of transportation kept you from medical appointments or from getting medications? no   In the past 12 months, has lack of transportation kept you from meetings, work, or from getting things needed for daily living? No   Utilities   In the past 12 months has the electric, gas, oil, or water company threatened to shut off services in your home? No   Abuse Screen (yes response referral indicated)   Feels Unsafe at Home or Work/School unable to answer (comment required)   Feels Threatened by Someone unable to answer (comment required)   Does Anyone Try to Keep You From Having Contact with Others or Doing Things Outside Your Home? unable to answer (comment required)   Physical Signs of Abuse Present patient unable to answer   Financial Resource Strain   How hard is it for you to pay for the very basics like food, housing, medical care, and heating? Not very  (Per patient brother mariam)   Employment   Do you want help finding or keeping work or a job? Patient unable to answer   Family and Community Support   If for any reason you need help with day-to-day activities such as bathing, preparing meals, shopping, managing finances, etc., do you get the help you need? Patient unable to answer   How often do you feel lonely or isolated from those around you? Patient unable to answer   Education   Preferred Language English   Do you want help with school or training? For example, starting or completing job training or getting a high school diploma, GED or equivalent Patient unable to answer    Physical Activity   On average, how many days per week do you engage in moderate to strenuous exercise (like a brisk walk)? 0 days   On average, how many minutes do you engage in exercise at this level? 0 min  (Answered per Brother Yossi)   Number of minutes of exercise per week (!) 0   Alcohol Use   Q1: How often do you have a drink containing alcohol? Never   Q2: How many drinks containing alcohol do you have on a typical day when you are drinking? None   Q3: How often do you have six or more drinks on one occasion? Never  (Per brother Yossi)   Stress   Do you feel stress - tense, restless, nervous, or anxious, or unable to sleep at night because your mind is troubled all the time - these days? Pt Unable   Mental Health   Little Interest or Pleasure in Doing Things 98-->patient unable to answer   Feeling Down, Depressed or Hopeless 98-->patient unable to answer   Disabilities   Concentrating, Remembering or Making Decisions Difficulty patient unable to answer   Doing Errands Independently Difficulty (such as shopping) patient unable to answer   SDOH Completion Check   Social Determinants Score 6       SDOH completed via telephone with patient brother and guardian, Yossi Painting RN    Phone [phone]  Fax [phone]

## 2024-08-26 NOTE — ED PROVIDER NOTES
Subjective   History of Present Illness  Chief Complaint: Vomiting    Patient is a 64-year-old male from [facility] with history of hemiplegia hemiparesis due to stroke, depression, CHF, dementia presents the ER with reports of dark emesis today at the facility.  Facility reports dark brown vomiting x 2.  Patient reports some mild abdominal pain and nausea.  No diarrhea hematochezia or melena that was noted.  He denies chest pain but does report shortness of breath.  He does report a mild cough.  No headache lightheadedness or dizziness.  Patient is bedbound.  No reported fever.    PCP: None    History provided by:  Patient      Review of Systems   Constitutional:  Negative for chills and fever.   HENT:  Negative for sore throat and trouble swallowing.    Eyes: Negative.    Respiratory:  Positive for cough and shortness of breath. Negative for wheezing.    Cardiovascular:  Negative for chest pain.   Gastrointestinal:  Positive for abdominal pain, nausea and vomiting.   Endocrine: Negative.    Genitourinary:  Negative for dysuria.   Musculoskeletal:  Negative for myalgias.   Skin:  Negative for rash.   Allergic/Immunologic: Negative.    Neurological:  Negative for weakness and headaches.   Psychiatric/Behavioral:  Negative for behavioral problems.    All other systems reviewed and are negative.      Past Medical History:   Diagnosis Date    Elevated cholesterol     GERD (gastroesophageal reflux disease)     Stroke        No Known Allergies    History reviewed. No pertinent surgical history.    History reviewed. No pertinent family history.    Social History     Socioeconomic History    Marital status: Single   Tobacco Use    Smoking status: Never    Smokeless tobacco: Former     Types: Snuff   Vaping Use    Vaping status: Never Used   Substance and Sexual Activity    Alcohol use: Not Currently     Comment: two bottles of liquor once a month    Drug use: No    Sexual activity: Defer     Partners: Female  "          Objective   Physical Exam  Vitals and nursing note reviewed.   Constitutional:       General: He is not in acute distress.     Appearance: Normal appearance. He is normal weight. He is ill-appearing. He is not diaphoretic.   HENT:      Head: Normocephalic.      Mouth/Throat:      Mouth: Mucous membranes are dry.   Eyes:      Extraocular Movements: Extraocular movements intact.      Pupils: Pupils are equal, round, and reactive to light.   Cardiovascular:      Rate and Rhythm: Regular rhythm. Tachycardia present.      Pulses: Normal pulses.      Heart sounds: Normal heart sounds. No murmur heard.  Pulmonary:      Effort: Pulmonary effort is normal.      Breath sounds: Rhonchi present. No rales.   Abdominal:      General: Abdomen is flat.      Tenderness: There is abdominal tenderness.   Musculoskeletal:         General: No tenderness.   Skin:     General: Skin is warm.      Capillary Refill: Capillary refill takes less than 2 seconds.   Neurological:      General: No focal deficit present.      Mental Status: He is alert. Mental status is at baseline.      Motor: Weakness present.      Comments: Residual left sided weakness from known previous stroke.   Both hands contracted   Psychiatric:         Mood and Affect: Mood normal.         Behavior: Behavior normal.         ECG 12 Lead      Date/Time: 8/27/2024 10:04 PM    Performed by: Lianne Sanders PA  Authorized by: Stanford Kiran MD  Interpreted by ED physician  Comparison: compared with previous ECG   Rhythm: sinus tachycardia  Rate: tachycardic  BPM: 123  QRS axis: normal  Conduction: conduction normal  Clinical impression: non-specific ECG               ED Course    BP 98/69 (BP Location: Left arm, Patient Position: Lying)   Pulse 87   Temp 98 °F (36.7 °C) (Oral)   Resp 19   Ht 175.3 cm (69\")   Wt 99.8 kg (220 lb)   SpO2 97%   BMI 32.49 kg/m²   Labs Reviewed   COMPREHENSIVE METABOLIC PANEL - Abnormal; Notable for the following components: "       Result Value    Glucose 109 (*)     CO2 30.2 (*)     AST (SGOT) 43 (*)     Total Bilirubin 1.4 (*)     All other components within normal limits    Narrative:     GFR Normal >60  Chronic Kidney Disease <60  Kidney Failure <15     URINALYSIS W/ CULTURE IF INDICATED - Abnormal; Notable for the following components:    Appearance, UA Slightly Cloudy (*)     Specific Gravity, UA 1.065 (*)     Ketones, UA Trace (*)     Blood, UA Large (3+) (*)     Protein,  mg/dL (2+) (*)     Leuk Esterase, UA Small (1+) (*)     All other components within normal limits    Narrative:     In absence of clinical symptoms, the presence of pyuria, bacteria, and/or nitrites on the urinalysis result does not correlate with infection.   CBC WITH AUTO DIFFERENTIAL - Abnormal; Notable for the following components:    MCHC 31.4 (*)     Neutrophil % 89.6 (*)     Lymphocyte % 5.8 (*)     Monocyte % 3.1 (*)     Neutrophils, Absolute 8.32 (*)     Lymphocytes, Absolute 0.54 (*)     All other components within normal limits   URINALYSIS, MICROSCOPIC ONLY - Abnormal; Notable for the following components:    RBC, UA 21-50 (*)     WBC, UA 3-5 (*)     Bacteria, UA Trace (*)     Transitional Epithelial Cells, UA 3-6 (*)     All other components within normal limits   PROCALCITONIN - Abnormal; Notable for the following components:    Procalcitonin 0.32 (*)     All other components within normal limits    Narrative:     As a Marker for Sepsis (Non-Neonates):    1. <0.5 ng/mL represents a low risk of severe sepsis and/or septic shock.  2. >2 ng/mL represents a high risk of severe sepsis and/or septic shock.    As a Marker for Lower Respiratory Tract Infections that require antibiotic therapy:    PCT on Admission    Antibiotic Therapy       6-12 Hrs later    >0.5                Strongly Recommended  >0.25 - <0.5        Recommended   0.1 - 0.25          Discouraged              Remeasure/reassess PCT  <0.1                Strongly Discouraged      "Remeasure/reassess PCT    As 28 day mortality risk marker: \"Change in Procalcitonin Result\" (>80% or <=80%) if Day 0 (or Day 1) and Day 4 values are available. Refer to http://[email or web address]-pct-calculator.com    Change in PCT <=80%  A decrease of PCT levels below or equal to 80% defines a positive change in PCT test result representing a higher risk for 28-day all-cause mortality of patients diagnosed with severe sepsis for septic shock.    Change in PCT >80%  A decrease of PCT levels of more than 80% defines a negative change in PCT result representing a lower risk for 28-day all-cause mortality of patients diagnosed with severe sepsis or septic shock.      C-REACTIVE PROTEIN - Abnormal; Notable for the following components:    C-Reactive Protein 8.84 (*)     All other components within normal limits   RESPIRATORY PANEL PCR W/ COVID-19 (SARS-COV-2), NP SWAB IN UTM/VTP, 2 HR TAT - Normal    Narrative:     In the setting of a positive respiratory panel with a viral infection PLUS a negative procalcitonin without other underlying concern for bacterial infection, consider observing off antibiotics or discontinuation of antibiotics and continue supportive care. If the respiratory panel is positive for atypical bacterial infection (Bordetella pertussis, Chlamydophila pneumoniae, or Mycoplasma pneumoniae), consider antibiotic de-escalation to target atypical bacterial infection.   BLOOD CULTURE - Normal   BLOOD CULTURE - Normal   LIPASE - Normal   BNP (IN-HOUSE) - Normal    Narrative:     This assay is used as an aid in the diagnosis of individuals suspected of having heart failure. It can be used as an aid in the diagnosis of acute decompensated heart failure (ADHF) in patients presenting with signs and symptoms of ADHF to the emergency department (ED). In addition, NT-proBNP of <300 pg/mL indicates ADHF is not likely.    Age Range Result Interpretation  NT-proBNP Concentration (pg/mL:      <50             Positive          "   >450                   Gray                 300-450                    Negative             <300    50-75           Positive            >900                  Gray                300-900                  Negative            <300      >75             Positive            >1800                  Gray                300-1800                  Negative            <300   MAGNESIUM - Normal   SEDIMENTATION RATE - Normal   HEPATITIS PANEL, ACUTE - Normal    Narrative:     Results may be falsely decreased if patient taking Biotin.    POC LACTATE - Normal   COMPREHENSIVE METABOLIC PANEL   CBC WITH AUTO DIFFERENTIAL   POC LACTATE   TISSUE PATHOLOGY EXAM   CBC AND DIFFERENTIAL    Narrative:     The following orders were created for panel order CBC & Differential.  Procedure                               Abnormality         Status                     ---------                               -----------         ------                     CBC Auto Differential[[id number]]        Abnormal            Final result                 Please view results for these tests on the individual orders.   EXTRA TUBES    Narrative:     The following orders were created for panel order Extra Tubes.  Procedure                               Abnormality         Status                     ---------                               -----------         ------                     Gold Top - SST[[id number]]                                   Final result               Light Blue Top[[id number]]                                   Final result                 Please view results for these tests on the individual orders.   GOLD TOP - SST   LIGHT BLUE TOP   CBC AND DIFFERENTIAL    Narrative:     The following orders were created for panel order CBC & Differential.  Procedure                               Abnormality         Status                     ---------                               -----------         ------                     CBC Auto Differential[[id number]]                                                          Please view results for these tests on the individual orders.     Medications   sodium chloride 0.9 % flush 10 mL (10 mL Intravenous Given 8/27/24 0758)   pantoprazole (PROTONIX) injection 40 mg (40 mg Intravenous Given 8/27/24 1743)   ampicillin-sulbactam (UNASYN) 3 g in sodium chloride 0.9 % 100 mL MBP (3 g Intravenous New Bag 8/27/24 1742)   sodium chloride 0.9 % infusion (50 mL/hr Intravenous New Bag 8/27/24 1925)   HYDROcodone-acetaminophen (NORCO) 5-325 MG per tablet 1 tablet (1 tablet Oral Given 8/27/24 1748)   ondansetron (ZOFRAN) injection 4 mg (has no administration in time range)   labetalol (NORMODYNE,TRANDATE) injection 5 mg (has no administration in time range)   hydrALAZINE (APRESOLINE) injection 5 mg (has no administration in time range)   ePHEDrine Sulfate (Pressors) 5 MG/ML injection 5 mg (has no administration in time range)   atropine sulfate injection 0.5 mg (has no administration in time range)   lidocaine (cardiac) (XYLOCAINE) injection 100 mg (has no administration in time range)   diphenhydrAMINE (BENADRYL) injection 12.5 mg (has no administration in time range)   ipratropium-albuterol (DUO-NEB) nebulizer solution 3 mL (has no administration in time range)   polyethylene glycol (MIRALAX) packet 17 g (17 g Oral Given 8/27/24 1743)   ondansetron (ZOFRAN) injection 4 mg (4 mg Intravenous Given 8/26/24 1304)   iopamidol (ISOVUE-370) 76 % injection 100 mL (100 mL Intravenous Given 8/26/24 1353)   cefTRIAXone (ROCEPHIN) 2,000 mg in sodium chloride 0.9 % 100 mL MBP (0 mg Intravenous Stopped 8/26/24 1622)     CT Chest Without Contrast Diagnostic    Result Date: 8/26/2024  Impression: 1.Mild patchy airspace disease present within the posterior aspect of the lower lobes bilaterally, right greater than left, likely related to a mild pneumonia and atelectasis.. 2.Ancillary findings as described above. Electronically Signed: Evelyn Barnett MD  8/26/2024  11:05 PM EDT  Workstation ID: [id number]    CT Abdomen Pelvis With Contrast    Result Date: 8/26/2024  Impression: 1. Moderate air-fluid distention of the stomach with fluid distention of the lower thoracic esophagus. Mild generalized gas in large and small bowel loops. No evidence of active bowel inflammation or bowel obstruction or focal mass lesion. 2. 12 mm left renal pelvic stone with left renal pelvic and proximal left ureteral inflammatory stranding. This stone could serve as a source of intermittent obstruction and inflammation. 2. Nonobstructing bilateral intrarenal calculi. 3. Patchy airspace disease in the medial right lower lobe may represent atelectasis or developing pneumonia. Scattered atelectatic changes are present elsewhere within the lung bases. 4.. Additional chronic findings as described above. Electronically Signed: Casie Johnson MD  8/26/2024 2:07 PM EDT  Workstation ID: [id number]    XR Chest 1 View    Result Date: 8/26/2024  Impression: No acute chest finding. Electronically Signed: Casie Johnson MD  8/26/2024 12:59 PM EDT  Workstation ID: [id number]                                            Medical Decision Making  Differential Dx (Includes but not limited to): SBO, UTI, PNA, diverticulitis, pancreatitis, gastritis, perforation    Chart Review: Patient had follow up visit with neurology 8/2022 after cranioplasty - had CVA in 2022    While in the ED IV was placed and labs were obtained appropriate PPE was worn during exam and throughout all encounters with the patient. Patient had the above evaluation. Per EMS and nursing facility, patient vomited dark brown emesis. Patient currently has no complaints. Patient was given IV zofran for nausea as well as protonix. Lab work significant for normal lactate, normal CBC, magnesium, lipase. Essentially unremarkable CMP. UA suggestive of UTI. CT Abd and Pelvis very abnormal, showing moderate air-fluid distention of the stomach with fluid in the lower  esophagus, no obvious obstruction, but likely due to ileus. Patient is baseline sedentary due to hx of CVA. Large renal pelvis stone with ureteral inflammatory stranding as well as airspace disease in the medial right lower lobe suggestive of PNA. Blood cultures obtained, patient was given IV antibiotics and plan to be admitted for further treatment, IV abx and likely GI and Urology consultation. I spoke with Dr. Cevallos regarding admission.    Patient case was discussed with ER attending Dr. Brewer, CT imaging was reviewed with ER attending also.     Problems Addressed:  Ileus: acute illness or injury  Pneumonia of right lower lobe due to infectious organism: acute illness or injury  Ureterolithiasis: acute illness or injury  Vomiting, unspecified vomiting type, unspecified whether nausea present: acute illness or injury    Amount and/or Complexity of Data Reviewed  External Data Reviewed: notes.  Labs: ordered. Decision-making details documented in ED Course.  Radiology: ordered. Decision-making details documented in ED Course.  ECG/medicine tests:  Decision-making details documented in ED Course.    Risk  Prescription drug management.  Decision regarding hospitalization.        Final diagnoses:   Pneumonia of right lower lobe due to infectious organism   Ileus   Ureterolithiasis   Vomiting, unspecified vomiting type, unspecified whether nausea present       ED Disposition  ED Disposition       ED Disposition   Decision to Admit    Condition   --    Comment   Level of Care: Telemetry [5]   Admitting Physician: YAZAN CEVALLOS [[id number]]   Attending Physician: YAZAN CEVALLOS [[id number]]                 No follow-up provider specified.       Medication List      No changes were made to your prescriptions during this visit.            Lianne Sanders PA  08/27/24 1365

## 2024-08-27 ENCOUNTER — INPATIENT HOSPITAL (OUTPATIENT)
Age: 64
End: 2024-08-27
Payer: MEDICAID

## 2024-08-27 ENCOUNTER — ANESTHESIA (OUTPATIENT)
Dept: GASTROENTEROLOGY | Facility: HOSPITAL | Age: 64
End: 2024-08-27
Payer: MEDICAID

## 2024-08-27 ENCOUNTER — ANESTHESIA EVENT (OUTPATIENT)
Dept: GASTROENTEROLOGY | Facility: HOSPITAL | Age: 64
End: 2024-08-27
Payer: MEDICAID

## 2024-08-27 ENCOUNTER — INPATIENT HOSPITAL (OUTPATIENT)
Dept: URBAN - METROPOLITAN AREA HOSPITAL 84 | Facility: HOSPITAL | Age: 64
End: 2024-08-27
Payer: MEDICAID

## 2024-08-27 DIAGNOSIS — K92.0 HEMATEMESIS: ICD-10-CM

## 2024-08-27 DIAGNOSIS — K22.70 BARRETT'S ESOPHAGUS WITHOUT DYSPLASIA: ICD-10-CM

## 2024-08-27 DIAGNOSIS — K44.9 DIAPHRAGMATIC HERNIA WITHOUT OBSTRUCTION OR GANGRENE: ICD-10-CM

## 2024-08-27 DIAGNOSIS — R93.3 ABNORMAL FINDINGS ON DIAGNOSTIC IMAGING OF OTHER PARTS OF DI: ICD-10-CM

## 2024-08-27 DIAGNOSIS — K22.9 DISEASE OF ESOPHAGUS, UNSPECIFIED: ICD-10-CM

## 2024-08-27 LAB
CRP SERPL-MCNC: 8.84 MG/DL (ref 0–0.5)
ERYTHROCYTE [SEDIMENTATION RATE] IN BLOOD: 13 MM/HR (ref 0–20)
HAV IGM SERPL QL IA: NORMAL
HBV CORE IGM SERPL QL IA: NORMAL
HBV SURFACE AG SERPL QL IA: NORMAL
HCV AB SER QL: NORMAL
QT INTERVAL: 319 MS
QTC INTERVAL: 457 MS

## 2024-08-27 PROCEDURE — 43239 EGD BIOPSY SINGLE/MULTIPLE: CPT | Performed by: INTERNAL MEDICINE

## 2024-08-27 PROCEDURE — 25810000003 SODIUM CHLORIDE 0.9 % SOLUTION: Performed by: INTERNAL MEDICINE

## 2024-08-27 PROCEDURE — 97163 PT EVAL HIGH COMPLEX 45 MIN: CPT

## 2024-08-27 PROCEDURE — 85652 RBC SED RATE AUTOMATED: CPT | Performed by: INTERNAL MEDICINE

## 2024-08-27 PROCEDURE — 25010000002 AMPICILLIN-SULBACTAM PER 1.5 G: Performed by: INTERNAL MEDICINE

## 2024-08-27 PROCEDURE — 0DB58ZX EXCISION OF ESOPHAGUS, VIA NATURAL OR ARTIFICIAL OPENING ENDOSCOPIC, DIAGNOSTIC: ICD-10-PCS | Performed by: INTERNAL MEDICINE

## 2024-08-27 PROCEDURE — 25010000002 PROPOFOL 10 MG/ML EMULSION: Performed by: NURSE ANESTHETIST, CERTIFIED REGISTERED

## 2024-08-27 PROCEDURE — 86140 C-REACTIVE PROTEIN: CPT | Performed by: INTERNAL MEDICINE

## 2024-08-27 PROCEDURE — 88305 TISSUE EXAM BY PATHOLOGIST: CPT | Performed by: INTERNAL MEDICINE

## 2024-08-27 PROCEDURE — 25810000003 SODIUM CHLORIDE 0.9 % SOLUTION: Performed by: NURSE ANESTHETIST, CERTIFIED REGISTERED

## 2024-08-27 RX ORDER — HYDROCODONE BITARTRATE AND ACETAMINOPHEN 5; 325 MG/1; MG/1
1 TABLET ORAL EVERY 6 HOURS PRN
Status: DISCONTINUED | OUTPATIENT
Start: 2024-08-27 | End: 2024-08-28 | Stop reason: HOSPADM

## 2024-08-27 RX ORDER — LIDOCAINE HYDROCHLORIDE 20 MG/ML
INJECTION, SOLUTION INFILTRATION; PERINEURAL AS NEEDED
Status: DISCONTINUED | OUTPATIENT
Start: 2024-08-27 | End: 2024-08-27 | Stop reason: SURG

## 2024-08-27 RX ORDER — IPRATROPIUM BROMIDE AND ALBUTEROL SULFATE 2.5; .5 MG/3ML; MG/3ML
3 SOLUTION RESPIRATORY (INHALATION) ONCE AS NEEDED
Status: DISCONTINUED | OUTPATIENT
Start: 2024-08-27 | End: 2024-08-28 | Stop reason: HOSPADM

## 2024-08-27 RX ORDER — HYDRALAZINE HYDROCHLORIDE 20 MG/ML
5 INJECTION INTRAMUSCULAR; INTRAVENOUS
Status: DISCONTINUED | OUTPATIENT
Start: 2024-08-27 | End: 2024-08-28 | Stop reason: HOSPADM

## 2024-08-27 RX ORDER — PROPOFOL 10 MG/ML
VIAL (ML) INTRAVENOUS AS NEEDED
Status: DISCONTINUED | OUTPATIENT
Start: 2024-08-27 | End: 2024-08-27 | Stop reason: SURG

## 2024-08-27 RX ORDER — LABETALOL HYDROCHLORIDE 5 MG/ML
5 INJECTION, SOLUTION INTRAVENOUS
Status: DISCONTINUED | OUTPATIENT
Start: 2024-08-27 | End: 2024-08-28 | Stop reason: HOSPADM

## 2024-08-27 RX ORDER — MEPERIDINE HYDROCHLORIDE 25 MG/ML
12.5 INJECTION INTRAMUSCULAR; INTRAVENOUS; SUBCUTANEOUS
Status: DISCONTINUED | OUTPATIENT
Start: 2024-08-27 | End: 2024-08-27 | Stop reason: HOSPADM

## 2024-08-27 RX ORDER — SODIUM CHLORIDE 9 MG/ML
INJECTION, SOLUTION INTRAVENOUS CONTINUOUS PRN
Status: DISCONTINUED | OUTPATIENT
Start: 2024-08-27 | End: 2024-08-27 | Stop reason: SURG

## 2024-08-27 RX ORDER — EPHEDRINE SULFATE 5 MG/ML
5 INJECTION INTRAVENOUS ONCE AS NEEDED
Status: DISCONTINUED | OUTPATIENT
Start: 2024-08-27 | End: 2024-08-28 | Stop reason: HOSPADM

## 2024-08-27 RX ORDER — ONDANSETRON 2 MG/ML
4 INJECTION INTRAMUSCULAR; INTRAVENOUS ONCE AS NEEDED
Status: DISCONTINUED | OUTPATIENT
Start: 2024-08-27 | End: 2024-08-28 | Stop reason: HOSPADM

## 2024-08-27 RX ORDER — POLYETHYLENE GLYCOL 3350 17 G/17G
17 POWDER, FOR SOLUTION ORAL DAILY
Status: DISCONTINUED | OUTPATIENT
Start: 2024-08-27 | End: 2024-08-28 | Stop reason: HOSPADM

## 2024-08-27 RX ORDER — DIPHENHYDRAMINE HYDROCHLORIDE 50 MG/ML
12.5 INJECTION INTRAMUSCULAR; INTRAVENOUS
Status: DISCONTINUED | OUTPATIENT
Start: 2024-08-27 | End: 2024-08-28 | Stop reason: HOSPADM

## 2024-08-27 RX ADMIN — PROPOFOL 40 MG: 10 INJECTION, EMULSION INTRAVENOUS at 14:22

## 2024-08-27 RX ADMIN — AMPICILLIN SODIUM AND SULBACTAM SODIUM 3 G: 2; 1 INJECTION, POWDER, FOR SOLUTION INTRAMUSCULAR; INTRAVENOUS at 17:42

## 2024-08-27 RX ADMIN — PROPOFOL 50 MG: 10 INJECTION, EMULSION INTRAVENOUS at 14:13

## 2024-08-27 RX ADMIN — POLYETHYLENE GLYCOL 3350 17 G: 17 POWDER, FOR SOLUTION ORAL at 17:43

## 2024-08-27 RX ADMIN — PROPOFOL 40 MG: 10 INJECTION, EMULSION INTRAVENOUS at 14:24

## 2024-08-27 RX ADMIN — LIDOCAINE HYDROCHLORIDE 100 MG: 20 INJECTION, SOLUTION INFILTRATION; PERINEURAL at 14:13

## 2024-08-27 RX ADMIN — PROPOFOL 30 MG: 10 INJECTION, EMULSION INTRAVENOUS at 14:25

## 2024-08-27 RX ADMIN — AMPICILLIN SODIUM AND SULBACTAM SODIUM 3 G: 2; 1 INJECTION, POWDER, FOR SOLUTION INTRAMUSCULAR; INTRAVENOUS at 01:41

## 2024-08-27 RX ADMIN — HYDROCODONE BITARTRATE AND ACETAMINOPHEN 1 TABLET: 5; 325 TABLET ORAL at 23:56

## 2024-08-27 RX ADMIN — PANTOPRAZOLE SODIUM 40 MG: 40 INJECTION, POWDER, FOR SOLUTION INTRAVENOUS at 17:43

## 2024-08-27 RX ADMIN — PANTOPRAZOLE SODIUM 40 MG: 40 INJECTION, POWDER, FOR SOLUTION INTRAVENOUS at 07:58

## 2024-08-27 RX ADMIN — SODIUM CHLORIDE: 9 INJECTION, SOLUTION INTRAVENOUS at 14:07

## 2024-08-27 RX ADMIN — PROPOFOL 30 MG: 10 INJECTION, EMULSION INTRAVENOUS at 14:17

## 2024-08-27 RX ADMIN — AMPICILLIN SODIUM AND SULBACTAM SODIUM 3 G: 2; 1 INJECTION, POWDER, FOR SOLUTION INTRAMUSCULAR; INTRAVENOUS at 11:22

## 2024-08-27 RX ADMIN — Medication 10 ML: at 07:58

## 2024-08-27 RX ADMIN — HYDROCODONE BITARTRATE AND ACETAMINOPHEN 1 TABLET: 5; 325 TABLET ORAL at 17:48

## 2024-08-27 RX ADMIN — PROPOFOL 25 MG: 10 INJECTION, EMULSION INTRAVENOUS at 14:15

## 2024-08-27 RX ADMIN — PROPOFOL 30 MG: 10 INJECTION, EMULSION INTRAVENOUS at 14:21

## 2024-08-27 RX ADMIN — PROPOFOL 25 MG: 10 INJECTION, EMULSION INTRAVENOUS at 14:19

## 2024-08-27 RX ADMIN — SODIUM CHLORIDE 50 ML/HR: 9 INJECTION, SOLUTION INTRAVENOUS at 19:25

## 2024-08-27 NOTE — CONSULTS
Urology Consult Note    Patient:Kevin Hurtado :1960  Room:[facility]  Admit Date2024  Age:64 y.o.     SEX:male     DOS:2024     MR:[id number]     Visit:[id number]       Attending: Stanford Kiran MD  Referring Provider: Dr. Kiran  Reason for Consultation: Left renal calculus    Patient Care Team:  Provider, No Known as PCP - General    Chief complaint vomiting    Subjective .     History of present illness: 64-year-old gentleman who was admitted with nausea and vomiting following severe coughing yesterday.  He has been found to have pneumonia.  CT scan also revealed a 12 mm left renal pelvic stone though there is no evidence of obstruction.  Patient denies any left flank pain.    Review of Systems  10 point review of systems were reviewed and are negative except for:  Constitution:  positive for See HPI    History  Past Medical History:   Diagnosis Date    Elevated cholesterol     GERD (gastroesophageal reflux disease)     Stroke      History reviewed. No pertinent surgical history.  Social History     Socioeconomic History    Marital status: Single   Tobacco Use    Smoking status: Never    Smokeless tobacco: Former     Types: Snuff   Vaping Use    Vaping status: Never Used   Substance and Sexual Activity    Alcohol use: Not Currently     Comment: two bottles of liquor once a month    Drug use: No    Sexual activity: Defer     Partners: Female     History reviewed. No pertinent family history.  Allergy  No Known Allergies  Prior to Admission medications    Medication Sig Start Date End Date Taking? Authorizing Provider   aluminum-magnesium hydroxide-simethicone (MAALOX/MYLANTA) 200-200-20 MG/5ML suspension Take 30 mL by mouth Every 8 (Eight) Hours As Needed for Indigestion or Heartburn.    ProviderJose L MD   aspirin 81 MG chewable tablet Chew 1 tablet Every Morning.    ProviderJose L MD   atorvastatin (LIPITOR) 40 MG tablet Take 1 tablet by mouth Every Night.    Provider  MD Jose L   baclofen (LIORESAL) 10 MG tablet Take 1 tablet by mouth 3 (Three) Times a Day.    Jose L Ferrer MD   calcium carbonate (TUMS) 500 MG chewable tablet Chew 1 tablet 3 (Three) Times a Day.    Jose L Ferrer MD   carboxymethylcellulose (REFRESH PLUS) 0.5 % solution Administer 2 drops to both eyes 2 (Two) Times a Day.    Jose L Ferrer MD   ezetimibe (ZETIA) 10 MG tablet Take 1 tablet by mouth Every Morning.    Jose L Ferrer MD   famotidine (PEPCID) 20 MG tablet Take 1 tablet by mouth 2 (Two) Times a Day.    Jose L Ferrer MD   gabapentin (NEURONTIN) 400 MG capsule Take 1 capsule by mouth 3 (Three) Times a Day.    Jose L Ferrer MD   HYDROcodone-acetaminophen (NORCO)  MG per tablet Take 1 tablet by mouth 3 (Three) Times a Day.    Jose L Ferrer MD   ibuprofen (ADVIL,MOTRIN) 600 MG tablet Take 1 tablet by mouth Every 8 (Eight) Hours As Needed for Mild Pain.    Jose L Ferrer MD   ketoconazole (NIZORAL) 2 % shampoo Apply 1 Application topically to the appropriate area as directed 1 (One) Time Per Week. Apply to scalp topically on dayshift every Wednesday, Saturday for tinea versicolor for 6 months    Jose L Ferrer MD   Lidocaine 4 % aerosol Place 1 Application on the skin as directed by provider 2 (Two) Times a Day. Apply to R 4th toe distal topically every morning and at bedtime for pain    Jose L Ferrer MD   meclizine (ANTIVERT) 25 MG tablet Take 1 tablet by mouth Every 8 (Eight) Hours As Needed for Dizziness.    Jose L Ferrer MD   melatonin 1 MG tablet Take 3 tablets by mouth Every Night.    Jose L Ferrer MD   ondansetron (ZOFRAN) 4 MG tablet Take 1 tablet by mouth Every 6 (Six) Hours As Needed for Nausea or Vomiting.    Jose L Ferrer MD   Scopolamine 1 MG/3DAYS patch Place 1 patch on the skin as directed by provider Every 72 (Seventy-Two) Hours.    Jose L Ferrer MD   Skin Protectants, Misc.  (Eucerin) cream Apply 1 Application topically to the appropriate area as directed 2 (Two) Times a Day. Apply to bilateral arms topically every shift for eczema    ProviderJose L MD   tamsulosin (FLOMAX) 0.4 MG capsule 24 hr capsule Take 1 capsule by mouth Every Morning.    Jose L Ferrer MD   white petrolatum ointment Apply 1 Application topically to the appropriate area as directed every night at bedtime. Instill 1 application in both eyes at bedtime for eye maintenance 1/2 inch ribbon outer lid    ProviderJose L MD         Objective     tMax 24 hours:  Temp (24hrs), Av.9 °F (36.6 °C), Min:97.3 °F (36.3 °C), Max:98.4 °F (36.9 °C)    Vital Sign Ranges:  Temp:  [97.3 °F (36.3 °C)-98.4 °F (36.9 °C)] 98.2 °F (36.8 °C)  Heart Rate:  [] 78  Resp:  [16-21] 20  BP: ()/(62-84) 101/69  Intake and Output Last 3 Shifts:  I/O last 3 completed shifts:  In: 100 [IV Piggyback:100]  Out: 150 [Urine:150]      Physical Exam:   General Appearance alert, appears stated age, and cooperative  Head normocephalic, without obvious abnormality and atraumatic  Abdomen no guarding and no rebound tenderness  Skin no bleeding, bruising or rash  Neurologic Mental Status orientated to person, place, time and situation    Results Review:     Lab Results (last 24 hours)       Procedure Component Value Units Date/Time    Hepatitis Panel, Acute [[id number]] Collected: 24 1302    Specimen: Blood Updated: 24 1001    C-reactive Protein [[id number]]  (Abnormal) Collected: 24 0140    Specimen: Blood from Arm, Right Updated: 24 0229     C-Reactive Protein 8.84 mg/dL     Sedimentation Rate [[id number]]  (Normal) Collected: 24 0140    Specimen: Blood from Arm, Right Updated: 24 0220     Sed Rate 13 mm/hr     Procalcitonin [[id number]]  (Abnormal) Collected: 24 1302    Specimen: Blood Updated: 24 1622     Procalcitonin 0.32 ng/mL     Narrative:      As a Marker for Sepsis  "(Non-Neonates):    1. <0.5 ng/mL represents a low risk of severe sepsis and/or septic shock.  2. >2 ng/mL represents a high risk of severe sepsis and/or septic shock.    As a Marker for Lower Respiratory Tract Infections that require antibiotic therapy:    PCT on Admission    Antibiotic Therapy       6-12 Hrs later    >0.5                Strongly Recommended  >0.25 - <0.5        Recommended   0.1 - 0.25          Discouraged              Remeasure/reassess PCT  <0.1                Strongly Discouraged     Remeasure/reassess PCT    As 28 day mortality risk marker: \"Change in Procalcitonin Result\" (>80% or <=80%) if Day 0 (or Day 1) and Day 4 values are available. Refer to [email or web address]    Change in PCT <=80%  A decrease of PCT levels below or equal to 80% defines a positive change in PCT test result representing a higher risk for 28-day all-cause mortality of patients diagnosed with severe sepsis for septic shock.    Change in PCT >80%  A decrease of PCT levels of more than 80% defines a negative change in PCT result representing a lower risk for 28-day all-cause mortality of patients diagnosed with severe sepsis or septic shock.       POC Lactate [[id number]]  (Normal) Collected: 08/26/24 1550    Specimen: Blood Updated: 08/26/24 1552     Lactate 1.9 mmol/L      Comment: Serial Number: [id number]:  [id number]       Blood Culture - Blood, Arm, Right [[id number]] Collected: 08/26/24 1547    Specimen: Blood from Arm, Right Updated: 08/26/24 1550    Blood Culture - Blood, Arm, Right [[id number]] Collected: 08/26/24 1302    Specimen: Blood from Arm, Right Updated: 08/26/24 1550    Urinalysis, Microscopic Only - Straight Cath [[id number]]  (Abnormal) Collected: 08/26/24 1459    Specimen: Urine from Straight Cath Updated: 08/26/24 1529     RBC, UA 21-50 /HPF      WBC, UA 3-5 /HPF      Comment: Urine culture not indicated.        Bacteria, UA Trace /HPF      Squamous Epithelial Cells, UA 0-2 /HPF     "  Transitional Epithelial Cells, UA 3-6 /HPF      Hyaline Casts, UA None Seen /LPF      Calcium Oxalate Crystals, UA Moderate/2+ /HPF      Methodology Manual Light Microscopy    Urinalysis With Culture If Indicated - Straight Cath [[id number]]  (Abnormal) Collected: 08/26/24 1459    Specimen: Urine from Straight Cath Updated: 08/26/24 1521     Color, UA Yellow     Appearance, UA Slightly Cloudy     pH, UA <=5.0     Specific Gravity, UA 1.065     Glucose, UA Negative     Ketones, UA Trace     Bilirubin, UA Negative     Blood, UA Large (3+)     Protein,  mg/dL (2+)     Leuk Esterase, UA Small (1+)     Nitrite, UA Negative     Urobilinogen, UA 1.0 E.U./dL    Narrative:      In absence of clinical symptoms, the presence of pyuria, bacteria, and/or nitrites on the urinalysis result does not correlate with infection.    Respiratory Panel PCR w/COVID-19(SARS-CoV-2) KEON/THIAGO/MJ/PAD/COR/BIGG In-House, NP Swab in UTM/VTM, 2 HR TAT - Swab, Nasopharynx [[id number]]  (Normal) Collected: 08/26/24 1302    Specimen: Swab from Nasopharynx Updated: 08/26/24 1359     ADENOVIRUS, PCR Not Detected     Coronavirus 229E Not Detected     Coronavirus HKU1 Not Detected     Coronavirus NL63 Not Detected     Coronavirus OC43 Not Detected     COVID19 Not Detected     Human Metapneumovirus Not Detected     Human Rhinovirus/Enterovirus Not Detected     Influenza A PCR Not Detected     Influenza B PCR Not Detected     Parainfluenza Virus 1 Not Detected     Parainfluenza Virus 2 Not Detected     Parainfluenza Virus 3 Not Detected     Parainfluenza Virus 4 Not Detected     RSV, PCR Not Detected     Bordetella pertussis pcr Not Detected     Bordetella parapertussis PCR Not Detected     Chlamydophila pneumoniae PCR Not Detected     Mycoplasma pneumo by PCR Not Detected    Narrative:      In the setting of a positive respiratory panel with a viral infection PLUS a negative procalcitonin without other underlying concern for bacterial infection,  consider observing off antibiotics or discontinuation of antibiotics and continue supportive care. If the respiratory panel is positive for atypical bacterial infection (Bordetella pertussis, Chlamydophila pneumoniae, or Mycoplasma pneumoniae), consider antibiotic de-escalation to target atypical bacterial infection.    BNP [[id number]]  (Normal) Collected: 08/26/24 1302    Specimen: Blood Updated: 08/26/24 1344     proBNP 243.0 pg/mL     Narrative:      This assay is used as an aid in the diagnosis of individuals suspected of having heart failure. It can be used as an aid in the diagnosis of acute decompensated heart failure (ADHF) in patients presenting with signs and symptoms of ADHF to the emergency department (ED). In addition, NT-proBNP of <300 pg/mL indicates ADHF is not likely.    Age Range Result Interpretation  NT-proBNP Concentration (pg/mL:      <50             Positive            >450                   Gray                 300-450                    Negative             <300    50-75           Positive            >900                  Gray                300-900                  Negative            <300      >75             Positive            >1800                  Gray                300-1800                  Negative            <300    Magnesium [[id number]]  (Normal) Collected: 08/26/24 1302    Specimen: Blood Updated: 08/26/24 1344     Magnesium 1.6 mg/dL     Comprehensive Metabolic Panel [[id number]]  (Abnormal) Collected: 08/26/24 1302    Specimen: Blood Updated: 08/26/24 1344     Glucose 109 mg/dL      BUN 14 mg/dL      Creatinine 1.02 mg/dL      Sodium 145 mmol/L      Potassium 3.6 mmol/L      Chloride 104 mmol/L      CO2 30.2 mmol/L      Calcium 8.8 mg/dL      Total Protein 6.9 g/dL      Albumin 3.8 g/dL      ALT (SGPT) 25 U/L      AST (SGOT) 43 U/L      Alkaline Phosphatase 116 U/L      Total Bilirubin 1.4 mg/dL      Globulin 3.1 gm/dL      A/G Ratio 1.2 g/dL      BUN/Creatinine Ratio 13.7      Anion Gap 10.8 mmol/L      eGFR 82.6 mL/min/1.73     Narrative:      GFR Normal >60  Chronic Kidney Disease <60  Kidney Failure <15      Lipase [[id number]]  (Normal) Collected: 08/26/24 1302    Specimen: Blood Updated: 08/26/24 1344     Lipase 13 U/L     Extra Tubes [[id number]] Collected: 08/26/24 1302    Specimen: Blood Updated: 08/26/24 1315    Narrative:      The following orders were created for panel order Extra Tubes.  Procedure                               Abnormality         Status                     ---------                               -----------         ------                     Gold Top - SST[[id number]]                                   Final result               Light Blue Top[[id number]]                                   Final result                 Please view results for these tests on the individual orders.    Gold Top - SST [[id number]] Collected: 08/26/24 1302    Specimen: Blood Updated: 08/26/24 1315     Extra Tube Hold for add-ons.     Comment: Auto resulted.       Light Blue Top [[id number]] Collected: 08/26/24 1302    Specimen: Blood Updated: 08/26/24 1315     Extra Tube Hold for add-ons.     Comment: Auto resulted       CBC & Differential [[id number]]  (Abnormal) Collected: 08/26/24 1302    Specimen: Blood Updated: 08/26/24 1314    Narrative:      The following orders were created for panel order CBC & Differential.  Procedure                               Abnormality         Status                     ---------                               -----------         ------                     CBC Auto Differential[[id number]]        Abnormal            Final result                 Please view results for these tests on the individual orders.    CBC Auto Differential [[id number]]  (Abnormal) Collected: 08/26/24 1302    Specimen: Blood Updated: 08/26/24 1314     WBC 9.29 10*3/mm3      RBC 4.68 10*6/mm3      Hemoglobin 13.6 g/dL      Hematocrit 43.3 %      MCV 92.5 fL      MCH 29.1 pg      MCHC 31.4  "g/dL      RDW 15.0 %      RDW-SD 50.4 fl      MPV 9.8 fL      Platelets 179 10*3/mm3      Neutrophil % 89.6 %      Lymphocyte % 5.8 %      Monocyte % 3.1 %      Eosinophil % 0.9 %      Basophil % 0.2 %      Immature Grans % 0.4 %      Neutrophils, Absolute 8.32 10*3/mm3      Lymphocytes, Absolute 0.54 10*3/mm3      Monocytes, Absolute 0.29 10*3/mm3      Eosinophils, Absolute 0.08 10*3/mm3      Basophils, Absolute 0.02 10*3/mm3      Immature Grans, Absolute 0.04 10*3/mm3      nRBC 0.0 /100 WBC            No results found for: \"URINECX\"     Imaging Results (Last 7 Days)       Procedure Component Value Units Date/Time    CT Chest Without Contrast Diagnostic [[id number]] Collected: 08/26/24 2304     Updated: 08/26/24 2307    Narrative:      CT CHEST WO CONTRAST DIAGNOSTIC    Date of Exam: 8/26/2024 8:03 PM EDT    Indication: Possible pneumonia.    Comparison: 8/26/2024.    Technique: Axial CT images were obtained of the chest without contrast administration.  Sagittal and coronal reconstructions were performed.  Automated exposure control and iterative reconstruction methods were used.      Findings:  Hilum and Mediastinum: No pathologically enlarged lymph nodes. The heart appears mildly enlarged. Atherosclerotic calcifications are present including within the coronary arteries. Granulomatous calcifications are present.   No pericardial effusion.    Unremarkable thoracic aorta and pulmonary arteries.    Lung Parenchyma and Pleura: Mild patchy airspace disease is present within the posterior aspect of the lower lobes bilaterally, right greater than left. Mild air bronchograms are present particularly on the right. Probable mild atelectatic changes are   present within the lingula. No significant pleural effusion identified. No focal pulmonary nodule identified.  Venous changes are present..    Upper Abdomen: No acute process.     Soft tissues: Unremarkable.    Osseous structures: No aggressive focal lytic or sclerotic " osseous lesions.      Impression:      Impression:  1.Mild patchy airspace disease present within the posterior aspect of the lower lobes bilaterally, right greater than left, likely related to a mild pneumonia and atelectasis..  2.Ancillary findings as described above.        Electronically Signed: Evelyn Barnett MD    8/26/2024 11:05 PM EDT    Workstation ID: [id number]    CT Abdomen Pelvis With Contrast [[id number]] Collected: 08/26/24 1401     Updated: 08/26/24 1409    Narrative:      CT ABDOMEN PELVIS W CONTRAST    Date of Exam: 8/26/2024 1:52 PM EDT    Indication: abd pain, n/v.    Comparison: None available.    Technique: Axial CT images were obtained of the abdomen and pelvis following the uneventful intravenous administration of iodinated contrast. Sagittal and coronal reconstructions were performed.  Automated exposure control and iterative reconstruction   methods were used.        Findings:  Patchy airspace disease in the posterior medial right lower lobe could represent atelectasis or early pneumonia. Mild atelectasis is suggested posteriorly within the left upper lobe, left lower lobe, and within the right middle lobe. Benign calcified   nodule is seen in the left upper lobe. There is mild cardiac enlargement. Coronary artery calcifications are present. No pericardial effusion or pleural effusion is seen.    There is mild fluid distention of the lower thoracic esophagus. There is moderate air-fluid distention of the stomach. The large and small bowel do not appear inflamed or obstructed. There is mild generalized gas-distention of large and small bowel. Mild   to moderate colonic stool burden is present. The appendix appears normal. No adenopathy. No pneumatosis. No free air or free fluid.    1.2 cm left renal pelvic stone is present. There is left renal parapelvic and proximal periureteric inflammatory stranding. No nathalia left hydronephrosis is seen. Small nonobstructing bilateral intrarenal calculi are  present, greatest on the left. Small   cyst is seen at the left lower renal pole.    The liver, gallbladder, spleen, pancreas, adrenals are normal. The urinary bladder, prostate, and rectum are normal. There is a left upper thigh superficial edema.    Moderate to severe calcific atherosclerosis is demonstrated within the femoral arteries within each upper thigh.    Advanced degenerative changes within the spine, greatest at L3-4 through L5-S1.. Degenerative changes of the hips, pubic symphysis, sacroiliac joints. No acute osseous abnormalities are identified.        Impression:      Impression:    1. Moderate air-fluid distention of the stomach with fluid distention of the lower thoracic esophagus. Mild generalized gas in large and small bowel loops. No evidence of active bowel inflammation or bowel obstruction or focal mass lesion.  2. 12 mm left renal pelvic stone with left renal pelvic and proximal left ureteral inflammatory stranding. This stone could serve as a source of intermittent obstruction and inflammation.  2. Nonobstructing bilateral intrarenal calculi.  3. Patchy airspace disease in the medial right lower lobe may represent atelectasis or developing pneumonia. Scattered atelectatic changes are present elsewhere within the lung bases.  4.. Additional chronic findings as described above.            Electronically Signed: Casie Johnson MD    8/26/2024 2:07 PM EDT    Workstation ID: [id number]    XR Chest 1 View [[id number]] Collected: 08/26/24 1259     Updated: 08/26/24 1301    Narrative:      XR CHEST 1 VW    Date of Exam: 8/26/2024 12:49 PM EDT    Indication: sob    Comparison: None available.    Findings:  No acute airspace disease. Low volume inspiration. Normal heart size. No pleural effusion or pneumothorax. Mild degenerative endplate spurring in the thoracic spine      Impression:      Impression:  No acute chest finding.      Electronically Signed: Casie Johnson MD    8/26/2024 12:59 PM EDT     Workstation ID: [id number]            Inpatient Meds:   Scheduled Meds:ampicillin-sulbactam, 3 g, Intravenous, Q8H  pantoprazole, 40 mg, Intravenous, BID AC       Continuous Infusions:sodium chloride, 50 mL/hr, Last Rate: 50 mL/hr (08/26/24 5617)       PRN Meds:.  HYDROcodone-acetaminophen    [COMPLETED] Insert Peripheral IV **AND** sodium chloride      Assessment & Plan     Principal Problem:    Nausea & vomiting    Large left renal pelvic stone which is not obstructing    Plan  Discussed shockwave lithotripsy and stent placement with the patient.  We will need to arrange for this as an elective outpatient surgery once his pneumonia has been well treated.  No indication for acute intervention at this time.      I discussed the patient's findings and my recommendations with patient    Thank you for this  consult    Samuel Johnson MD  08/27/24  10:05 EDT

## 2024-08-27 NOTE — CONSULTS
GI CONSULT  NOTE:    Referring Provider:  Dr. Kiran    Chief complaint: Nausea/vomiting    Subjective .     History of present illness: Patient is a 64-year-old male with history of CVA with left-sided weakness, CHF, hypertension, GERD, and previous alcohol use.  He presented to the hospital from nursing facility yesterday with cough and vomiting.  He reports waking yesterday morning with severe coughing that led to vomiting.  He had some coffee-ground emesis.  His abdomen is sore and he associates this from all the coughing.  He no longer feels nauseous now.  He does take PPI at home which controls heartburn.  He takes Aleve almost daily for joint pain.      Endo History:  2017 EGD by Dr. Avila -distal esophageal ulcers x 3, Siddiqi's esophagus but not biopsied, hiatal hernia, alcohol gastritis.    Past Medical History:  Past Medical History:   Diagnosis Date    Elevated cholesterol     GERD (gastroesophageal reflux disease)     Stroke        Past Surgical History:  History reviewed. No pertinent surgical history.    Social History:  Social History     Tobacco Use    Smoking status: Never    Smokeless tobacco: Former     Types: Snuff   Vaping Use    Vaping status: Never Used   Substance Use Topics    Alcohol use: Not Currently     Comment: two bottles of liquor once a month    Drug use: No       Family History:  History reviewed. No pertinent family history.    Medications:  Medications Prior to Admission   Medication Sig Dispense Refill Last Dose    aluminum-magnesium hydroxide-simethicone (MAALOX/MYLANTA) 200-200-20 MG/5ML suspension Take 30 mL by mouth Every 8 (Eight) Hours As Needed for Indigestion or Heartburn.       aspirin 81 MG chewable tablet Chew 1 tablet Every Morning.       atorvastatin (LIPITOR) 40 MG tablet Take 1 tablet by mouth Every Night.       baclofen (LIORESAL) 10 MG tablet Take 1 tablet by mouth 3 (Three) Times a Day.       calcium carbonate (TUMS) 500 MG chewable tablet Chew 1 tablet 3  (Three) Times a Day.       carboxymethylcellulose (REFRESH PLUS) 0.5 % solution Administer 2 drops to both eyes 2 (Two) Times a Day.       ezetimibe (ZETIA) 10 MG tablet Take 1 tablet by mouth Every Morning.       famotidine (PEPCID) 20 MG tablet Take 1 tablet by mouth 2 (Two) Times a Day.       gabapentin (NEURONTIN) 400 MG capsule Take 1 capsule by mouth 3 (Three) Times a Day.       HYDROcodone-acetaminophen (NORCO)  MG per tablet Take 1 tablet by mouth 3 (Three) Times a Day.       ibuprofen (ADVIL,MOTRIN) 600 MG tablet Take 1 tablet by mouth Every 8 (Eight) Hours As Needed for Mild Pain.       ketoconazole (NIZORAL) 2 % shampoo Apply 1 Application topically to the appropriate area as directed 1 (One) Time Per Week. Apply to scalp topically on dayshift every Wednesday, Saturday for tinea versicolor for 6 months       Lidocaine 4 % aerosol Place 1 Application on the skin as directed by provider 2 (Two) Times a Day. Apply to R 4th toe distal topically every morning and at bedtime for pain       meclizine (ANTIVERT) 25 MG tablet Take 1 tablet by mouth Every 8 (Eight) Hours As Needed for Dizziness.       melatonin 1 MG tablet Take 3 tablets by mouth Every Night.       ondansetron (ZOFRAN) 4 MG tablet Take 1 tablet by mouth Every 6 (Six) Hours As Needed for Nausea or Vomiting.       Scopolamine 1 MG/3DAYS patch Place 1 patch on the skin as directed by provider Every 72 (Seventy-Two) Hours.       Skin Protectants, Misc. (Eucerin) cream Apply 1 Application topically to the appropriate area as directed 2 (Two) Times a Day. Apply to bilateral arms topically every shift for eczema       tamsulosin (FLOMAX) 0.4 MG capsule 24 hr capsule Take 1 capsule by mouth Every Morning.       white petrolatum ointment Apply 1 Application topically to the appropriate area as directed every night at bedtime. Instill 1 application in both eyes at bedtime for eye maintenance 1/2 inch ribbon outer lid          Scheduled  "Meds:ampicillin-sulbactam, 3 g, Intravenous, Q8H  pantoprazole, 40 mg, Intravenous, BID AC      Continuous Infusions:sodium chloride, 50 mL/hr, Last Rate: 50 mL/hr (08/26/24 9200)      PRN Meds:.  HYDROcodone-acetaminophen    [COMPLETED] Insert Peripheral IV **AND** sodium chloride    ALLERGIES:  Patient has no known allergies.    ROS:  Review of Systems   Constitutional:  Negative for chills and fever.   Respiratory:  Positive for cough and shortness of breath.    Cardiovascular:  Negative for chest pain and palpitations.   Gastrointestinal:  Positive for abdominal pain, nausea and vomiting. Negative for blood in stool.   Musculoskeletal:  Positive for arthralgias and back pain.   Psychiatric/Behavioral:  Negative for agitation and confusion.        Objective     Vital Signs:   Visit Vitals  /69 (BP Location: Left arm, Patient Position: Lying)   Pulse 78   Temp 98.2 °F (36.8 °C) (Oral)   Resp 20   Ht 175.3 cm (69\")   Wt 99.8 kg (220 lb)   SpO2 97%   BMI 32.49 kg/m²       Physical Exam:      General Appearance:    Awake and alert, in no acute distress   Head:    Normocephalic, without obvious abnormality, atraumatic   Eyes:            Conjunctivae normal, anicteric sclera   Ears:    Ears appear intact with no abnormalities noted   Throat:   No oral lesions, no thrush, oral mucosa moist       Lungs:     Respirations regular, even and unlabored       Chest Wall:    No abnormalities observed   Abdomen:     Soft, mild generalized tenderness, no rebound or guarding, non-distended, no hepatosplenomegaly   Rectal:     Deferred   Extremities:   No edema, no cyanosis, no redness   Pulses:   Pulses palpable and equal bilaterally   Skin:   No bleeding, bruising or rash, no jaundice       Neurologic:   Sensation intact       Results Review:   I reviewed the patient's labs and imaging.  CBC  Results from last 7 days   Lab Units 08/26/24  1302   RBC 10*6/mm3 4.68   WBC 10*3/mm3 9.29   HEMOGLOBIN g/dL 13.6   PLATELETS " 10*3/mm3 179       CMP  Results from last 7 days   Lab Units 08/26/24  1302   SODIUM mmol/L 145   POTASSIUM mmol/L 3.6   CHLORIDE mmol/L 104   CO2 mmol/L 30.2*   BUN mg/dL 14   CREATININE mg/dL 1.02   GLUCOSE mg/dL 109*   ALBUMIN g/dL 3.8   BILIRUBIN mg/dL 1.4*   ALK PHOS U/L 116   AST (SGOT) U/L 43*   ALT (SGPT) U/L 25       Amylase and Lipase  Results from last 7 days   Lab Units 08/26/24  1302   LIPASE U/L 13       CRP     Results from last 7 days   Lab Units 08/27/24  0140   CRP mg/dL 8.84*       Imaging Results (Last 24 Hours)       Procedure Component Value Units Date/Time    CT Chest Without Contrast Diagnostic [[id number]] Collected: 08/26/24 2304     Updated: 08/26/24 2307    Narrative:      CT CHEST WO CONTRAST DIAGNOSTIC    Date of Exam: 8/26/2024 8:03 PM EDT    Indication: Possible pneumonia.    Comparison: 8/26/2024.    Technique: Axial CT images were obtained of the chest without contrast administration.  Sagittal and coronal reconstructions were performed.  Automated exposure control and iterative reconstruction methods were used.      Findings:  Hilum and Mediastinum: No pathologically enlarged lymph nodes. The heart appears mildly enlarged. Atherosclerotic calcifications are present including within the coronary arteries. Granulomatous calcifications are present.   No pericardial effusion.    Unremarkable thoracic aorta and pulmonary arteries.    Lung Parenchyma and Pleura: Mild patchy airspace disease is present within the posterior aspect of the lower lobes bilaterally, right greater than left. Mild air bronchograms are present particularly on the right. Probable mild atelectatic changes are   present within the lingula. No significant pleural effusion identified. No focal pulmonary nodule identified.  Venous changes are present..    Upper Abdomen: No acute process.     Soft tissues: Unremarkable.    Osseous structures: No aggressive focal lytic or sclerotic osseous lesions.      Impression:       Impression:  1.Mild patchy airspace disease present within the posterior aspect of the lower lobes bilaterally, right greater than left, likely related to a mild pneumonia and atelectasis..  2.Ancillary findings as described above.        Electronically Signed: Evelyn Barnett MD    8/26/2024 11:05 PM EDT    Workstation ID: [id number]    CT Abdomen Pelvis With Contrast [[id number]] Collected: 08/26/24 1401     Updated: 08/26/24 1409    Narrative:      CT ABDOMEN PELVIS W CONTRAST    Date of Exam: 8/26/2024 1:52 PM EDT    Indication: abd pain, n/v.    Comparison: None available.    Technique: Axial CT images were obtained of the abdomen and pelvis following the uneventful intravenous administration of iodinated contrast. Sagittal and coronal reconstructions were performed.  Automated exposure control and iterative reconstruction   methods were used.        Findings:  Patchy airspace disease in the posterior medial right lower lobe could represent atelectasis or early pneumonia. Mild atelectasis is suggested posteriorly within the left upper lobe, left lower lobe, and within the right middle lobe. Benign calcified   nodule is seen in the left upper lobe. There is mild cardiac enlargement. Coronary artery calcifications are present. No pericardial effusion or pleural effusion is seen.    There is mild fluid distention of the lower thoracic esophagus. There is moderate air-fluid distention of the stomach. The large and small bowel do not appear inflamed or obstructed. There is mild generalized gas-distention of large and small bowel. Mild   to moderate colonic stool burden is present. The appendix appears normal. No adenopathy. No pneumatosis. No free air or free fluid.    1.2 cm left renal pelvic stone is present. There is left renal parapelvic and proximal periureteric inflammatory stranding. No nathalia left hydronephrosis is seen. Small nonobstructing bilateral intrarenal calculi are present, greatest on the left. Small    cyst is seen at the left lower renal pole.    The liver, gallbladder, spleen, pancreas, adrenals are normal. The urinary bladder, prostate, and rectum are normal. There is a left upper thigh superficial edema.    Moderate to severe calcific atherosclerosis is demonstrated within the femoral arteries within each upper thigh.    Advanced degenerative changes within the spine, greatest at L3-4 through L5-S1.. Degenerative changes of the hips, pubic symphysis, sacroiliac joints. No acute osseous abnormalities are identified.        Impression:      Impression:    1. Moderate air-fluid distention of the stomach with fluid distention of the lower thoracic esophagus. Mild generalized gas in large and small bowel loops. No evidence of active bowel inflammation or bowel obstruction or focal mass lesion.  2. 12 mm left renal pelvic stone with left renal pelvic and proximal left ureteral inflammatory stranding. This stone could serve as a source of intermittent obstruction and inflammation.  2. Nonobstructing bilateral intrarenal calculi.  3. Patchy airspace disease in the medial right lower lobe may represent atelectasis or developing pneumonia. Scattered atelectatic changes are present elsewhere within the lung bases.  4.. Additional chronic findings as described above.            Electronically Signed: Casie Johnson MD    8/26/2024 2:07 PM EDT    Workstation ID: [id number]    XR Chest 1 View [[id number]] Collected: 08/26/24 1259     Updated: 08/26/24 1301    Narrative:      XR CHEST 1 VW    Date of Exam: 8/26/2024 12:49 PM EDT    Indication: sob    Comparison: None available.    Findings:  No acute airspace disease. Low volume inspiration. Normal heart size. No pleural effusion or pneumothorax. Mild degenerative endplate spurring in the thoracic spine      Impression:      Impression:  No acute chest finding.      Electronically Signed: Casie Johnson MD    8/26/2024 12:59 PM EDT    Workstation ID: [id number]               ASSESSMENT AND PLAN:  Nausea/vomiting with coffee-ground emesis  Pneumonia possibly due to aspiration  GERD  Abnormal CT suggesting distended and fluid-filled stomach and esophagus  Mildly elevated liver enzymes  Left renal pelvic stone  History of CVA with left-sided weakness  CHF  History of heavy alcohol use    Principal Problem:    Nausea & vomiting     Plan:  64-year-old male with history of CVA and esophageal ulcers with possible Siddiqi's presented 8/26/2024 from nursing facility with cough and nausea/vomiting with coffee-ground emesis.  Diagnosed with pneumonia.    CT shows distended and fluid-filled stomach and esophagus.  He does take Aleve almost daily.  Concern for ulcer.  CBC is unremarkable.  He has been started on IV PPI twice daily.  No further vomiting this morning.  Will proceed with EGD today for further evaluation.  Monitor H&H.  He does have very minimally elevated liver enzymes with total bilirubin 1.4 and AST 43.  Lipase normal.  Continue to monitor liver enzymes.  Check viral hepatitis panel.  Neurology has also been consulted.  He is on antibiotics for pneumonia.  Continue supportive care.    I discussed the patients findings and my recommendations with the patient.  Nora Alvarado, PATRICK  08/27/24  09:50 EDT

## 2024-08-27 NOTE — PROGRESS NOTES
[facility] MEDICINE SERVICE  DAILY PROGRESS NOTE    NAME: Kevin Hurtado  : 1960  MRN: [id number]      LOS: 1 day     PROVIDER OF SERVICE: Stanford Kiran MD    Chief Complaint: Nausea & vomiting    Subjective:     Interval History:  History taken from: patient       This is 64-year-old male with past medical history of CVA and left-sided weakness and dysphagia who is currently on regular diet and thin liquid was sent from nursing facility because of episodes of nausea vomiting and there was concern about aspiration pneumonia.  Patient has some cough and congestion.  CT scan of abdomen pelvis in ER showed distended stomach kidney stone and possible aspiration pneumonia.  Patient is currently being admitted for further management.  Patient is not a good historian and was sleeping    24-no acute distress, vital signs stable, GI was consulted, patient to undergo EGD today.  Discussed with RN         Review of Systems   Constitutional:  Positive for fatigue. Negative for appetite change, chills and fever.   HENT:  Negative for congestion.    Eyes:  Negative for pain and redness.   Respiratory: Negative.  Negative for cough, chest tightness and shortness of breath.    Gastrointestinal:  Positive for nausea and vomiting. Negative for abdominal pain and diarrhea.   Endocrine: Negative for cold intolerance and heat intolerance.   Genitourinary:  Negative for dysuria, flank pain and frequency.   Musculoskeletal:  Negative for back pain and myalgias.   Neurological:  Positive for weakness. Negative for dizziness and headaches.   Psychiatric/Behavioral:  Negative for sleep disturbance. The patient is not nervous/anxious.        Objective:     Vital Signs  Temp:  [97.3 °F (36.3 °C)-98.4 °F (36.9 °C)] 98 °F (36.7 °C)  Heart Rate:  [] 81  Resp:  [16-22] 22  BP: ()/(62-84) 108/67  Flow (L/min):  [2-4] 4   Body mass index is 32.49 kg/m².      Physical Exam    Constitutional: Patient appears  well-developed and well-nourished and in no acute distress   HEENT:   Head: Normocephalic and atraumatic.   Eyes:  Pupils are equal, round, and reactive to light. EOM are intact. Sclera are anicteric and non-injected.  Mouth and Throat: Patient has moist mucous membranes.       Neck: Neck supple.  No thyromegaly present. No lymphadenopathy present. No  masses.      Cardiovascular: Inspection: No JVD present. Palpation:bilaterally. No leg edema. Auscultation: Regular rate, regular rhythm, S1 normal and S2 normal. reveals no gallop and no friction rub. No Carotid bruit bilaterally.     Pulmonary/Chest: Inspection: No distress, no use of accessory muscles. Lungs are clear to auscultation bilaterally. No respiratory distress. No wheezes. No rhonchi. No rales.      Abdomen /Gastrointestinal: Inspection: no distension. Palpation: no masses, no organomegaly. Soft. There is no tenderness. Bowel sounds are normal.   Extremities no cyanosis clubbing or edema     Neurological: Sleeping  Skin: Skin is warm. No rash noted. Nails show no clubbing.  No cyanosis or erythema. No bruising.  Scheduled Meds   ampicillin-sulbactam, 3 g, Intravenous, Q8H  pantoprazole, 40 mg, Intravenous, BID AC       PRN Meds     HYDROcodone-acetaminophen    [COMPLETED] Insert Peripheral IV **AND** sodium chloride   Infusions  sodium chloride, 50 mL/hr, Last Rate: 50 mL/hr (08/26/24 4300)          Diagnostic Data    Results from last 7 days   Lab Units 08/26/24  1302   WBC 10*3/mm3 9.29   HEMOGLOBIN g/dL 13.6   HEMATOCRIT % 43.3   PLATELETS 10*3/mm3 179   GLUCOSE mg/dL 109*   CREATININE mg/dL 1.02   BUN mg/dL 14   SODIUM mmol/L 145   POTASSIUM mmol/L 3.6   AST (SGOT) U/L 43*   ALT (SGPT) U/L 25   ALK PHOS U/L 116   BILIRUBIN mg/dL 1.4*   ANION GAP mmol/L 10.8       CT Chest Without Contrast Diagnostic    Result Date: 8/26/2024  Impression: 1.Mild patchy airspace disease present within the posterior aspect of the lower lobes bilaterally, right greater  than left, likely related to a mild pneumonia and atelectasis.. 2.Ancillary findings as described above. Electronically Signed: Evelyn Barnett MD  8/26/2024 11:05 PM EDT  Workstation ID: [id number]    CT Abdomen Pelvis With Contrast    Result Date: 8/26/2024  Impression: 1. Moderate air-fluid distention of the stomach with fluid distention of the lower thoracic esophagus. Mild generalized gas in large and small bowel loops. No evidence of active bowel inflammation or bowel obstruction or focal mass lesion. 2. 12 mm left renal pelvic stone with left renal pelvic and proximal left ureteral inflammatory stranding. This stone could serve as a source of intermittent obstruction and inflammation. 2. Nonobstructing bilateral intrarenal calculi. 3. Patchy airspace disease in the medial right lower lobe may represent atelectasis or developing pneumonia. Scattered atelectatic changes are present elsewhere within the lung bases. 4.. Additional chronic findings as described above. Electronically Signed: Casie Johnson MD  8/26/2024 2:07 PM EDT  Workstation ID: [id number]    XR Chest 1 View    Result Date: 8/26/2024  Impression: No acute chest finding. Electronically Signed: Casie Johnson MD  8/26/2024 12:59 PM EDT  Workstation ID: [id number]       I reviewed the patient's new clinical results.    Assessment/Plan:     Active and Resolved Problems  Active Hospital Problems    Diagnosis  POA    **Nausea & vomiting [R11.2]  Yes      Resolved Hospital Problems   No resolved problems to display.     Episodes of nausea vomiting question coffee-ground emesis  Will consult GI >  CT shows distended and fluid-filled stomach and esophagus.  He does take Aleve almost daily.  Concern for ulcer.  CBC is unremarkable.  He has been started on IV PPI twice daily.  No further vomiting this morning.  Will proceed with EGD today for further evaluation.    -Monitor H&H.  He does have very minimally elevated liver enzymes with total bilirubin 1.4 and AST  43.  Lipase normal.  Continue to monitor liver enzymes.  Check viral hepatitis panel.  Neurology has also been consulted.  He is on antibiotics for pneumonia.  Continue supportive care.   NPO except sips of water in medication   IV fluid    Possible aspiration pneumonia>Empiric antibiotic in the form of Unasyn  Speech to see this patient   Panculture    Left-sided renal pelvis stone  Urology consulted>Discussed shockwave lithotripsy and stent placement with the patient. We will need to arrange for this as an elective outpatient surgery once his pneumonia has been well treated. No indication for acute intervention at this time   May need to get CT scan of chest    History of CVA with left-sided hemiparesis  Hypertension  History of CHF  History of dysphagia  Hyperlipidemia  Morbid obesity  PT OT      VTE Prophylaxis:  Mechanical VTE prophylaxis orders are signed & held.           Code status is   Code Status and Medical Interventions: CPR (Attempt to Resuscitate); Full Support   Ordered at: 08/26/24 1605     Code Status (Patient has no pulse and is not breathing):    CPR (Attempt to Resuscitate)     Medical Interventions (Patient has pulse or is breathing):    Full Support       Plan for disposition:home in 2 days    Time: 30 minutes    Signature: Electronically signed by Stanford Kiran MD, 08/27/24, 11:46 EDT.  [facility] Hospitalist Team

## 2024-08-27 NOTE — NURSING NOTE
Attempted to see patient for wounds to sacral buttocks area.  Patient is off the floor.  I am unable to see at this time we will follow-up

## 2024-08-27 NOTE — PLAN OF CARE
Goal Outcome Evaluation:         [facility] will follow up as appropriate.

## 2024-08-27 NOTE — OP NOTE
ESOPHAGOGASTRODUODENOSCOPY Procedure Report    Patient Name:  Kevin Hurtado  YOB: 1960    Date of Surgery:  8/27/2024     Pre-Op Diagnosis:  Nausea and vomiting, unspecified vomiting type [R11.2]       Post-Op Diagnosis Codes:     * Nausea and vomiting, unspecified vomiting type [R11.2]    Post Op Diagnosis:  Siddiqi's esophagus  Hiatal hernia      Procedure/CPT® Codes:      Procedure(s):  ESOPHAGOGASTRODUODENOSCOPY with biopsy x 3 areas    Staff:  Surgeon(s):  KG Arizmendi MD      Anesthesia: Monitored Anesthesia Care    Description of Procedure:  A description of the procedure as well as risks, benefits and alternative methods were explained to the patient who voiced understanding and signed the corresponding consent form. A physical exam was performed and vital signs were monitored throughout the procedure.    An upper GI endoscope was placed into the mouth and proceeded through the esophagus, stomach and second portion of the duodenum without difficulty. The scope was then retroflexed and the fundus was visualized. The procedure was not difficult and there were no immediate complications.  There was no blood loss.    Impression:  1.  Siddiqi's esophagus with salmon-colored mucosa extending above the upper extent of the gastric folds, from approximately 34 cm to 29 cm, C5M6.  For quad biopsies obtained every 2 cm with cold forceps for histology and to rule out intestinal metaplasia or dysplasia.  No significant retained fluid in the esophagus.  2.  Medium size sliding hiatal hernia, 5 cm in length.  Diaphragmatic hiatus at 39 cm, upper extent of the gastric folds at 34 cm, Z-line at 29 cm.  3.  Normal-appearing mucosa throughout the stomach.  No ulcers were seen.  No significant retained food or fluid in the stomach.  4.  Normal mucosa of the duodenal bulb and second and third portion of the duodenum.  No significant retained food or fluid in the duodenum.    Recommendations:  -Okay to  resume regular/cardiac diet  -Continue PPI daily  -Overdue for screening colonoscopy, can arrange for this as an outpatient  -Okay for discharge from GI perspective when otherwise medically stable from cardiac and respiratory issues    GI will see inpatient as needed    REINA Arizmendi MD     Date: 8/27/2024    Time: 14:34 EDT

## 2024-08-27 NOTE — PLAN OF CARE
Goal Outcome Evaluation:  ST orders received for swallowing. Chart reviewed and events noted. GI following w/ plans to complete an EGD this date. Will f/u w/ pt tomorrow s/p EGD and attempt evaluation if deemed appropriate.

## 2024-08-27 NOTE — THERAPY EVALUATION
Patient Name: Kevin Hurtado  : 1960    MRN: [id number]                              Today's Date: 2024       Admit Date: 2024    Visit Dx:     ICD-10-CM ICD-9-CM   1. Pneumonia of right lower lobe due to infectious organism  J18.9 486   2. Ileus  K56.7 560.1   3. Ureterolithiasis  N20.1 592.1   4. Vomiting, unspecified vomiting type, unspecified whether nausea present  R11.10 787.03   5. Nausea and vomiting, unspecified vomiting type  R11.2 787.01     Patient Active Problem List   Diagnosis    Elevated cholesterol    GERD (gastroesophageal reflux disease)    Nausea & vomiting     Past Medical History:   Diagnosis Date    Elevated cholesterol     GERD (gastroesophageal reflux disease)     Stroke      History reviewed. No pertinent surgical history.   General Information       Row Name 24 0934          Physical Therapy Time and Intention    Document Type evaluation  -RM     Mode of Treatment physical therapy  -       Row Name 24 0934          General Information    Patient Profile Reviewed yes  -RM     Prior Level of Function --  Pt reports TD with bed mobility/ADLs since stroke in . Use of connie lift for transfers and is nonambulatory. TD with w/c mobility dependent upon staff for transport.  -RM     Existing Precautions/Restrictions oxygen therapy device and L/min;fall  Pressure injury of posterior heels and B gluteal region, NPO, h/o CVA with left-sided hemiparesis-- no active movement of LUE with pt report of L shoulder subluxation from stroke, h/o chronic L hip pain since stroke with LLE fixed in hip ER/tiffany flexion  -RM     Barriers to Rehab medically complex  -       Row Name 24 0934          Living Environment    People in Home --  Pt reports resides at [facility] since stroke in .  -       Row Name 24 0934          Cognition    Orientation Status (Cognition) person;place;situation;other (see comments)  Oriented to month, however thought year was  2023.  -RM       Row Name 08/27/24 0934          Safety Issues, Functional Mobility    Impairments Affecting Function (Mobility) balance;cognition;coordination;endurance/activity tolerance;pain;range of motion (ROM);strength;postural/trunk control  -RM               User Key  (r) = Recorded By, (t) = Taken By, (c) = Cosigned By      Initials Name Provider Type    Kanwal Negro, PT Physical Therapist                   Mobility       Row Name 08/27/24 0942          Bed Mobility    Bed Mobility bed mobility (all) activities  -RM     All Activities, [place] (Bed Mobility) maximum assist (25% patient effort);2 person assist  -RM     Comment, (Bed Mobility) Unable to assist with LUE d/t report of subluxation/ no active movement and LLE d/t being fixed in hip ER/knee flexion; increased time needed/close attention made to LUE/LLE d/t h/o pain with no increase in pain reported after bed mobility performed  -RM               User Key  (r) = Recorded By, (t) = Taken By, (c) = Cosigned By      Initials Name Provider Type     Kanwal Don, PT Physical Therapist                   Obj/Interventions       Row Name 08/27/24 0937          Range of Motion Comprehensive    Comment, General Range of Motion RLE AROM WFL; no active movement of LUE pt reports since stroke in 2023 (pt reports subluxation); LLE fixed in hip ER/ knee flexion, as well as unable to obtain neutral with ankle DF  -RM       Row Name 08/27/24 0946          Strength Comprehensive (MMT)    Comment, General Manual Muscle Testing (MMT) Assessment MMT of RLE grossly 3/5; unable to adequately assess LLE d/t inability to obtain anatomical alignment  -RM       Row Name 08/27/24 0946          Balance    Comment, Balance Max A x 2 for static sitting balance at EOB x 30 sec; unable to obtain erect posture d/t rigidity  -RM       Row Name 08/27/24 0946          Sensory Assessment (Somatosensory)    Sensory Assessment (Somatosensory) other (see comments)  Sensation  deficits of LLE--greater deficits at distal LE/ foot reporting is unable to feel light touch  -               User Key  (r) = Recorded By, (t) = Taken By, (c) = Cosigned By      Initials Name Provider Type    RM Kanwal Don, PT Physical Therapist                   Goals/Plan    No documentation.                  Clinical Impression       Row Name 08/27/24 0956          Pain    Pretreatment Pain Rating 5/10  -RM     Posttreatment Pain Rating 5/10  -RM     Pre/Posttreatment Pain Comment L shoulder, L hip--no increase in pain reported after PT session  -       Row Name 08/27/24 0956          Plan of Care Review    Plan of Care Reviewed With patient  -RM     Outcome Evaluation 65 y/o M with h/o CVA with left sided weakness and dysphagia, HTN, CHF presented to hospital from nursing home where resides d/t n/v with concern for aspiration PNA. CT scan of abdomen pelvis in ER showed distended stomach kidney stone and possible aspiration pneumonia. EGD to be completed this date. At baseline, pt was living at [facility] TD with functional mobility/ADLs requiring use of connie lift for transfers and dependent upon staff for transport via wheelchair. This date, pt requires Max A x 2 for bed mobility with inability to obtain anatomical alignment with static sitting balance at EOB d/t rigidity unsafe to attempt OOB activity at this time. LLE fixed in hip ER/ knee flexion with no AROM present at ankle, as well as no AROM present of LUE. Pt reports has been off therapy caseload for a while at [facility] facility. Pt appears to be at functional baseline at this time with no need for continued skilled PT services during hospital stay. PT signing off at this time. PT d/c recommendation of returning to [facility] with 24 hr care from nursing staff.  -       Row Name 08/27/24 0956          Therapy Assessment/Plan (PT)    Criteria for Skilled Interventions Met (PT) no;does not meet criteria for skilled  intervention  -RM     Therapy Frequency (PT) evaluation only  -RM       Row Name 08/27/24 0956          Positioning and Restraints    Pre-Treatment Position in bed  -RM     Post Treatment Position bed  -RM     In Bed notified nsg;call light within reach;encouraged to call for assist;exit alarm on  -RM               User Key  (r) = Recorded By, (t) = Taken By, (c) = Cosigned By      Initials Name Provider Type     Kanwal Don, ZEINA Physical Therapist                   Outcome Measures       Row Name 08/27/24 1004 08/27/24 0800       How much help from another person do you currently need...    Turning from your back to your side while in flat bed without using bedrails? 1  -RM 1  -CM    Moving from lying on back to sitting on the side of a flat bed without bedrails? 1  -RM 1  -CM    Moving to and from a bed to a chair (including a wheelchair)? 1  -RM 1  -CM    Standing up from a chair using your arms (e.g., wheelchair, bedside chair)? 1  -RM 1  -CM    Climbing 3-5 steps with a railing? 1  -RM 1  -CM    To walk in hospital room? 1  -RM 1  -CM    AM-PAC 6 Clicks Score (PT) 6  -RM 6  -CM    Highest Level of Mobility Goal 2 --> Bed activities/dependent transfer  -RM 2 --> Bed activities/dependent transfer  -CM              User Key  (r) = Recorded By, (t) = Taken By, (c) = Cosigned By      Initials Name Provider Type    CM Verna Gupta, RN Registered Nurse     Kanwal Don PT Physical Therapist                                 Physical Therapy Education       Title: PT OT SLP Therapies (Done)       Topic: Physical Therapy (Done)       Point: Mobility training (Done)       Learning Progress Summary             Patient Acceptance, E, VU by  at 8/27/2024 1005                                         User Key       Initials Effective Dates Name Provider Type Discipline     03/27/23 -  Kanwal Don PT Physical Therapist PT                  PT Recommendation and Plan     Plan of Care Reviewed With:  patient  Outcome Evaluation: 65 y/o M with h/o CVA with left sided weakness and dysphagia, HTN, CHF presented to hospital from nursing home where resides d/t n/v with concern for aspiration PNA. CT scan of abdomen pelvis in ER showed distended stomach kidney stone and possible aspiration pneumonia. EGD to be completed this date. At baseline, pt was living at [facility] TD with functional mobility/ADLs requiring use of connie lift for transfers and dependent upon staff for transport via wheelchair. This date, pt requires Max A x 2 for bed mobility with inability to obtain anatomical alignment with static sitting balance at EOB d/t rigidity unsafe to attempt OOB activity at this time. LLE fixed in hip ER/ knee flexion with no AROM present at ankle, as well as no AROM present of LUE. Pt reports has been off therapy caseload for a while at [facility]. Pt appears to be at functional baseline at this time with no need for continued skilled PT services during hospital stay. PT signing off at this time. PT d/c recommendation of returning to [facility] with 24 hr care from nursing staff.     Time Calculation:         PT Charges       Row Name 08/27/24 1005             Time Calculation    Start Time 0852  -RM      Stop Time 0923  -RM      Time Calculation (min) 31 min  -RM      PT Received On 08/27/24  -RM         Time Calculation- PT    Total Timed Code Minutes- PT 0 minute(s)  -RM                User Key  (r) = Recorded By, (t) = Taken By, (c) = Cosigned By      Initials Name Provider Type    Kanwal Negro, ZEINA Physical Therapist                  Therapy Charges for Today       Code Description Service Date Service Provider Modifiers Qty    91340168923 HC PT EVAL HIGH COMPLEXITY 4 8/27/2024 Kanwal Don, PT GP 1            PT G-Codes  AM-PAC 6 Clicks Score (PT): 6  PT Discharge Summary  Anticipated Discharge Disposition (PT): extended care facility    Kanwal Don PT  8/27/2024

## 2024-08-27 NOTE — PLAN OF CARE
Goal Outcome Evaluation:  Plan of Care Reviewed With: patient     Problem: Adult Inpatient Plan of Care  Goal: Plan of Care Review  Outcome: Ongoing, Progressing  Flowsheets (Taken 8/27/2024 6891)  Progress: no change  Plan of Care Reviewed With: patient        Progress: no change

## 2024-08-27 NOTE — ANESTHESIA PREPROCEDURE EVALUATION
Anesthesia Evaluation     Patient summary reviewed and Nursing notes reviewed   no history of anesthetic complications:   NPO Solid Status: > 8 hours  NPO Liquid Status: > 8 hours           Airway   Mallampati: II  TM distance: >3 FB  Neck ROM: full  Dental    (+) poor dentition    Pulmonary - normal exam   (-) not a smoker  Cardiovascular - normal exam  Exercise tolerance: poor (<4 METS)    ECG reviewed    (+) CHF Systolic <55%, hyperlipidemia      Neuro/Psych  (+) CVA residual symptoms  GI/Hepatic/Renal/Endo    (+) GERD    Musculoskeletal     Abdominal    Substance History - negative use     OB/GYN          Other                      Anesthesia Plan    ASA 3     general   total IV anesthesia  intravenous induction     Anesthetic plan, risks, benefits, and alternatives have been provided, discussed and informed consent has been obtained with: patient.    Plan discussed with CRNA.    CODE STATUS:    Code Status (Patient has no pulse and is not breathing): CPR (Attempt to Resuscitate)  Medical Interventions (Patient has pulse or is breathing): Full Support

## 2024-08-27 NOTE — PLAN OF CARE
Goal Outcome Evaluation:  Plan of Care Reviewed With: patient           Outcome Evaluation: 63 y/o M with h/o CVA with left sided weakness and dysphagia, HTN, CHF presented to hospital from nursing home where resides d/t n/v with concern for aspiration PNA. CT scan of abdomen pelvis in ER showed distended stomach kidney stone and possible aspiration pneumonia. EGD to be completed this date. At baseline, pt was living at [facility] TD with functional mobility/ADLs requiring use of connie lift for transfers and dependent upon staff for transport via wheelchair. This date, pt requires Max A x 2 for bed mobility with inability to obtain anatomical alignment with static sitting balance at EOB d/t rigidity unsafe to attempt OOB activity at this time. LLE fixed in hip ER/ knee flexion with no AROM present at ankle, as well as no AROM present of LUE. Pt reports has been off therapy caseload for a while at [facility] facility. Pt appears to be at functional baseline at this time with no need for continued skilled PT services during hospital stay. PT signing off at this time. PT d/c recommendation of returning to [facility] [place] with 24 hr care from nursing staff.      Anticipated Discharge Disposition (PT): extended care facility

## 2024-08-28 VITALS
HEIGHT: 69 IN | WEIGHT: 206.79 LBS | SYSTOLIC BLOOD PRESSURE: 103 MMHG | TEMPERATURE: 98.2 F | OXYGEN SATURATION: 98 % | RESPIRATION RATE: 16 BRPM | HEART RATE: 84 BPM | DIASTOLIC BLOOD PRESSURE: 60 MMHG | BODY MASS INDEX: 30.63 KG/M2

## 2024-08-28 LAB
ALBUMIN SERPL-MCNC: 3 G/DL (ref 3.5–5.2)
ALBUMIN/GLOB SERPL: 1.2 G/DL
ALP SERPL-CCNC: 81 U/L (ref 39–117)
ALT SERPL W P-5'-P-CCNC: 22 U/L (ref 1–41)
ANION GAP SERPL CALCULATED.3IONS-SCNC: 8.2 MMOL/L (ref 5–15)
AST SERPL-CCNC: 56 U/L (ref 1–40)
BASOPHILS # BLD AUTO: 0.03 10*3/MM3 (ref 0–0.2)
BASOPHILS NFR BLD AUTO: 0.5 % (ref 0–1.5)
BILIRUB SERPL-MCNC: 1.9 MG/DL (ref 0–1.2)
BUN SERPL-MCNC: 16 MG/DL (ref 8–23)
BUN/CREAT SERPL: 20 (ref 7–25)
CALCIUM SPEC-SCNC: 7.6 MG/DL (ref 8.6–10.5)
CHLORIDE SERPL-SCNC: 104 MMOL/L (ref 98–107)
CO2 SERPL-SCNC: 26.8 MMOL/L (ref 22–29)
CREAT SERPL-MCNC: 0.8 MG/DL (ref 0.76–1.27)
DEPRECATED RDW RBC AUTO: 50.4 FL (ref 37–54)
EGFRCR SERPLBLD CKD-EPI 2021: 99.4 ML/MIN/1.73
EOSINOPHIL # BLD AUTO: 0.18 10*3/MM3 (ref 0–0.4)
EOSINOPHIL NFR BLD AUTO: 3.3 % (ref 0.3–6.2)
ERYTHROCYTE [DISTWIDTH] IN BLOOD BY AUTOMATED COUNT: 15.1 % (ref 12.3–15.4)
GLOBULIN UR ELPH-MCNC: 2.6 GM/DL
GLUCOSE SERPL-MCNC: 80 MG/DL (ref 65–99)
HCT VFR BLD AUTO: 33 % (ref 37.5–51)
HGB BLD-MCNC: 10.4 G/DL (ref 13–17.7)
IMM GRANULOCYTES # BLD AUTO: 0.03 10*3/MM3 (ref 0–0.05)
IMM GRANULOCYTES NFR BLD AUTO: 0.5 % (ref 0–0.5)
LYMPHOCYTES # BLD AUTO: 0.46 10*3/MM3 (ref 0.7–3.1)
LYMPHOCYTES NFR BLD AUTO: 8.3 % (ref 19.6–45.3)
MCH RBC QN AUTO: 28.9 PG (ref 26.6–33)
MCHC RBC AUTO-ENTMCNC: 31.5 G/DL (ref 31.5–35.7)
MCV RBC AUTO: 91.7 FL (ref 79–97)
MONOCYTES # BLD AUTO: 0.34 10*3/MM3 (ref 0.1–0.9)
MONOCYTES NFR BLD AUTO: 6.2 % (ref 5–12)
NEUTROPHILS NFR BLD AUTO: 4.47 10*3/MM3 (ref 1.7–7)
NEUTROPHILS NFR BLD AUTO: 81.2 % (ref 42.7–76)
NRBC BLD AUTO-RTO: 0 /100 WBC (ref 0–0.2)
PLATELET # BLD AUTO: 136 10*3/MM3 (ref 140–450)
PMV BLD AUTO: 10.3 FL (ref 6–12)
POTASSIUM SERPL-SCNC: 3.2 MMOL/L (ref 3.5–5.2)
PROT SERPL-MCNC: 5.6 G/DL (ref 6–8.5)
RBC # BLD AUTO: 3.6 10*6/MM3 (ref 4.14–5.8)
SODIUM SERPL-SCNC: 139 MMOL/L (ref 136–145)
WBC NRBC COR # BLD AUTO: 5.51 10*3/MM3 (ref 3.4–10.8)

## 2024-08-28 PROCEDURE — 25010000002 AMPICILLIN-SULBACTAM PER 1.5 G: Performed by: INTERNAL MEDICINE

## 2024-08-28 PROCEDURE — 85025 COMPLETE CBC W/AUTO DIFF WBC: CPT | Performed by: INTERNAL MEDICINE

## 2024-08-28 PROCEDURE — 80053 COMPREHEN METABOLIC PANEL: CPT | Performed by: INTERNAL MEDICINE

## 2024-08-28 RX ORDER — PANTOPRAZOLE SODIUM 40 MG/1
40 TABLET, DELAYED RELEASE ORAL 2 TIMES DAILY
Qty: 60 TABLET | Refills: 0 | Status: SHIPPED | OUTPATIENT
Start: 2024-08-28

## 2024-08-28 RX ORDER — POTASSIUM CHLORIDE 1500 MG/1
40 TABLET, EXTENDED RELEASE ORAL EVERY 4 HOURS
Status: DISCONTINUED | OUTPATIENT
Start: 2024-08-28 | End: 2024-08-28 | Stop reason: HOSPADM

## 2024-08-28 RX ORDER — HYDROCODONE BITARTRATE AND ACETAMINOPHEN 10; 325 MG/1; MG/1
1 TABLET ORAL 3 TIMES DAILY
Qty: 6 TABLET | Refills: 0 | Status: SHIPPED | OUTPATIENT
Start: 2024-08-28

## 2024-08-28 RX ORDER — BENZONATATE 100 MG/1
100 CAPSULE ORAL ONCE
Status: COMPLETED | OUTPATIENT
Start: 2024-08-28 | End: 2024-08-28

## 2024-08-28 RX ADMIN — BENZONATATE 100 MG: 100 CAPSULE ORAL at 02:34

## 2024-08-28 RX ADMIN — AMPICILLIN SODIUM AND SULBACTAM SODIUM 3 G: 2; 1 INJECTION, POWDER, FOR SOLUTION INTRAMUSCULAR; INTRAVENOUS at 09:00

## 2024-08-28 RX ADMIN — AMPICILLIN SODIUM AND SULBACTAM SODIUM 3 G: 2; 1 INJECTION, POWDER, FOR SOLUTION INTRAMUSCULAR; INTRAVENOUS at 02:02

## 2024-08-28 RX ADMIN — POTASSIUM CHLORIDE 40 MEQ: 1500 TABLET, EXTENDED RELEASE ORAL at 10:19

## 2024-08-28 RX ADMIN — HYDROCODONE BITARTRATE AND ACETAMINOPHEN 1 TABLET: 5; 325 TABLET ORAL at 06:21

## 2024-08-28 RX ADMIN — PANTOPRAZOLE SODIUM 40 MG: 40 INJECTION, POWDER, FOR SOLUTION INTRAVENOUS at 06:46

## 2024-08-28 NOTE — PROGRESS NOTES
Urology Progress Note    Patient Identification:  Name:  Kevin Hurtado  Age:  64 y.o.  Sex:  male  :  1960  MRN:  [id number]    Chief Complaint:  Patient feels better    History of Present Illness: No renal colic symptoms.  Overall patient is feeling well.    Problem List:    Nausea & vomiting     Past Medical History:  Past Medical History:   Diagnosis Date    Elevated cholesterol     GERD (gastroesophageal reflux disease)     Stroke      Past Surgical History:  History reviewed. No pertinent surgical history.  Home Meds:  Medications Prior to Admission   Medication Sig Dispense Refill Last Dose    aluminum-magnesium hydroxide-simethicone (MAALOX/MYLANTA) 200-200-20 MG/5ML suspension Take 30 mL by mouth Every 8 (Eight) Hours As Needed for Indigestion or Heartburn.       aspirin 81 MG chewable tablet Chew 1 tablet Every Morning.       atorvastatin (LIPITOR) 40 MG tablet Take 1 tablet by mouth Every Night.       baclofen (LIORESAL) 10 MG tablet Take 1 tablet by mouth 3 (Three) Times a Day.       calcium carbonate (TUMS) 500 MG chewable tablet Chew 1 tablet 3 (Three) Times a Day.       carboxymethylcellulose (REFRESH PLUS) 0.5 % solution Administer 2 drops to both eyes 2 (Two) Times a Day.       ezetimibe (ZETIA) 10 MG tablet Take 1 tablet by mouth Every Morning.       famotidine (PEPCID) 20 MG tablet Take 1 tablet by mouth 2 (Two) Times a Day.       gabapentin (NEURONTIN) 400 MG capsule Take 1 capsule by mouth 3 (Three) Times a Day.       HYDROcodone-acetaminophen (NORCO)  MG per tablet Take 1 tablet by mouth 3 (Three) Times a Day.       ibuprofen (ADVIL,MOTRIN) 600 MG tablet Take 1 tablet by mouth Every 8 (Eight) Hours As Needed for Mild Pain.       ketoconazole (NIZORAL) 2 % shampoo Apply 1 Application topically to the appropriate area as directed 1 (One) Time Per Week. Apply to scalp topically on dayshift every Wednesday, Saturday for tinea versicolor for 6 months       Lidocaine 4 % aerosol Place  1 Application on the skin as directed by provider 2 (Two) Times a Day. Apply to R 4th toe distal topically every morning and at bedtime for pain       meclizine (ANTIVERT) 25 MG tablet Take 1 tablet by mouth Every 8 (Eight) Hours As Needed for Dizziness.       melatonin 1 MG tablet Take 3 tablets by mouth Every Night.       ondansetron (ZOFRAN) 4 MG tablet Take 1 tablet by mouth Every 6 (Six) Hours As Needed for Nausea or Vomiting.       Scopolamine 1 MG/3DAYS patch Place 1 patch on the skin as directed by provider Every 72 (Seventy-Two) Hours.       Skin Protectants, Misc. (Eucerin) cream Apply 1 Application topically to the appropriate area as directed 2 (Two) Times a Day. Apply to bilateral arms topically every shift for eczema       tamsulosin (FLOMAX) 0.4 MG capsule 24 hr capsule Take 1 capsule by mouth Every Morning.       white petrolatum ointment Apply 1 Application topically to the appropriate area as directed every night at bedtime. Instill 1 application in both eyes at bedtime for eye maintenance 1/2 inch ribbon outer lid        Current Meds:    Current Facility-Administered Medications:     ampicillin-sulbactam (UNASYN) 3 g in sodium chloride 0.9 % 100 mL MBP, 3 g, Intravenous, Q8H, KG Arizmendi MD, Last Rate: 0 mL/hr at 08/26/24 2030, 3 g at 08/28/24 0202    atropine sulfate injection 0.5 mg, 0.5 mg, Intravenous, Once PRN, KG Arizmendi MD    diphenhydrAMINE (BENADRYL) injection 12.5 mg, 12.5 mg, Intravenous, Q15 Min PRN, KG Arizmendi MD    ePHEDrine Sulfate (Pressors) 5 MG/ML injection 5 mg, 5 mg, Intravenous, Once PRN, KG Arizmendi MD    hydrALAZINE (APRESOLINE) injection 5 mg, 5 mg, Intravenous, Q10 Min PRN, KG Arizmendi MD    HYDROcodone-acetaminophen (NORCO) 5-325 MG per tablet 1 tablet, 1 tablet, Oral, Q6H PRN, KG Arizmendi MD, 1 tablet at 08/28/24 0621    ipratropium-albuterol (DUO-NEB) nebulizer solution 3 mL, 3 mL, Nebulization, Once PRN, KG Arizmendi MD     "labetalol (NORMODYNE,TRANDATE) injection 5 mg, 5 mg, Intravenous, Q5 Min PRN, KG Arizmendi MD    lidocaine (cardiac) (XYLOCAINE) injection 100 mg, 100 mg, Intravenous, Q5 Min PRN, KG Arizmendi MD    ondansetron (ZOFRAN) injection 4 mg, 4 mg, Intravenous, Once PRN, KG Arizmendi MD    pantoprazole (PROTONIX) injection 40 mg, 40 mg, Intravenous, BID AC, KG Arizmendi MD, 40 mg at 24 0646    polyethylene glycol (MIRALAX) packet 17 g, 17 g, Oral, Daily, KG Arizmendi MD, 17 g at 24 1743    [COMPLETED] Insert Peripheral IV, , , Once **AND** sodium chloride 0.9 % flush 10 mL, 10 mL, Intravenous, PRN, KG Arizmendi MD, 10 mL at 24 0758    sodium chloride 0.9 % infusion, 50 mL/hr, Intravenous, Continuous, KG Arizmendi MD, Last Rate: 50 mL/hr at 24, 50 mL/hr at 24  Allergies:  Patient has no known allergies.    Review of Systems     Objective:  tMax 24 hours:  Temp (24hrs), Av.3 °F (36.8 °C), Min:97.9 °F (36.6 °C), Max:99.3 °F (37.4 °C)    Vital Sign Ranges:  Temp:  [97.9 °F (36.6 °C)-99.3 °F (37.4 °C)] 99.1 °F (37.3 °C)  Heart Rate:  [73-93] 84  Resp:  [11-22] 16  BP: ()/(44-69) 112/62  Intake and Output Last 3 Shifts:  I/O last 3 completed shifts:  In: 500 [I.V.:300; IV Piggyback:200]  Out: 350 [Urine:350]    Exam:  /62 (BP Location: Right arm, Patient Position: Lying)   Pulse 84   Temp 99.1 °F (37.3 °C) (Oral)   Resp 16   Ht 175.3 cm (69\")   Wt 93.8 kg (206 lb 12.7 oz)   SpO2 99%   BMI 30.54 kg/m²    General Appearance:    Alert, cooperative, no acute distress, general         appearance is normal   Head:    Normocephalic, without obvious abnormality, atraumatic   Eyes:            Pupils/Irises normal. Exterior inspection conjunctivae       and lids normal.   Ears:    Normal external inspection   Nose:   Exterior inspection of nose is normal   Throat:   Lips, mucosa, and tongue normal   Lungs:     Respirations unlabored; normal " effort, no audible     abnormality   CV:   Regular rhythm and normal rate, no edema   Abdomen:     examination of the abdomen is normal with     no masses, tenderness, or distension    :        Data Review:  All labs (24hrs):    Lab Results (last 24 hours)       Procedure Component Value Units Date/Time    Comprehensive Metabolic Panel [[id number]]  (Abnormal) Collected: 08/28/24 0359    Specimen: Blood Updated: 08/28/24 0501     Glucose 80 mg/dL      BUN 16 mg/dL      Creatinine 0.80 mg/dL      Sodium 139 mmol/L      Potassium 3.2 mmol/L      Chloride 104 mmol/L      CO2 26.8 mmol/L      Calcium 7.6 mg/dL      Total Protein 5.6 g/dL      Albumin 3.0 g/dL      ALT (SGPT) 22 U/L      AST (SGOT) 56 U/L      Alkaline Phosphatase 81 U/L      Total Bilirubin 1.9 mg/dL      Globulin 2.6 gm/dL      A/G Ratio 1.2 g/dL      BUN/Creatinine Ratio 20.0     Anion Gap 8.2 mmol/L      eGFR 99.4 mL/min/1.73     Narrative:      GFR Normal >60  Chronic Kidney Disease <60  Kidney Failure <15      CBC & Differential [[id number]]  (Abnormal) Collected: 08/28/24 0359    Specimen: Blood Updated: 08/28/24 0433    Narrative:      The following orders were created for panel order CBC & Differential.  Procedure                               Abnormality         Status                     ---------                               -----------         ------                     CBC Auto Differential[[id number]]        Abnormal            Final result               Scan Slide[[id number]]                                                                    Please view results for these tests on the individual orders.    CBC Auto Differential [[id number]]  (Abnormal) Collected: 08/28/24 0359    Specimen: Blood Updated: 08/28/24 0433     WBC 5.51 10*3/mm3      RBC 3.60 10*6/mm3      Hemoglobin 10.4 g/dL      Comment: Result checked          Hematocrit 33.0 %      Comment: Result checked            MCV 91.7 fL      MCH 28.9 pg      MCHC 31.5 g/dL      RDW  15.1 %      RDW-SD 50.4 fl      MPV 10.3 fL      Platelets 136 10*3/mm3      Neutrophil % 81.2 %      Lymphocyte % 8.3 %      Monocyte % 6.2 %      Eosinophil % 3.3 %      Basophil % 0.5 %      Immature Grans % 0.5 %      Neutrophils, Absolute 4.47 10*3/mm3      Lymphocytes, Absolute 0.46 10*3/mm3      Monocytes, Absolute 0.34 10*3/mm3      Eosinophils, Absolute 0.18 10*3/mm3      Basophils, Absolute 0.03 10*3/mm3      Immature Grans, Absolute 0.03 10*3/mm3      nRBC 0.0 /100 WBC     Blood Culture - Blood, Arm, Right [[id number]]  (Normal) Collected: 08/26/24 1302    Specimen: Blood from Arm, Right Updated: 08/27/24 1601     Blood Culture No growth at 24 hours    Blood Culture - Blood, Arm, Right [[id number]]  (Normal) Collected: 08/26/24 1547    Specimen: Blood from Arm, Right Updated: 08/27/24 1601     Blood Culture No growth at 24 hours    Hepatitis Panel, Acute [[id number]]  (Normal) Collected: 08/26/24 1302    Specimen: Blood Updated: 08/27/24 1024     Hepatitis B Surface Ag Non-Reactive     Hep A IgM Non-Reactive     Hep B C IgM Non-Reactive     Hepatitis C Ab Non-Reactive    Narrative:      Results may be falsely decreased if patient taking Biotin.           Radiology:   Imaging Results (Last 72 Hours)       Procedure Component Value Units Date/Time    CT Chest Without Contrast Diagnostic [[id number]] Collected: 08/26/24 2304     Updated: 08/26/24 2307    Narrative:      CT CHEST WO CONTRAST DIAGNOSTIC    Date of Exam: 8/26/2024 8:03 PM EDT    Indication: Possible pneumonia.    Comparison: 8/26/2024.    Technique: Axial CT images were obtained of the chest without contrast administration.  Sagittal and coronal reconstructions were performed.  Automated exposure control and iterative reconstruction methods were used.      Findings:  Hilum and Mediastinum: No pathologically enlarged lymph nodes. The heart appears mildly enlarged. Atherosclerotic calcifications are present including within the coronary arteries.  Granulomatous calcifications are present.   No pericardial effusion.    Unremarkable thoracic aorta and pulmonary arteries.    Lung Parenchyma and Pleura: Mild patchy airspace disease is present within the posterior aspect of the lower lobes bilaterally, right greater than left. Mild air bronchograms are present particularly on the right. Probable mild atelectatic changes are   present within the lingula. No significant pleural effusion identified. No focal pulmonary nodule identified.  Venous changes are present..    Upper Abdomen: No acute process.     Soft tissues: Unremarkable.    Osseous structures: No aggressive focal lytic or sclerotic osseous lesions.      Impression:      Impression:  1.Mild patchy airspace disease present within the posterior aspect of the lower lobes bilaterally, right greater than left, likely related to a mild pneumonia and atelectasis..  2.Ancillary findings as described above.        Electronically Signed: Evelyn Barnett MD    8/26/2024 11:05 PM EDT    Workstation ID: [id number]    CT Abdomen Pelvis With Contrast [[id number]] Collected: 08/26/24 1401     Updated: 08/26/24 1409    Narrative:      CT ABDOMEN PELVIS W CONTRAST    Date of Exam: 8/26/2024 1:52 PM EDT    Indication: abd pain, n/v.    Comparison: None available.    Technique: Axial CT images were obtained of the abdomen and pelvis following the uneventful intravenous administration of iodinated contrast. Sagittal and coronal reconstructions were performed.  Automated exposure control and iterative reconstruction   methods were used.        Findings:  Patchy airspace disease in the posterior medial right lower lobe could represent atelectasis or early pneumonia. Mild atelectasis is suggested posteriorly within the left upper lobe, left lower lobe, and within the right middle lobe. Benign calcified   nodule is seen in the left upper lobe. There is mild cardiac enlargement. Coronary artery calcifications are present. No pericardial  effusion or pleural effusion is seen.    There is mild fluid distention of the lower thoracic esophagus. There is moderate air-fluid distention of the stomach. The large and small bowel do not appear inflamed or obstructed. There is mild generalized gas-distention of large and small bowel. Mild   to moderate colonic stool burden is present. The appendix appears normal. No adenopathy. No pneumatosis. No free air or free fluid.    1.2 cm left renal pelvic stone is present. There is left renal parapelvic and proximal periureteric inflammatory stranding. No nathalia left hydronephrosis is seen. Small nonobstructing bilateral intrarenal calculi are present, greatest on the left. Small   cyst is seen at the left lower renal pole.    The liver, gallbladder, spleen, pancreas, adrenals are normal. The urinary bladder, prostate, and rectum are normal. There is a left upper thigh superficial edema.    Moderate to severe calcific atherosclerosis is demonstrated within the femoral arteries within each upper thigh.    Advanced degenerative changes within the spine, greatest at L3-4 through L5-S1.. Degenerative changes of the hips, pubic symphysis, sacroiliac joints. No acute osseous abnormalities are identified.        Impression:      Impression:    1. Moderate air-fluid distention of the stomach with fluid distention of the lower thoracic esophagus. Mild generalized gas in large and small bowel loops. No evidence of active bowel inflammation or bowel obstruction or focal mass lesion.  2. 12 mm left renal pelvic stone with left renal pelvic and proximal left ureteral inflammatory stranding. This stone could serve as a source of intermittent obstruction and inflammation.  2. Nonobstructing bilateral intrarenal calculi.  3. Patchy airspace disease in the medial right lower lobe may represent atelectasis or developing pneumonia. Scattered atelectatic changes are present elsewhere within the lung bases.  4.. Additional chronic findings  as described above.            Electronically Signed: Casie Johnson MD    8/26/2024 2:07 PM EDT    Workstation ID: [id number]    XR Chest 1 View [[id number]] Collected: 08/26/24 1259     Updated: 08/26/24 1301    Narrative:      XR CHEST 1 VW    Date of Exam: 8/26/2024 12:49 PM EDT    Indication: sob    Comparison: None available.    Findings:  No acute airspace disease. Low volume inspiration. Normal heart size. No pleural effusion or pneumothorax. Mild degenerative endplate spurring in the thoracic spine      Impression:      Impression:  No acute chest finding.      Electronically Signed: Casie Johnson MD    8/26/2024 12:59 PM EDT    Workstation ID: [id number]            Assessment/Plan:    Principal Problem:    Nausea & vomiting    Large left renal pelvic stone which is not obstructing is asymptomatic    Plan  Will arrange for outpatient elective surgery of the stone with shockwave lithotripsy and stent placement.  We will arrange for this in a few weeks once his pneumonia has been well treated  Nothing further to add at this time so I will sign off  Please let me know if I can be of further assistance      Samuel Johnson MD  8/28/2024  07:08 EDT

## 2024-08-28 NOTE — CONSULTS
"Nutrition Services    Patient Name: Kevin Hurtado  YOB: 1960  MRN: [id number]  Admission date: 8/26/2024    Comment:  -- Continue current diet and encourage good PO intakes      CLINICAL NUTRITION ASSESSMENT      Reason for Assessment 8/28: Wound     H&P      Past Medical History:   Diagnosis Date    Elevated cholesterol     GERD (gastroesophageal reflux disease)     Stroke        History reviewed. No pertinent surgical history.     Current Problems   Episodes of nausea vomiting question coffee-ground emesis  - GI following  - S/P EGD 8/27    Possible aspiration pneumonia    Left-sided renal pelvis stone  - urology following    History of CVA with left-sided hemiparesis    Hypertension    History of CHF    History of dysphagia    Hyperlipidemia       Encounter Information        Trending Narrative     8/28: Admitted for N/V.  RD visited patient at bedside.  Patient reports good PO intakes, some issues chewing related to dentition, stable weight of 220#.  NFPE completed and not consistent with nutrition diagnosis of malnutrition at this time using AND/ASPEN criteria.         Anthropometrics        Current Height, Weight Height: 175.3 cm (69\")  Weight: 93.8 kg (206 lb 12.7 oz) (08/28/24 0307)       Usual Body Weight (UBW) 220# per patient        Trending Weight Hx     This admission: 8/28: 206#             PTA: Stable x 5 years     Wt Readings from Last 30 Encounters:   08/28/24 0307 93.8 kg (206 lb 12.7 oz)   08/26/24 1201 99.8 kg (220 lb)   11/07/19 0325 101 kg (222 lb 3.6 oz)   11/06/19 0315 101 kg (221 lb 12.5 oz)   11/05/19 0330 107 kg (235 lb 10.8 oz)   11/04/19 0300 105 kg (231 lb 4.2 oz)   11/03/19 1953 102 kg (225 lb)      BMI kg/m2 Body mass index is 30.54 kg/m².       Labs        Pertinent Labs    Results from last 7 days   Lab Units 08/28/24  0359 08/26/24  1302   SODIUM mmol/L 139 145   POTASSIUM mmol/L 3.2* 3.6   CHLORIDE mmol/L 104 104   CO2 mmol/L 26.8 30.2*   BUN mg/dL 16 14 " "  CREATININE mg/dL 0.80 1.02   CALCIUM mg/dL 7.6* 8.8   BILIRUBIN mg/dL 1.9* 1.4*   ALK PHOS U/L 81 116   ALT (SGPT) U/L 22 25   AST (SGOT) U/L 56* 43*   GLUCOSE mg/dL 80 109*     Results from last 7 days   Lab Units 08/28/24  0359 08/26/24  1302   MAGNESIUM mg/dL  --  1.6   HEMOGLOBIN g/dL 10.4* 13.6   HEMATOCRIT % 33.0* 43.3     No results found for: \"HGBA1C\"     Medications    Scheduled Medications ampicillin-sulbactam, 3 g, Intravenous, Q8H  pantoprazole, 40 mg, Intravenous, BID AC  polyethylene glycol, 17 g, Oral, Daily  potassium chloride ER, 40 mEq, Oral, Q4H        Infusions sodium chloride, 50 mL/hr, Last Rate: 50 mL/hr (08/27/24 1925)        PRN Medications   atropine    diphenhydrAMINE    ePHEDrine Sulfate (Pressors)    hydrALAZINE    HYDROcodone-acetaminophen    ipratropium-albuterol    labetalol    lidocaine (cardiac)    ondansetron    [COMPLETED] Insert Peripheral IV **AND** sodium chloride     Physical Findings        Trending Physical   Appearance, NFPE 8/28: NFPE completed and not consistent with nutrition diagnosis of malnutrition at this time using AND/ASPEN criteria      --  Edema  No edema documented      Bowel Function No BM since admission (x 2 days ago)     Tubes No feeding tube      Chewing/Swallowing No known issues      Skin No WOCN note at this time    - Gluteal wound  - stage 1 left heel  - Stage 2 right heel  - Left ankle wound     --  Protein Requirements    EST Needs, Method, Wt used  g/day (1.2-1.5 g/kg of IVW 72.7 kg)     Current Nutrition Orders & Evaluation of Intake       Oral Nutrition     Food Allergies NKFA   Current PO Diet Diet: Cardiac; Healthy Heart (2-3 Na+); Fluid Consistency: Thin (IDDSI 0)   Supplement None ordered    PO Evaluation     Trending % PO Intake 8/28: None documented    --  Nutritional Risk Screening        NRS-2002 Score          Nutrition Diagnosis         Nutrition Dx Problem 1 Increased nutrient needs related to increased needs for healing as " evidenced by wound.        Nutrition Dx Problem 2        Intervention Goal         Intervention Goal(s) Stable weight  PO intakes at least 75%     Nutrition Intervention        RD Action Completed NFPE     Nutrition Prescription          Diet Prescription Heart Healthy    Supplement Prescription None    --  Monitor/Evaluation        Monitor Per protocol, I&O, PO intake, Supplement intake, Pertinent labs, Weight, Skin status, GI status, Symptoms, POC/GOC       Electronically signed by:  Wendy Montenegro RD  08/28/24 10:35 EDT

## 2024-08-28 NOTE — DISCHARGE SUMMARY
[facility] Medicine Services  Discharge Summary    Date of Service: 24  Patient Name: Kevin Hurtado  : 1960  MRN: [id number]    Date of Admission: 2024  Discharge Diagnosis: nausea, vomiting  Date of Discharge:  24  Primary Care Physician: Provider, No Known    Presenting Problem:   Ureterolithiasis [N20.1]  Ileus [K56.7]  Nausea & vomiting [R11.2]  Pneumonia of right lower lobe due to infectious organism [J18.9]  Vomiting, unspecified vomiting type, unspecified whether nausea present [R11.10]    Active and Resolved Hospital Problems:  Active Hospital Problems    Diagnosis POA    **Nausea & vomiting [R11.2] Yes      Resolved Hospital Problems   No resolved problems to display.     Episodes of nausea, vomiting question coffee-ground emesis  consulted GI >EGD>Siddiqi's esophagus with salmon-colored mucosa extending above the upper extent of the gastric folds, from approximately 34 cm to 29 cm, C5M6.  For quad biopsies obtained every 2 cm with cold forceps for histology and to rule out intestinal metaplasia or dysplasia.  No significant retained fluid in the esophagus.  2.  Medium size sliding hiatal hernia, 5 cm in length.  Diaphragmatic hiatus at 39 cm, upper extent of the gastric folds at 34 cm, Z-line at 29 cm.  3.  Normal-appearing mucosa throughout the stomach.  No ulcers were seen.  No significant retained food or fluid in the stomach.  4.  Normal mucosa of the duodenal bulb and second and third portion of the duodenum.  No significant retained food or fluid in the duodenum.  Recommendations:  -Okay to resume regular/cardiac diet  -Continue PPI daily  -Overdue for screening colonoscopy, can arrange for this as an outpatient  -Okay for discharge from GI perspective when otherwise medically stable from cardiac and respiratory issues  CT shows distended and fluid-filled stomach and esophagus.  He does take Aleve almost daily.  Concern for ulcer.  CBC is unremarkable.  He  has been started on IV PPI twice daily.  No further vomiting this morning.  Will proceed with EGD today for further evaluation.    -Monitor H&H.  He does have very minimally elevated liver enzymes with total bilirubin 1.4 and AST 43.  Lipase normal.  Continue to monitor liver enzymes.  Check viral hepatitis panel.  Neurology has also been consulted.  He is on antibiotics for pneumonia.  Continue supportive care.  IV fluid     Possible aspiration pneumonia>Empiric antibiotic in the form of Unasyn  Speech to see this patient   Blood culture neg     Left-sided renal pelvis stone  Urology consulted>Discussed shockwave lithotripsy and stent placement with the patient. We will need to arrange for this as an elective outpatient surgery once his pneumonia has been well treated. No indication for acute intervention at this time   May need to get CT scan of chest     History of CVA with left-sided hemiparesis  Hypertension  History of CHF  History of dysphagia  Hyperlipidemia  Morbid obesity  PT OT     Hospital Course     HPI: This is 64-year-old male with past medical history of CVA and left-sided weakness and dysphagia who is currently on regular diet and thin liquid was sent from nursing facility because of episodes of nausea vomiting and there was concern about aspiration pneumonia.  Patient has some cough and congestion.  CT scan of abdomen pelvis in ER showed distended stomach kidney stone and possible aspiration pneumonia.  Patient is currently being admitted for further management.  Patient is not a good historian and was sleeping     8/27/24-no acute distress, vital signs stable, GI was consulted, patient to undergo EGD today.  Discussed with RN  8/28/24 patient seen and examined in bed no acute distress, doing better today, will discharge patient home.  Condition at discharge stable.    DISCHARGE Follow Up Recommendations for labs and diagnostics: follow with pcp in one week    Reasons For Change In Medications and  Indications for New Medications:  START taking:  amoxicillin-clavulanate (AUGMENTIN)   naloxone (NARCAN)   pantoprazole (Protonix)    STOP taking:  ibuprofen 600 MG tablet (ADVIL,MOTRIN    Day of Discharge     Vital Signs:  Temp:  [97.9 °F (36.6 °C)-99.3 °F (37.4 °C)] 98.2 °F (36.8 °C)  Heart Rate:  [73-93] 84  Resp:  [11-22] 16  BP: ()/(44-69) 103/60  Flow (L/min):  [4-6] 6    Physical Exam   Constitutional: Patient appears well-developed and well-nourished and in no acute distress   HEENT:   Head: Normocephalic and atraumatic.   Eyes:  Pupils are equal, round, and reactive to light. EOM are intact. Sclera are anicteric and non-injected.  Mouth and Throat: Patient has moist mucous membranes.       Neck: Neck supple.  No thyromegaly present. No lymphadenopathy present. No  masses.      Cardiovascular: Inspection: No JVD present. Palpation:bilaterally. No leg edema. Auscultation: Regular rate, regular rhythm, S1 normal and S2 normal. reveals no gallop and no friction rub. No Carotid bruit bilaterally.     Pulmonary/Chest: Inspection: No distress, no use of accessory muscles. Lungs are clear to auscultation bilaterally. No respiratory distress. No wheezes. No rhonchi. No rales.      Abdomen /Gastrointestinal: Inspection: no distension. Palpation: no masses, no organomegaly. Soft. There is no tenderness. Bowel sounds are normal.   Extremities no cyanosis clubbing or edema     Neurological: Sleeping  Skin: Skin is warm. No rash noted. Nails show no clubbing.  No cyanosis or erythema. No bruising.    Pertinent  and/or Most Recent Results     LAB RESULTS:      Lab 08/28/24  0359 08/27/24  0140 08/26/24  1550 08/26/24  1302   WBC 5.51  --   --  9.29   HEMOGLOBIN 10.4*  --   --  13.6   HEMATOCRIT 33.0*  --   --  43.3   PLATELETS 136*  --   --  179   NEUTROS ABS 4.47  --   --  8.32*   IMMATURE GRANS (ABS) 0.03  --   --  0.04   LYMPHS ABS 0.46*  --   --  0.54*   MONOS ABS 0.34  --   --  0.29   EOS ABS 0.18  --   --   0.08   MCV 91.7  --   --  92.5   SED RATE  --  13  --   --    CRP  --  8.84*  --   --    PROCALCITONIN  --   --   --  0.32*   LACTATE  --   --  1.9  --          Lab 08/28/24  0359 08/26/24  1302   SODIUM 139 145   POTASSIUM 3.2* 3.6   CHLORIDE 104 104   CO2 26.8 30.2*   ANION GAP 8.2 10.8   BUN 16 14   CREATININE 0.80 1.02   EGFR 99.4 82.6   GLUCOSE 80 109*   CALCIUM 7.6* 8.8   MAGNESIUM  --  1.6         Lab 08/28/24  0359 08/26/24  1302   TOTAL PROTEIN 5.6* 6.9   ALBUMIN 3.0* 3.8   GLOBULIN 2.6 3.1   ALT (SGPT) 22 25   AST (SGOT) 56* 43*   BILIRUBIN 1.9* 1.4*   ALK PHOS 81 116   LIPASE  --  13         Lab 08/26/24  1302   PROBNP 243.0                 Brief Urine Lab Results  (Last result in the past 365 days)        Color   Clarity   Blood   Leuk Est   Nitrite   Protein   CREAT   Urine HCG        08/26/24 1459 Yellow   Slightly Cloudy   Large (3+)   Small (1+)   Negative   100 mg/dL (2+)                 Microbiology Results (last 10 days)       Procedure Component Value - Date/Time    Blood Culture - Blood, Arm, Right [[id number]]  (Normal) Collected: 08/26/24 1547    Lab Status: Preliminary result Specimen: Blood from Arm, Right Updated: 08/27/24 1601     Blood Culture No growth at 24 hours    Respiratory Panel PCR w/COVID-19(SARS-CoV-2) KEON/THIAGO/MJ/PAD/COR/BIGG In-House, NP Swab in UTM/VTM, 2 HR TAT - Swab, Nasopharynx [[id number]]  (Normal) Collected: 08/26/24 1302    Lab Status: Final result Specimen: Swab from Nasopharynx Updated: 08/26/24 1359     ADENOVIRUS, PCR Not Detected     Coronavirus 229E Not Detected     Coronavirus HKU1 Not Detected     Coronavirus NL63 Not Detected     Coronavirus OC43 Not Detected     COVID19 Not Detected     Human Metapneumovirus Not Detected     Human Rhinovirus/Enterovirus Not Detected     Influenza A PCR Not Detected     Influenza B PCR Not Detected     Parainfluenza Virus 1 Not Detected     Parainfluenza Virus 2 Not Detected     Parainfluenza Virus 3 Not Detected      Parainfluenza Virus 4 Not Detected     RSV, PCR Not Detected     Bordetella pertussis pcr Not Detected     Bordetella parapertussis PCR Not Detected     Chlamydophila pneumoniae PCR Not Detected     Mycoplasma pneumo by PCR Not Detected    Narrative:      In the setting of a positive respiratory panel with a viral infection PLUS a negative procalcitonin without other underlying concern for bacterial infection, consider observing off antibiotics or discontinuation of antibiotics and continue supportive care. If the respiratory panel is positive for atypical bacterial infection (Bordetella pertussis, Chlamydophila pneumoniae, or Mycoplasma pneumoniae), consider antibiotic de-escalation to target atypical bacterial infection.    Blood Culture - Blood, Arm, Right [[id number]]  (Normal) Collected: 08/26/24 1302    Lab Status: Preliminary result Specimen: Blood from Arm, Right Updated: 08/27/24 1601     Blood Culture No growth at 24 hours            CT Chest Without Contrast Diagnostic    Result Date: 8/26/2024  Impression: Impression: 1.Mild patchy airspace disease present within the posterior aspect of the lower lobes bilaterally, right greater than left, likely related to a mild pneumonia and atelectasis.. 2.Ancillary findings as described above. Electronically Signed: Evelyn Barnett MD  8/26/2024 11:05 PM EDT  Workstation ID: [id number]    CT Abdomen Pelvis With Contrast    Result Date: 8/26/2024  Impression: Impression: 1. Moderate air-fluid distention of the stomach with fluid distention of the lower thoracic esophagus. Mild generalized gas in large and small bowel loops. No evidence of active bowel inflammation or bowel obstruction or focal mass lesion. 2. 12 mm left renal pelvic stone with left renal pelvic and proximal left ureteral inflammatory stranding. This stone could serve as a source of intermittent obstruction and inflammation. 2. Nonobstructing bilateral intrarenal calculi. 3. Patchy airspace disease in the  medial right lower lobe may represent atelectasis or developing pneumonia. Scattered atelectatic changes are present elsewhere within the lung bases. 4.. Additional chronic findings as described above. Electronically Signed: Casie Johnson MD  8/26/2024 2:07 PM EDT  Workstation ID: [id number]    XR Chest 1 View    Result Date: 8/26/2024  Impression: Impression: No acute chest finding. Electronically Signed: Casie Johnson MD  8/26/2024 12:59 PM EDT  Workstation ID: [id number]                 Labs Pending at Discharge:  Pending Labs       Order Current Status    Tissue Pathology Exam In process    Blood Culture - Blood, Arm, Right Preliminary result    Blood Culture - Blood, Arm, Right Preliminary result            Procedures Performed  Procedure(s):  ESOPHAGOGASTRODUODENOSCOPY with biopsy x 3 areas  08/27 1414 Upper GI Endoscopy    Description of Procedure:  A description of the procedure as well as risks, benefits and alternative methods were explained to the patient who voiced understanding and signed the corresponding consent form. A physical exam was performed and vital signs were monitored throughout the procedure.     An upper GI endoscope was placed into the mouth and proceeded through the esophagus, stomach and second portion of the duodenum without difficulty. The scope was then retroflexed and the fundus was visualized. The procedure was not difficult and there were no immediate complications.  There was no blood loss.     Impression:  1.  Siddiqi's esophagus with salmon-colored mucosa extending above the upper extent of the gastric folds, from approximately 34 cm to 29 cm, C5M6.  For quad biopsies obtained every 2 cm with cold forceps for histology and to rule out intestinal metaplasia or dysplasia.  No significant retained fluid in the esophagus.  2.  Medium size sliding hiatal hernia, 5 cm in length.  Diaphragmatic hiatus at 39 cm, upper extent of the gastric folds at 34 cm, Z-line at 29 cm.  3.   Normal-appearing mucosa throughout the stomach.  No ulcers were seen.  No significant retained food or fluid in the stomach.  4.  Normal mucosa of the duodenal bulb and second and third portion of the duodenum.  No significant retained food or fluid in the duodenum.     Recommendations:  -Okay to resume regular/cardiac diet  -Continue PPI daily  -Overdue for screening colonoscopy, can arrange for this as an outpatient  -Okay for discharge from GI perspective when otherwise medically stable from cardiac and respiratory issues     GI will see inpatient as needed     REINA Arizmendi MD      Date: 8/27/2024      Consults:   Consults       Date and Time Order Name Status Description    8/27/2024  9:23 AM Inpatient Gastroenterology Consult Completed     8/26/2024  3:59 PM Inpatient Urology Consult Completed     8/26/2024  3:44 PM Inpatient Gastroenterology Consult                 Assessment/Plan:     Principal Problem:    Nausea & vomiting     Large left renal pelvic stone which is not obstructing is asymptomatic     Plan  Will arrange for outpatient elective surgery of the stone with shockwave lithotripsy and stent placement.  We will arrange for this in a few weeks once his pneumonia has been well treated  Nothing further to add at this time so I will sign off  Please let me know if I can be of further assistance        Samuel Johnson MD  8/28/2024  07:08 EDT    Discharge Details        Discharge Medications        New Medications        Instructions Start Date   amoxicillin-clavulanate 875-125 MG per tablet  Commonly known as: AUGMENTIN   1 tablet, Oral, 2 Times Daily      naloxone 4 MG/0.1ML nasal spray  Commonly known as: NARCAN   Call 911. Don't prime. [place] in 1 nostril for overdose. Repeat in 2-3 minutes in other nostril if no or minimal breathing/responsiveness.      pantoprazole 40 MG EC tablet  Commonly known as: Protonix   40 mg, Oral, 2 Times Daily             Continue These Medications         Instructions Start Date   aluminum-magnesium hydroxide-simethicone 200-200-20 MG/5ML suspension  Commonly known as: MAALOX/MYLANTA   30 mL, Oral, Every 8 Hours PRN      aspirin 81 MG chewable tablet   81 mg, Oral, Every Morning      atorvastatin 40 MG tablet  Commonly known as: LIPITOR   40 mg, Oral, Nightly      baclofen 10 MG tablet  Commonly known as: LIORESAL   10 mg, Oral, 3 Times Daily      calcium carbonate 500 MG chewable tablet  Commonly known as: TUMS   1 tablet, Oral, 3 Times Daily      carboxymethylcellulose 0.5 % solution  Commonly known as: REFRESH PLUS   2 drops, Both Eyes, 2 Times Daily      Eucerin cream   1 Application, Topical, 2 Times Daily, Apply to bilateral arms topically every shift for eczema      ezetimibe 10 MG tablet  Commonly known as: ZETIA   10 mg, Oral, Every Morning      famotidine 20 MG tablet  Commonly known as: PEPCID   20 mg, Oral, 2 Times Daily      gabapentin 400 MG capsule  Commonly known as: NEURONTIN   400 mg, Oral, 3 Times Daily      HYDROcodone-acetaminophen  MG per tablet  Commonly known as: NORCO   1 tablet, Oral, 3 Times Daily      ketoconazole 2 % shampoo  Commonly known as: NIZORAL   1 Application, Topical, Weekly, Apply to scalp topically on dayshift every Wednesday, Saturday for tinea versicolor for 6 months       Lidocaine 4 % aerosol   1 Application, Transdermal, 2 Times Daily, Apply to R 4th toe distal topically every morning and at bedtime for pain      meclizine 25 MG tablet  Commonly known as: ANTIVERT   25 mg, Oral, Every 8 Hours PRN      melatonin 1 MG tablet   3 mg, Oral, Nightly      ondansetron 4 MG tablet  Commonly known as: ZOFRAN   4 mg, Oral, Every 6 Hours PRN      Scopolamine 1 MG/3DAYS patch   1 patch, Transdermal, Every 72 Hours      tamsulosin 0.4 MG capsule 24 hr capsule  Commonly known as: FLOMAX   1 capsule, Oral, Every Morning      white petrolatum ointment   1 Application, Topical, Every Night at Bedtime, Instill 1 application in both  eyes at bedtime for eye maintenance 1/2 inch ribbon outer lid             Stop These Medications      ibuprofen 600 MG tablet  Commonly known as: ADVIL,MOTRIN              No Known Allergies      Discharge Disposition:   Skilled Nursing Facility (DC - External)    Diet:  Hospital:  Diet Order   Procedures    Diet: Cardiac; Healthy Heart (2-3 Na+); Fluid Consistency: Thin (IDDSI 0)         Discharge Activity:   Activity Instructions       Gradually Increase Activity Until at Pre-Hospitalization Level                CODE STATUS:  Code Status and Medical Interventions: CPR (Attempt to Resuscitate); Full Support   Ordered at: 08/26/24 1605     Code Status (Patient has no pulse and is not breathing):    CPR (Attempt to Resuscitate)     Medical Interventions (Patient has pulse or is breathing):    Full Support         No future appointments.    Additional Instructions for the Follow-ups that You Need to Schedule       Discharge Follow-up with PCP   As directed       Currently Documented PCP:    Provider, No Known    PCP Phone Number:    None     Follow Up Details: 1 week                Time spent on Discharge including face to face service:  >30 minutes    Signature: Electronically signed by Stanford Kiran MD, 08/28/24, 12:22 EDT.  [facility] Hospitalist Team

## 2024-08-28 NOTE — ANESTHESIA POSTPROCEDURE EVALUATION
Patient: Kevin Hurtado    Procedure Summary       Date: 08/27/24 Room / Location: [facility] ENDOSCOPY 1 / [facility] ENDOSCOPY    Anesthesia Start: 1407 Anesthesia Stop: 1434    Procedure: ESOPHAGOGASTRODUODENOSCOPY with biopsy x 3 areas Diagnosis:       Nausea and vomiting, unspecified vomiting type      (Nausea and vomiting, unspecified vomiting type [R11.2])    Surgeons: KG Arizmendi MD Provider: Tara Hammonds MD    Anesthesia Type: general ASA Status: 3            Anesthesia Type: general    Vitals  Vitals Value Taken Time   BP 98/59 08/27/24 1525   Temp     Pulse 80 08/27/24 1532   Resp 15 08/27/24 1450   SpO2 96 % 08/27/24 1532   Vitals shown include unfiled device data.        Post Anesthesia Care and Evaluation    Patient location during evaluation: PACU  Patient participation: complete - patient participated  Level of consciousness: awake and alert  Pain management: satisfactory to patient    Airway patency: patent  Anesthetic complications: No anesthetic complications  PONV Status: none  Cardiovascular status: acceptable  Respiratory status: acceptable  Hydration status: acceptable

## 2024-08-28 NOTE — PLAN OF CARE
Goal Outcome Evaluation:         Patient had EGD yesterday with biopsies.  Restarted on healthy heart diet.  Not much appetite.  NS @ 50.  IV antibiotics.  Vitals WNL.

## 2024-08-28 NOTE — CASE MANAGEMENT/SOCIAL WORK
Case Management Discharge Note      [place] [facility]    Provided Post Acute Provider List?: N/A  Provided Post Acute Provider Quality & Resource List?: N/A    Selected Continued Care - Discharged on 8/28/2024 Admission date: 8/26/2024 - Discharge disposition: Skilled Nursing Facility (DC - External)      Destination Coordination complete.      Service Provider Selected Services Address Phone Fax Patient Preferred    [facility] [place] [facility] [address] [facility] [place] [address] [phone] --           Transportation Services  Ambulance: [facility]    Final Discharge Disposition Code: 04 - intermediate care facility      Continued Stay Note   Ashish     Patient Name: Kevin Hurtado  MRN: [id number]  Today's Date: 8/28/2024    Admit Date: 8/26/2024    Plan: D/C Plan[place] [facility] Will need transportation assist.   Discharge Plan       Row Name 08/28/24 1636       Plan    Plan Comments Notiifed of DC orders.  [place] [facility]  Medical necessity completed, [facility] request sent.  Nurse notified.    Final Discharge Disposition Code 04 - intermediate care facility    [place] [facility]               Expected Discharge Date and Time       Expected Discharge Date Expected Discharge Time    Aug 28, 2024               Chitra Herzog RN     Office Phone [phone]  Office Cell [phone]

## 2024-08-28 NOTE — CASE MANAGEMENT/SOCIAL WORK
"Physicians Statement of Medical Necessity for  Ambulance Transportation    GENERAL INFORMATION     Name: Kevin Hurtado  YOB: 1960  Medicaid HMO #: [id number]   Transport Date: 8/28/2024 (Valid for round trips this date, or for scheduled repetitive trips for 60 days from the date signed below.)  [facility] [address]  Destination: [place]  Is the Patient's stay covered under Medicare Part A (PPS/DRG?)No   Closest appropriate facility? Yes  If this a hosp-hosp transfer? No  Is this a hospice patient? No    MEDICAL NECESSITY QUESTIONAIRE    Ambulance Transportation is medically necessary only if other means of transportation are contraindicated or would be potentially harmful to the patient.  To meet this requirement, the patient must be either \"bed confined\" or suffer from a condition such that transport by means other than an ambulance is contraindicated by the patient's condition.  The following questions must be answered by the healthcare professional signing below for this form to be valid:     1) Describe the MEDICAL CONDITION (physical and/or mental) of this patient AT THE TIME OF AMBULANCE TRANSPORT that requires the patient to be transported in an ambulance, and why transport by other means is contraindicated by the patient's condition: Baseline connie lift for transfers.   Max assist of 2 for bed mobility.   History CVA with left sided heiparesis.   L shoulder subluxation.   LLE fixed in hip ER/knee flexion, stage 1 pressure ulcer L heel, stage 2 pressure ulcer R heel.  DTI Left ankle.   Past Medical History:   Diagnosis Date    Elevated cholesterol     GERD (gastroesophageal reflux disease)     Stroke       History reviewed. No pertinent surgical history.   2) Is this patient \"bed confined\" as defined below?Yes   To be \"bed confined\" the patient must satisfy all three of the following criteria:  (1) unable to get up from bed without assistance; AND (2) unable to ambulate;  AND " (3) unable to sit in a chair or wheelchair.  3) Can this patient safely be transported by car or wheelchair van (I.e., may safely sit during transport, without an attendant or monitoring?)No   4. In addition to completing questions 1-3 above, please check any of the following conditions that apply*:          *Note: supporting documentation for any boxes checked must be maintained in the patient's medical records Moderate/severe pain on movement and Unable to tolerate seated position for time needed to transport      SIGNATURE OF PHYSICIAN OR OTHER AUTHORIZED HEALTHCARE PROFESSIONAL    I certify that the above information is true and correct based on my evaluation of this patient, and represent that the patient requires transport by ambulance and that other forms of transport are contraindicated.  I understand that this information will be used by the Centers for Medicare and Medicaid Services (CMS) to support the determiniation of medical necessity for ambulance services, and I represent that I have personal knowledge of the patient's condition at the time of transport.    kk   If this box is checked, I also certify that the patient is physically or mentally incapable of signing the ambulance service's claim form and that the institution with which I am affiliated has furnished care, services or assistance to the patient.  My signature below is made on behalf of the patient pursuant to 42 .36(b)(4). In accordance with 42 .37, the specific reason(s) that the patient is physically or mentally incapable of signing the claim for is as follows: confusion    Signature of Physician or Healthcare Professional   Chitra Herzog RN/ Stanford Kiran MD Date/Time:   8/28/24 1010     (For Scheduled repetitive transport, this form is not valid for transports performed more than 60 days after this date).                                                                                                                                             Chitra Herzog RN/ Stanford Kiran MD  --------------------------------------------------------------------------------------------  Printed Name and Credentials of Physician or Authorized Healthcare Professional     *Form must be signed by patient's attending physician for scheduled, repetitive transports,.  For non-repetitive ambulance transports, if unable to obtain the signature of the attending physician, any of the following may sign (please select below):     Physician  Clinical Nurse Specialist  Registered Nurse x    Physician Assistant  Discharge Planner  Licensed Practical Nurse     Nurse Practitioner   x

## 2024-08-29 LAB
LAB AP CASE REPORT: NORMAL
PATH REPORT.FINAL DX SPEC: NORMAL
PATH REPORT.GROSS SPEC: NORMAL

## 2024-08-31 LAB
BACTERIA SPEC AEROBE CULT: NORMAL
BACTERIA SPEC AEROBE CULT: NORMAL

## 2025-04-08 ENCOUNTER — APPOINTMENT (OUTPATIENT)
Dept: CT IMAGING | Facility: HOSPITAL | Age: 65
End: 2025-04-08
Payer: MEDICARE

## 2025-04-08 ENCOUNTER — APPOINTMENT (OUTPATIENT)
Dept: GENERAL RADIOLOGY | Facility: HOSPITAL | Age: 65
End: 2025-04-08
Payer: MEDICARE

## 2025-04-08 ENCOUNTER — HOSPITAL ENCOUNTER (INPATIENT)
Facility: HOSPITAL | Age: 65
LOS: 2 days | Discharge: NURSING, REHAB OR LONG-TERM CARE | End: 2025-04-10
Attending: EMERGENCY MEDICINE | Admitting: STUDENT IN AN ORGANIZED HEALTH CARE EDUCATION/TRAINING PROGRAM
Payer: MEDICAID

## 2025-04-08 DIAGNOSIS — R79.89 INCREASED AMMONIA LEVEL: ICD-10-CM

## 2025-04-08 DIAGNOSIS — R50.9 ACUTE FEBRILE ILLNESS: Primary | ICD-10-CM

## 2025-04-08 PROBLEM — R41.82 ALTERED MENTAL STATUS: Status: ACTIVE | Noted: 2025-04-08

## 2025-04-08 LAB
ALBUMIN SERPL-MCNC: 3.7 G/DL (ref 3.5–5.2)
ALBUMIN/GLOB SERPL: 1 G/DL
ALP SERPL-CCNC: 121 U/L (ref 39–117)
ALT SERPL W P-5'-P-CCNC: 10 U/L (ref 1–41)
AMMONIA BLD-SCNC: 101 UMOL/L (ref 16–60)
AMORPH URATE CRY URNS QL MICRO: ABNORMAL /HPF
ANION GAP SERPL CALCULATED.3IONS-SCNC: 12.6 MMOL/L (ref 5–15)
AST SERPL-CCNC: 33 U/L (ref 1–40)
B PARAPERT DNA SPEC QL NAA+PROBE: NOT DETECTED
B PERT DNA SPEC QL NAA+PROBE: NOT DETECTED
BACTERIA UR QL AUTO: ABNORMAL /HPF
BASOPHILS # BLD AUTO: 0.02 10*3/MM3 (ref 0–0.2)
BASOPHILS NFR BLD AUTO: 0.4 % (ref 0–1.5)
BILIRUB SERPL-MCNC: 1.5 MG/DL (ref 0–1.2)
BILIRUB UR QL STRIP: NEGATIVE
BUN SERPL-MCNC: 10 MG/DL (ref 8–23)
BUN/CREAT SERPL: 9.9 (ref 7–25)
C PNEUM DNA NPH QL NAA+NON-PROBE: NOT DETECTED
CALCIUM SPEC-SCNC: 9.4 MG/DL (ref 8.6–10.5)
CHLORIDE SERPL-SCNC: 98 MMOL/L (ref 98–107)
CLARITY UR: ABNORMAL
CO2 SERPL-SCNC: 26.4 MMOL/L (ref 22–29)
COLOR UR: ABNORMAL
CREAT SERPL-MCNC: 1.01 MG/DL (ref 0.76–1.27)
D-LACTATE SERPL-SCNC: 2.1 MMOL/L (ref 0.3–2)
DEPRECATED RDW RBC AUTO: 49.2 FL (ref 37–54)
EGFRCR SERPLBLD CKD-EPI 2021: 83.6 ML/MIN/1.73
EOSINOPHIL # BLD AUTO: 0.02 10*3/MM3 (ref 0–0.4)
EOSINOPHIL NFR BLD AUTO: 0.4 % (ref 0.3–6.2)
ERYTHROCYTE [DISTWIDTH] IN BLOOD BY AUTOMATED COUNT: 14.1 % (ref 12.3–15.4)
FLUAV SUBTYP SPEC NAA+PROBE: NOT DETECTED
FLUBV RNA ISLT QL NAA+PROBE: NOT DETECTED
GLOBULIN UR ELPH-MCNC: 3.7 GM/DL
GLUCOSE BLDC GLUCOMTR-MCNC: 101 MG/DL (ref 70–105)
GLUCOSE SERPL-MCNC: 117 MG/DL (ref 65–99)
GLUCOSE UR STRIP-MCNC: NEGATIVE MG/DL
HADV DNA SPEC NAA+PROBE: NOT DETECTED
HCOV 229E RNA SPEC QL NAA+PROBE: NOT DETECTED
HCOV HKU1 RNA SPEC QL NAA+PROBE: NOT DETECTED
HCOV NL63 RNA SPEC QL NAA+PROBE: NOT DETECTED
HCOV OC43 RNA SPEC QL NAA+PROBE: NOT DETECTED
HCT VFR BLD AUTO: 45 % (ref 37.5–51)
HGB BLD-MCNC: 13.9 G/DL (ref 13–17.7)
HGB UR QL STRIP.AUTO: ABNORMAL
HMPV RNA NPH QL NAA+NON-PROBE: NOT DETECTED
HOLD SPECIMEN: NORMAL
HPIV1 RNA ISLT QL NAA+PROBE: NOT DETECTED
HPIV2 RNA SPEC QL NAA+PROBE: NOT DETECTED
HPIV3 RNA NPH QL NAA+PROBE: NOT DETECTED
HPIV4 P GENE NPH QL NAA+PROBE: NOT DETECTED
HYALINE CASTS UR QL AUTO: ABNORMAL /LPF
IMM GRANULOCYTES # BLD AUTO: 0.01 10*3/MM3 (ref 0–0.05)
IMM GRANULOCYTES NFR BLD AUTO: 0.2 % (ref 0–0.5)
KETONES UR QL STRIP: ABNORMAL
LEUKOCYTE ESTERASE UR QL STRIP.AUTO: ABNORMAL
LYMPHOCYTES # BLD AUTO: 0.51 10*3/MM3 (ref 0.7–3.1)
LYMPHOCYTES NFR BLD AUTO: 10.5 % (ref 19.6–45.3)
M PNEUMO IGG SER IA-ACNC: NOT DETECTED
MAGNESIUM SERPL-MCNC: 1.8 MG/DL (ref 1.6–2.4)
MCH RBC QN AUTO: 29 PG (ref 26.6–33)
MCHC RBC AUTO-ENTMCNC: 30.9 G/DL (ref 31.5–35.7)
MCV RBC AUTO: 93.9 FL (ref 79–97)
MONOCYTES # BLD AUTO: 0.14 10*3/MM3 (ref 0.1–0.9)
MONOCYTES NFR BLD AUTO: 2.9 % (ref 5–12)
MUCOUS THREADS URNS QL MICRO: ABNORMAL /HPF
NEUTROPHILS NFR BLD AUTO: 4.18 10*3/MM3 (ref 1.7–7)
NEUTROPHILS NFR BLD AUTO: 85.6 % (ref 42.7–76)
NITRITE UR QL STRIP: NEGATIVE
NRBC BLD AUTO-RTO: 0 /100 WBC (ref 0–0.2)
PH UR STRIP.AUTO: <=5 [PH] (ref 5–8)
PLATELET # BLD AUTO: 189 10*3/MM3 (ref 140–450)
PMV BLD AUTO: 9.4 FL (ref 6–12)
POTASSIUM SERPL-SCNC: 3.9 MMOL/L (ref 3.5–5.2)
PROT SERPL-MCNC: 7.4 G/DL (ref 6–8.5)
PROT UR QL STRIP: ABNORMAL
RBC # BLD AUTO: 4.79 10*6/MM3 (ref 4.14–5.8)
RBC # UR STRIP: ABNORMAL /HPF
REF LAB TEST METHOD: ABNORMAL
RHINOVIRUS RNA SPEC NAA+PROBE: NOT DETECTED
RSV RNA NPH QL NAA+NON-PROBE: NOT DETECTED
SARS-COV-2 RNA RESP QL NAA+PROBE: NOT DETECTED
SODIUM SERPL-SCNC: 137 MMOL/L (ref 136–145)
SP GR UR STRIP: 1.03 (ref 1–1.03)
SQUAMOUS #/AREA URNS HPF: ABNORMAL /HPF
TRANS CELLS #/AREA URNS HPF: ABNORMAL /HPF
TSH SERPL DL<=0.05 MIU/L-ACNC: 2.22 UIU/ML (ref 0.27–4.2)
UROBILINOGEN UR QL STRIP: ABNORMAL
WBC # UR STRIP: ABNORMAL /HPF
WBC NRBC COR # BLD AUTO: 4.88 10*3/MM3 (ref 3.4–10.8)
WHOLE BLOOD HOLD COAG: NORMAL

## 2025-04-08 PROCEDURE — 99285 EMERGENCY DEPT VISIT HI MDM: CPT

## 2025-04-08 PROCEDURE — 87086 URINE CULTURE/COLONY COUNT: CPT | Performed by: EMERGENCY MEDICINE

## 2025-04-08 PROCEDURE — 83735 ASSAY OF MAGNESIUM: CPT | Performed by: EMERGENCY MEDICINE

## 2025-04-08 PROCEDURE — 0202U NFCT DS 22 TRGT SARS-COV-2: CPT | Performed by: EMERGENCY MEDICINE

## 2025-04-08 PROCEDURE — 71045 X-RAY EXAM CHEST 1 VIEW: CPT

## 2025-04-08 PROCEDURE — 83605 ASSAY OF LACTIC ACID: CPT

## 2025-04-08 PROCEDURE — 25010000002 PIPERACILLIN SOD-TAZOBACTAM PER 1 G: Performed by: EMERGENCY MEDICINE

## 2025-04-08 PROCEDURE — 25510000001 IOPAMIDOL PER 1 ML: Performed by: STUDENT IN AN ORGANIZED HEALTH CARE EDUCATION/TRAINING PROGRAM

## 2025-04-08 PROCEDURE — 81001 URINALYSIS AUTO W/SCOPE: CPT | Performed by: EMERGENCY MEDICINE

## 2025-04-08 PROCEDURE — 87040 BLOOD CULTURE FOR BACTERIA: CPT | Performed by: EMERGENCY MEDICINE

## 2025-04-08 PROCEDURE — P9612 CATHETERIZE FOR URINE SPEC: HCPCS

## 2025-04-08 PROCEDURE — 74177 CT ABD & PELVIS W/CONTRAST: CPT

## 2025-04-08 PROCEDURE — 82140 ASSAY OF AMMONIA: CPT | Performed by: EMERGENCY MEDICINE

## 2025-04-08 PROCEDURE — 82948 REAGENT STRIP/BLOOD GLUCOSE: CPT

## 2025-04-08 PROCEDURE — 80053 COMPREHEN METABOLIC PANEL: CPT | Performed by: EMERGENCY MEDICINE

## 2025-04-08 PROCEDURE — 36415 COLL VENOUS BLD VENIPUNCTURE: CPT

## 2025-04-08 PROCEDURE — 84443 ASSAY THYROID STIM HORMONE: CPT | Performed by: EMERGENCY MEDICINE

## 2025-04-08 PROCEDURE — 85025 COMPLETE CBC W/AUTO DIFF WBC: CPT | Performed by: EMERGENCY MEDICINE

## 2025-04-08 PROCEDURE — 93005 ELECTROCARDIOGRAM TRACING: CPT | Performed by: EMERGENCY MEDICINE

## 2025-04-08 RX ORDER — SODIUM CHLORIDE 0.9 % (FLUSH) 0.9 %
10 SYRINGE (ML) INJECTION AS NEEDED
Status: DISCONTINUED | OUTPATIENT
Start: 2025-04-08 | End: 2025-04-10 | Stop reason: HOSPADM

## 2025-04-08 RX ORDER — SODIUM CHLORIDE 9 MG/ML
100 INJECTION, SOLUTION INTRAVENOUS CONTINUOUS
Status: DISPENSED | OUTPATIENT
Start: 2025-04-09 | End: 2025-04-09

## 2025-04-08 RX ORDER — BISACODYL 10 MG
10 SUPPOSITORY, RECTAL RECTAL DAILY PRN
Status: DISCONTINUED | OUTPATIENT
Start: 2025-04-08 | End: 2025-04-10 | Stop reason: HOSPADM

## 2025-04-08 RX ORDER — BISACODYL 5 MG/1
5 TABLET, DELAYED RELEASE ORAL DAILY PRN
Status: DISCONTINUED | OUTPATIENT
Start: 2025-04-08 | End: 2025-04-10 | Stop reason: HOSPADM

## 2025-04-08 RX ORDER — LACTULOSE 10 G/15ML
20 SOLUTION ORAL 3 TIMES DAILY
Status: DISCONTINUED | OUTPATIENT
Start: 2025-04-09 | End: 2025-04-10 | Stop reason: HOSPADM

## 2025-04-08 RX ORDER — POLYETHYLENE GLYCOL 3350 17 G/17G
17 POWDER, FOR SOLUTION ORAL DAILY PRN
Status: DISCONTINUED | OUTPATIENT
Start: 2025-04-08 | End: 2025-04-10 | Stop reason: HOSPADM

## 2025-04-08 RX ORDER — NYSTATIN 100000 [USP'U]/G
POWDER TOPICAL EVERY 12 HOURS SCHEDULED
Status: DISCONTINUED | OUTPATIENT
Start: 2025-04-09 | End: 2025-04-10 | Stop reason: HOSPADM

## 2025-04-08 RX ORDER — AMOXICILLIN 250 MG
2 CAPSULE ORAL 2 TIMES DAILY
Status: DISCONTINUED | OUTPATIENT
Start: 2025-04-09 | End: 2025-04-10 | Stop reason: HOSPADM

## 2025-04-08 RX ORDER — SODIUM CHLORIDE 9 MG/ML
40 INJECTION, SOLUTION INTRAVENOUS AS NEEDED
Status: DISCONTINUED | OUTPATIENT
Start: 2025-04-08 | End: 2025-04-10 | Stop reason: HOSPADM

## 2025-04-08 RX ORDER — IOPAMIDOL 755 MG/ML
100 INJECTION, SOLUTION INTRAVASCULAR
Status: COMPLETED | OUTPATIENT
Start: 2025-04-08 | End: 2025-04-08

## 2025-04-08 RX ORDER — DIPHENHYDRAMINE HYDROCHLORIDE 50 MG/ML
25 INJECTION, SOLUTION INTRAMUSCULAR; INTRAVENOUS EVERY 6 HOURS PRN
Status: DISCONTINUED | OUTPATIENT
Start: 2025-04-08 | End: 2025-04-10 | Stop reason: HOSPADM

## 2025-04-08 RX ORDER — NITROGLYCERIN 0.4 MG/1
0.4 TABLET SUBLINGUAL
Status: DISCONTINUED | OUTPATIENT
Start: 2025-04-08 | End: 2025-04-10 | Stop reason: HOSPADM

## 2025-04-08 RX ORDER — LACTULOSE 10 G/15ML
30 SOLUTION ORAL ONCE
Status: DISCONTINUED | OUTPATIENT
Start: 2025-04-08 | End: 2025-04-08

## 2025-04-08 RX ORDER — SODIUM CHLORIDE 0.9 % (FLUSH) 0.9 %
10 SYRINGE (ML) INJECTION EVERY 12 HOURS SCHEDULED
Status: DISCONTINUED | OUTPATIENT
Start: 2025-04-08 | End: 2025-04-10 | Stop reason: HOSPADM

## 2025-04-08 RX ORDER — ACETAMINOPHEN 650 MG/1
650 SUPPOSITORY RECTAL ONCE
Status: COMPLETED | OUTPATIENT
Start: 2025-04-08 | End: 2025-04-08

## 2025-04-08 RX ADMIN — PIPERACILLIN AND TAZOBACTAM 3.38 G: 3; .375 INJECTION, POWDER, FOR SOLUTION INTRAVENOUS at 21:53

## 2025-04-08 RX ADMIN — IOPAMIDOL 100 ML: 755 INJECTION, SOLUTION INTRAVENOUS at 22:59

## 2025-04-08 RX ADMIN — ACETAMINOPHEN 650 MG: 650 SUPPOSITORY RECTAL at 19:34

## 2025-04-08 NOTE — Clinical Note
Level of Care: Telemetry [5]   Admitting Physician: MARCELA SULLIVAN [[id number]]   Attending Physician: MARCELA SULLIVAN [[id number]]

## 2025-04-08 NOTE — LETTER
"    EMS Transport Request  For use at [facility], [place], [place], [place], and [place] only   Patient Name: Kevin Hurtado : 1960   Weight:89.8 kg (198 lb) Pick-up Location: [facility] BLS/ALS: BLS/ALS: BLS   Insurance: [employer] MEDICARE REPLACEMENT Auth End Date: 4/10/2025   Pre-Cert #: D/C Summary complete:    Destination: Other [place]   Contact Precautions: None   Equipment (O2, Fluids, etc.): None   Arrive By Date/Time: 4/10/2025 1330 Stretcher/WC: Stretcher   CM Requesting: Claudia Osuna RN Ext: [phone]   Notes/Medical Necessity: 198lbs, RA, confused, bedbound     ______________________________________________________________________    *Only 2 patient bags OR 1 carry-on size bag are permitted.  Wheelchairs and walkers CANNOT transported with the patient. Acknowledge: Yes    Physicians Statement of Medical Necessity for  Ambulance Transportation     GENERAL INFORMATION      Name: Kevin Hurtado  YOB: 1960  Medicare #: [id number]   Transport Date: 4/10/2025 (Valid for round trips this date, or for scheduled repetitive trips for 60 days from the date signed below.)  Origin: [facility]  Destination: [place]   Is the Patient's stay covered under Medicare Part A (PPS/DRG?)Yes  Closest appropriate facility? Yes  If this a hosp-hosp transfer? No  Is this a hospice patient? No     MEDICAL NECESSITY QUESTIONAIRE     Ambulance Transportation is medically necessary only if other means of transportation are contraindicated or would be potentially harmful to the patient.  To meet this requirement, the patient must be either \"bed confined\" or suffer from a condition such that transport by means other than an ambulance is contraindicated by the patient's condition.  The following questions must be answered by the healthcare professional signing below for this form to be valid:      1) Describe the MEDICAL CONDITION (physical and/or mental) of this patient AT THE TIME OF AMBULANCE TRANSPORT that " "requires the patient to be transported in an ambulance, and why transport by other means is contraindicated by the patient's condition: bedbound  Medical History        Past Medical History:   Diagnosis Date   • Elevated cholesterol     • GERD (gastroesophageal reflux disease)     • Stroke           Surgical History         Past Surgical History:   Procedure Laterality Date   • ENDOSCOPY N/A 8/27/2024     Procedure: ESOPHAGOGASTRODUODENOSCOPY with biopsy x 3 areas;  Surgeon: KG Arizmendi MD;  Location: [facility] ENDOSCOPY;  Service: Gastroenterology;  Laterality: N/A;  post op:         2) Is this patient \"bed confined\" as defined below?Yes              To be \"bed confined\" the patient must satisfy all three of the following criteria:       (1) unable to get up from bed without assistance; AND (2) unable to ambulate;  AND (3) unable to sit in a chair or wheelchair.  3) Can this patient safely be transported by car or wheelchair van (I.e., may safely sit during transport, without an attendant or monitoring?)No   4. In addition to completing questions 1-3 above, please check any of the following conditions that apply*:          *Note: supporting documentation for any boxes checked must be maintained in the patient's medical records Unable to tolerate seated position for time needed to transport        SIGNATURE OF PHYSICIAN OR OTHER AUTHORIZED HEALTHCARE PROFESSIONAL     I certify that the above information is true and correct based on my evaluation of this patient, and represent that the patient requires transport by ambulance and that other forms of transport are contraindicated.  I understand that this information will be used by the Centers for Medicare and Medicaid Services (CMS) to support the determiniation of medical necessity for ambulance services, and I represent that I have personal knowledge of the patient's condition at the time of transport.     x   If this box is checked, I also certify that the " patient is physically or mentally incapable of signing the ambulance service's claim form and that the institution with which I am affiliated has furnished care, services or assistance to the patient.  My signature below is made on behalf of the patient pursuant to 42 .36(b)(4). In accordance with 42 .37, the specific reason(s) that the patient is physically or mentally incapable of signing the claim for is as follows: confusion     Signature of Physician or Healthcare Professional      Claudia Osuna RN Date/Time:   4/10/2025 1250      (For Scheduled repetitive transport, this form is not valid for transports performed more than 60 days after this date).                                                                                                                                            --------------------------------------------------------------------------------------------  Printed Name and Credentials of Physician or Authorized Healthcare Professional      *Form must be signed by patient's attending physician for scheduled, repetitive transports,.  For non-repetitive ambulance transports, if unable to obtain the signature of the attending physician, any of the following may sign (please select below):       Physician   Clinical Nurse Specialist   Registered Nurse x     Physician Assistant   Discharge Planner   Licensed Practical Nurse       Nurse Practitioner    x

## 2025-04-09 ENCOUNTER — APPOINTMENT (OUTPATIENT)
Dept: GENERAL RADIOLOGY | Facility: HOSPITAL | Age: 65
End: 2025-04-09
Payer: MEDICARE

## 2025-04-09 ENCOUNTER — ANESTHESIA EVENT (OUTPATIENT)
Dept: PERIOP | Facility: HOSPITAL | Age: 65
End: 2025-04-09
Payer: MEDICARE

## 2025-04-09 ENCOUNTER — ANESTHESIA (OUTPATIENT)
Dept: PERIOP | Facility: HOSPITAL | Age: 65
End: 2025-04-09
Payer: MEDICARE

## 2025-04-09 ENCOUNTER — APPOINTMENT (OUTPATIENT)
Dept: ULTRASOUND IMAGING | Facility: HOSPITAL | Age: 65
End: 2025-04-09
Payer: MEDICARE

## 2025-04-09 ENCOUNTER — APPOINTMENT (OUTPATIENT)
Dept: CT IMAGING | Facility: HOSPITAL | Age: 65
End: 2025-04-09
Payer: MEDICARE

## 2025-04-09 PROBLEM — L89.152 PRESSURE INJURY OF SACRAL REGION, STAGE 2: Status: ACTIVE | Noted: 2025-04-09

## 2025-04-09 PROBLEM — B37.2 YEAST INFECTION OF THE SKIN: Status: ACTIVE | Noted: 2025-04-09

## 2025-04-09 LAB
ALBUMIN SERPL-MCNC: 3 G/DL (ref 3.5–5.2)
ALBUMIN/GLOB SERPL: 1 G/DL
ALP SERPL-CCNC: 90 U/L (ref 39–117)
ALT SERPL W P-5'-P-CCNC: 19 U/L (ref 1–41)
AMMONIA BLD-SCNC: 31 UMOL/L (ref 16–60)
ANION GAP SERPL CALCULATED.3IONS-SCNC: 8.4 MMOL/L (ref 5–15)
AST SERPL-CCNC: 41 U/L (ref 1–40)
BACTERIA SPEC AEROBE CULT: NO GROWTH
BASOPHILS # BLD AUTO: 0.04 10*3/MM3 (ref 0–0.2)
BASOPHILS NFR BLD AUTO: 0.4 % (ref 0–1.5)
BILIRUB SERPL-MCNC: 2.2 MG/DL (ref 0–1.2)
BUN SERPL-MCNC: 12 MG/DL (ref 8–23)
BUN/CREAT SERPL: 14.3 (ref 7–25)
CALCIUM SPEC-SCNC: 8.5 MG/DL (ref 8.6–10.5)
CHLORIDE SERPL-SCNC: 101 MMOL/L (ref 98–107)
CO2 SERPL-SCNC: 23.6 MMOL/L (ref 22–29)
CREAT SERPL-MCNC: 0.84 MG/DL (ref 0.76–1.27)
D-LACTATE SERPL-SCNC: 1.6 MMOL/L (ref 0.5–2)
D-LACTATE SERPL-SCNC: 1.8 MMOL/L (ref 0.5–2)
D-LACTATE SERPL-SCNC: 2.2 MMOL/L (ref 0.5–2)
D-LACTATE SERPL-SCNC: 2.3 MMOL/L (ref 0.5–2)
DEPRECATED RDW RBC AUTO: 48.7 FL (ref 37–54)
EGFRCR SERPLBLD CKD-EPI 2021: 98 ML/MIN/1.73
EOSINOPHIL # BLD AUTO: 0.11 10*3/MM3 (ref 0–0.4)
EOSINOPHIL NFR BLD AUTO: 1 % (ref 0.3–6.2)
ERYTHROCYTE [DISTWIDTH] IN BLOOD BY AUTOMATED COUNT: 14.5 % (ref 12.3–15.4)
GLOBULIN UR ELPH-MCNC: 3.1 GM/DL
GLUCOSE SERPL-MCNC: 123 MG/DL (ref 65–99)
HCT VFR BLD AUTO: 38.4 % (ref 37.5–51)
HGB BLD-MCNC: 12.2 G/DL (ref 13–17.7)
IMM GRANULOCYTES # BLD AUTO: 0.03 10*3/MM3 (ref 0–0.05)
IMM GRANULOCYTES NFR BLD AUTO: 0.3 % (ref 0–0.5)
LYMPHOCYTES # BLD AUTO: 0.92 10*3/MM3 (ref 0.7–3.1)
LYMPHOCYTES NFR BLD AUTO: 8.3 % (ref 19.6–45.3)
MAGNESIUM SERPL-MCNC: 1.6 MG/DL (ref 1.6–2.4)
MCH RBC QN AUTO: 29.3 PG (ref 26.6–33)
MCHC RBC AUTO-ENTMCNC: 31.8 G/DL (ref 31.5–35.7)
MCV RBC AUTO: 92.1 FL (ref 79–97)
MONOCYTES # BLD AUTO: 0.57 10*3/MM3 (ref 0.1–0.9)
MONOCYTES NFR BLD AUTO: 5.1 % (ref 5–12)
NEUTROPHILS NFR BLD AUTO: 84.9 % (ref 42.7–76)
NEUTROPHILS NFR BLD AUTO: 9.43 10*3/MM3 (ref 1.7–7)
NRBC BLD AUTO-RTO: 0 /100 WBC (ref 0–0.2)
PHOSPHATE SERPL-MCNC: 3.4 MG/DL (ref 2.5–4.5)
PLATELET # BLD AUTO: 167 10*3/MM3 (ref 140–450)
PMV BLD AUTO: 9.3 FL (ref 6–12)
POTASSIUM SERPL-SCNC: 4.3 MMOL/L (ref 3.5–5.2)
PROCALCITONIN SERPL-MCNC: 23.8 NG/ML (ref 0–0.25)
PROT SERPL-MCNC: 6.1 G/DL (ref 6–8.5)
QT INTERVAL: 287 MS
QTC INTERVAL: 423 MS
RBC # BLD AUTO: 4.17 10*6/MM3 (ref 4.14–5.8)
SODIUM SERPL-SCNC: 133 MMOL/L (ref 136–145)
TSH SERPL DL<=0.05 MIU/L-ACNC: 1.46 UIU/ML (ref 0.27–4.2)
WBC NRBC COR # BLD AUTO: 11.1 10*3/MM3 (ref 3.4–10.8)

## 2025-04-09 PROCEDURE — 25010000002 CEFTRIAXONE PER 250 MG: Performed by: STUDENT IN AN ORGANIZED HEALTH CARE EDUCATION/TRAINING PROGRAM

## 2025-04-09 PROCEDURE — 94799 UNLISTED PULMONARY SVC/PX: CPT

## 2025-04-09 PROCEDURE — 25010000002 CEFEPIME PER 500 MG: Performed by: INTERNAL MEDICINE

## 2025-04-09 PROCEDURE — 84100 ASSAY OF PHOSPHORUS: CPT | Performed by: STUDENT IN AN ORGANIZED HEALTH CARE EDUCATION/TRAINING PROGRAM

## 2025-04-09 PROCEDURE — 76705 ECHO EXAM OF ABDOMEN: CPT

## 2025-04-09 PROCEDURE — 0TC78ZZ EXTIRPATION OF MATTER FROM LEFT URETER, VIA NATURAL OR ARTIFICIAL OPENING ENDOSCOPIC: ICD-10-PCS | Performed by: UROLOGY

## 2025-04-09 PROCEDURE — 84443 ASSAY THYROID STIM HORMONE: CPT | Performed by: STUDENT IN AN ORGANIZED HEALTH CARE EDUCATION/TRAINING PROGRAM

## 2025-04-09 PROCEDURE — 94761 N-INVAS EAR/PLS OXIMETRY MLT: CPT

## 2025-04-09 PROCEDURE — 94640 AIRWAY INHALATION TREATMENT: CPT

## 2025-04-09 PROCEDURE — 83605 ASSAY OF LACTIC ACID: CPT

## 2025-04-09 PROCEDURE — 25010000002 ONDANSETRON PER 1 MG: Performed by: NURSE ANESTHETIST, CERTIFIED REGISTERED

## 2025-04-09 PROCEDURE — 84145 PROCALCITONIN (PCT): CPT | Performed by: INTERNAL MEDICINE

## 2025-04-09 PROCEDURE — 82140 ASSAY OF AMMONIA: CPT | Performed by: STUDENT IN AN ORGANIZED HEALTH CARE EDUCATION/TRAINING PROGRAM

## 2025-04-09 PROCEDURE — 94664 DEMO&/EVAL PT USE INHALER: CPT

## 2025-04-09 PROCEDURE — 0T778DZ DILATION OF LEFT URETER WITH INTRALUMINAL DEVICE, VIA NATURAL OR ARTIFICIAL OPENING ENDOSCOPIC: ICD-10-PCS | Performed by: UROLOGY

## 2025-04-09 PROCEDURE — 76000 FLUOROSCOPY <1 HR PHYS/QHP: CPT

## 2025-04-09 PROCEDURE — 25010000002 DIPHENHYDRAMINE PER 50 MG: Performed by: STUDENT IN AN ORGANIZED HEALTH CARE EDUCATION/TRAINING PROGRAM

## 2025-04-09 PROCEDURE — 83605 ASSAY OF LACTIC ACID: CPT | Performed by: INTERNAL MEDICINE

## 2025-04-09 PROCEDURE — 25010000002 LIDOCAINE PF 2% 2 % SOLUTION: Performed by: NURSE ANESTHETIST, CERTIFIED REGISTERED

## 2025-04-09 PROCEDURE — 74018 RADEX ABDOMEN 1 VIEW: CPT

## 2025-04-09 PROCEDURE — 71045 X-RAY EXAM CHEST 1 VIEW: CPT

## 2025-04-09 PROCEDURE — 25010000002 DEXAMETHASONE PER 1 MG: Performed by: NURSE ANESTHETIST, CERTIFIED REGISTERED

## 2025-04-09 PROCEDURE — C2617 STENT, NON-COR, TEM W/O DEL: HCPCS | Performed by: UROLOGY

## 2025-04-09 PROCEDURE — P9047 ALBUMIN (HUMAN), 25%, 50ML: HCPCS | Performed by: STUDENT IN AN ORGANIZED HEALTH CARE EDUCATION/TRAINING PROGRAM

## 2025-04-09 PROCEDURE — C1769 GUIDE WIRE: HCPCS | Performed by: UROLOGY

## 2025-04-09 PROCEDURE — 85025 COMPLETE CBC W/AUTO DIFF WBC: CPT | Performed by: STUDENT IN AN ORGANIZED HEALTH CARE EDUCATION/TRAINING PROGRAM

## 2025-04-09 PROCEDURE — 83735 ASSAY OF MAGNESIUM: CPT | Performed by: STUDENT IN AN ORGANIZED HEALTH CARE EDUCATION/TRAINING PROGRAM

## 2025-04-09 PROCEDURE — 25010000002 FAMOTIDINE 10 MG/ML SOLUTION: Performed by: NURSE PRACTITIONER

## 2025-04-09 PROCEDURE — 70450 CT HEAD/BRAIN W/O DYE: CPT

## 2025-04-09 PROCEDURE — 25810000003 SODIUM CHLORIDE 0.9 % SOLUTION: Performed by: STUDENT IN AN ORGANIZED HEALTH CARE EDUCATION/TRAINING PROGRAM

## 2025-04-09 PROCEDURE — 25010000002 ALBUMIN HUMAN 25% PER 50 ML: Performed by: STUDENT IN AN ORGANIZED HEALTH CARE EDUCATION/TRAINING PROGRAM

## 2025-04-09 PROCEDURE — C1758 CATHETER, URETERAL: HCPCS | Performed by: UROLOGY

## 2025-04-09 PROCEDURE — 80053 COMPREHEN METABOLIC PANEL: CPT | Performed by: STUDENT IN AN ORGANIZED HEALTH CARE EDUCATION/TRAINING PROGRAM

## 2025-04-09 PROCEDURE — 25010000002 FENTANYL CITRATE (PF) 100 MCG/2ML SOLUTION: Performed by: NURSE ANESTHETIST, CERTIFIED REGISTERED

## 2025-04-09 PROCEDURE — 99231 SBSQ HOSP IP/OBS SF/LOW 25: CPT | Performed by: NURSE PRACTITIONER

## 2025-04-09 PROCEDURE — 25010000002 PROPOFOL 10 MG/ML EMULSION: Performed by: NURSE ANESTHETIST, CERTIFIED REGISTERED

## 2025-04-09 DEVICE — URETERAL STENT
Type: IMPLANTABLE DEVICE | Site: URETER | Status: FUNCTIONAL
Brand: PERCUFLEX™ PLUS

## 2025-04-09 RX ORDER — ACETAMINOPHEN 650 MG/1
650 SUPPOSITORY RECTAL EVERY 4 HOURS PRN
Status: DISCONTINUED | OUTPATIENT
Start: 2025-04-09 | End: 2025-04-09

## 2025-04-09 RX ORDER — ACETAMINOPHEN 325 MG/1
650 TABLET ORAL ONCE AS NEEDED
Status: DISCONTINUED | OUTPATIENT
Start: 2025-04-09 | End: 2025-04-09

## 2025-04-09 RX ORDER — LIDOCAINE HYDROCHLORIDE 20 MG/ML
INJECTION, SOLUTION EPIDURAL; INFILTRATION; INTRACAUDAL; PERINEURAL AS NEEDED
Status: DISCONTINUED | OUTPATIENT
Start: 2025-04-09 | End: 2025-04-09 | Stop reason: SURG

## 2025-04-09 RX ORDER — FENTANYL CITRATE 50 UG/ML
25 INJECTION, SOLUTION INTRAMUSCULAR; INTRAVENOUS
Status: DISCONTINUED | OUTPATIENT
Start: 2025-04-09 | End: 2025-04-09

## 2025-04-09 RX ORDER — ACETAMINOPHEN 650 MG/1
650 SUPPOSITORY RECTAL EVERY 8 HOURS PRN
Status: DISCONTINUED | OUTPATIENT
Start: 2025-04-09 | End: 2025-04-10 | Stop reason: HOSPADM

## 2025-04-09 RX ORDER — DEXAMETHASONE SODIUM PHOSPHATE 4 MG/ML
INJECTION, SOLUTION INTRA-ARTICULAR; INTRALESIONAL; INTRAMUSCULAR; INTRAVENOUS; SOFT TISSUE AS NEEDED
Status: DISCONTINUED | OUTPATIENT
Start: 2025-04-09 | End: 2025-04-09 | Stop reason: SURG

## 2025-04-09 RX ORDER — ASPIRIN 81 MG/1
81 TABLET, CHEWABLE ORAL DAILY
Status: DISCONTINUED | OUTPATIENT
Start: 2025-04-09 | End: 2025-04-10 | Stop reason: HOSPADM

## 2025-04-09 RX ORDER — HYDROCODONE BITARTRATE AND ACETAMINOPHEN 5; 325 MG/1; MG/1
1 TABLET ORAL EVERY 8 HOURS PRN
COMMUNITY
End: 2025-04-10 | Stop reason: HOSPADM

## 2025-04-09 RX ORDER — OXYCODONE HYDROCHLORIDE 5 MG/1
7.5 TABLET ORAL ONCE AS NEEDED
Status: DISCONTINUED | OUTPATIENT
Start: 2025-04-09 | End: 2025-04-09

## 2025-04-09 RX ORDER — FENTANYL CITRATE 50 UG/ML
50 INJECTION, SOLUTION INTRAMUSCULAR; INTRAVENOUS
Status: DISCONTINUED | OUTPATIENT
Start: 2025-04-09 | End: 2025-04-09

## 2025-04-09 RX ORDER — ONDANSETRON 2 MG/ML
4 INJECTION INTRAMUSCULAR; INTRAVENOUS ONCE AS NEEDED
Status: DISCONTINUED | OUTPATIENT
Start: 2025-04-09 | End: 2025-04-09

## 2025-04-09 RX ORDER — DIPHENHYDRAMINE HYDROCHLORIDE 50 MG/ML
12.5 INJECTION, SOLUTION INTRAMUSCULAR; INTRAVENOUS
Status: DISCONTINUED | OUTPATIENT
Start: 2025-04-09 | End: 2025-04-09

## 2025-04-09 RX ORDER — BUDESONIDE 0.5 MG/2ML
0.5 INHALANT ORAL
Status: DISCONTINUED | OUTPATIENT
Start: 2025-04-09 | End: 2025-04-10 | Stop reason: HOSPADM

## 2025-04-09 RX ORDER — ONDANSETRON 2 MG/ML
INJECTION INTRAMUSCULAR; INTRAVENOUS AS NEEDED
Status: DISCONTINUED | OUTPATIENT
Start: 2025-04-09 | End: 2025-04-09 | Stop reason: SURG

## 2025-04-09 RX ORDER — ALBUTEROL SULFATE 0.83 MG/ML
2.5 SOLUTION RESPIRATORY (INHALATION) ONCE AS NEEDED
Status: DISCONTINUED | OUTPATIENT
Start: 2025-04-09 | End: 2025-04-09

## 2025-04-09 RX ORDER — FENTANYL 25 UG/1
1 PATCH TRANSDERMAL
COMMUNITY
End: 2025-04-10 | Stop reason: HOSPADM

## 2025-04-09 RX ORDER — FENTANYL CITRATE 50 UG/ML
INJECTION, SOLUTION INTRAMUSCULAR; INTRAVENOUS AS NEEDED
Status: DISCONTINUED | OUTPATIENT
Start: 2025-04-09 | End: 2025-04-09 | Stop reason: SURG

## 2025-04-09 RX ORDER — LABETALOL HYDROCHLORIDE 5 MG/ML
5 INJECTION, SOLUTION INTRAVENOUS
Status: DISCONTINUED | OUTPATIENT
Start: 2025-04-09 | End: 2025-04-09

## 2025-04-09 RX ORDER — ALBUTEROL SULFATE 90 UG/1
INHALANT RESPIRATORY (INHALATION) AS NEEDED
Status: DISCONTINUED | OUTPATIENT
Start: 2025-04-09 | End: 2025-04-09 | Stop reason: SURG

## 2025-04-09 RX ORDER — PROPOFOL 10 MG/ML
VIAL (ML) INTRAVENOUS AS NEEDED
Status: DISCONTINUED | OUTPATIENT
Start: 2025-04-09 | End: 2025-04-09 | Stop reason: SURG

## 2025-04-09 RX ORDER — CALCIUM CARBONATE 500 MG/1
1 TABLET, CHEWABLE ORAL 3 TIMES DAILY PRN
Status: DISCONTINUED | OUTPATIENT
Start: 2025-04-09 | End: 2025-04-10 | Stop reason: HOSPADM

## 2025-04-09 RX ORDER — FAMOTIDINE 10 MG/ML
20 INJECTION, SOLUTION INTRAVENOUS EVERY 12 HOURS SCHEDULED
Status: DISCONTINUED | OUTPATIENT
Start: 2025-04-09 | End: 2025-04-10 | Stop reason: HOSPADM

## 2025-04-09 RX ORDER — PHENYLEPHRINE HCL IN 0.9% NACL 1 MG/10 ML
SYRINGE (ML) INTRAVENOUS AS NEEDED
Status: DISCONTINUED | OUTPATIENT
Start: 2025-04-09 | End: 2025-04-09 | Stop reason: SURG

## 2025-04-09 RX ORDER — BUDESONIDE 0.5 MG/2ML
0.5 INHALANT ORAL ONCE
Status: COMPLETED | OUTPATIENT
Start: 2025-04-09 | End: 2025-04-09

## 2025-04-09 RX ORDER — HYDRALAZINE HYDROCHLORIDE 20 MG/ML
5 INJECTION INTRAMUSCULAR; INTRAVENOUS
Status: DISCONTINUED | OUTPATIENT
Start: 2025-04-09 | End: 2025-04-09

## 2025-04-09 RX ORDER — ALBUMIN (HUMAN) 12.5 G/50ML
12.5 SOLUTION INTRAVENOUS ONCE
Status: COMPLETED | OUTPATIENT
Start: 2025-04-09 | End: 2025-04-09

## 2025-04-09 RX ORDER — ACETAMINOPHEN 650 MG/1
650 SUPPOSITORY RECTAL ONCE
Status: DISCONTINUED | OUTPATIENT
Start: 2025-04-09 | End: 2025-04-09

## 2025-04-09 RX ORDER — NALOXONE HCL 0.4 MG/ML
0.4 VIAL (ML) INJECTION AS NEEDED
Status: DISCONTINUED | OUTPATIENT
Start: 2025-04-09 | End: 2025-04-09

## 2025-04-09 RX ADMIN — FAMOTIDINE 20 MG: 10 INJECTION INTRAVENOUS at 20:29

## 2025-04-09 RX ADMIN — ALBUTEROL SULFATE 8 PUFF: 108 AEROSOL, METERED RESPIRATORY (INHALATION) at 16:40

## 2025-04-09 RX ADMIN — FENTANYL CITRATE 50 MCG: 50 INJECTION, SOLUTION INTRAMUSCULAR; INTRAVENOUS at 16:40

## 2025-04-09 RX ADMIN — NYSTATIN: 100000 POWDER TOPICAL at 10:04

## 2025-04-09 RX ADMIN — Medication 100 MCG: at 16:36

## 2025-04-09 RX ADMIN — DEXAMETHASONE SODIUM PHOSPHATE 4 MG: 4 INJECTION, SOLUTION INTRAMUSCULAR; INTRAVENOUS at 16:40

## 2025-04-09 RX ADMIN — BUDESONIDE 0.5 MG: 0.5 INHALANT RESPIRATORY (INHALATION) at 18:58

## 2025-04-09 RX ADMIN — Medication 10 ML: at 00:35

## 2025-04-09 RX ADMIN — CEFEPIME 2000 MG: 2 INJECTION, POWDER, FOR SOLUTION INTRAVENOUS at 17:12

## 2025-04-09 RX ADMIN — CEFTRIAXONE 2000 MG: 2 INJECTION, POWDER, FOR SOLUTION INTRAMUSCULAR; INTRAVENOUS at 04:03

## 2025-04-09 RX ADMIN — PROPOFOL 200 MG: 10 INJECTION, EMULSION INTRAVENOUS at 16:29

## 2025-04-09 RX ADMIN — IPRATROPIUM BROMIDE 0.5 MG: 0.5 SOLUTION RESPIRATORY (INHALATION) at 03:49

## 2025-04-09 RX ADMIN — IPRATROPIUM BROMIDE 0.5 MG: 0.5 SOLUTION RESPIRATORY (INHALATION) at 07:00

## 2025-04-09 RX ADMIN — LACTULOSE 20 G: 20 SOLUTION ORAL at 18:25

## 2025-04-09 RX ADMIN — ALBUMIN (HUMAN) 12.5 G: 0.25 INJECTION, SOLUTION INTRAVENOUS at 08:43

## 2025-04-09 RX ADMIN — DIPHENHYDRAMINE HYDROCHLORIDE 25 MG: 50 INJECTION, SOLUTION INTRAMUSCULAR; INTRAVENOUS at 00:34

## 2025-04-09 RX ADMIN — Medication 10 ML: at 20:12

## 2025-04-09 RX ADMIN — NYSTATIN: 100000 POWDER TOPICAL at 00:42

## 2025-04-09 RX ADMIN — LACTULOSE 20 G: 20 SOLUTION ORAL at 10:03

## 2025-04-09 RX ADMIN — BUDESONIDE 0.5 MG: 0.5 INHALANT RESPIRATORY (INHALATION) at 07:05

## 2025-04-09 RX ADMIN — SODIUM CHLORIDE 100 ML/HR: 9 INJECTION, SOLUTION INTRAVENOUS at 00:37

## 2025-04-09 RX ADMIN — ANTACID TABLETS 1 TABLET: 500 TABLET, CHEWABLE ORAL at 20:12

## 2025-04-09 RX ADMIN — BUDESONIDE 0.5 MG: 0.5 SUSPENSION RESPIRATORY (INHALATION) at 03:54

## 2025-04-09 RX ADMIN — FENTANYL CITRATE 50 MCG: 50 INJECTION, SOLUTION INTRAMUSCULAR; INTRAVENOUS at 16:46

## 2025-04-09 RX ADMIN — IPRATROPIUM BROMIDE 0.5 MG: 0.5 SOLUTION RESPIRATORY (INHALATION) at 18:54

## 2025-04-09 RX ADMIN — Medication 10 ML: at 10:04

## 2025-04-09 RX ADMIN — LIDOCAINE HYDROCHLORIDE 80 MG: 20 INJECTION, SOLUTION EPIDURAL; INFILTRATION; INTRACAUDAL; PERINEURAL at 16:29

## 2025-04-09 RX ADMIN — NYSTATIN: 100000 POWDER TOPICAL at 20:12

## 2025-04-09 RX ADMIN — ONDANSETRON 4 MG: 2 INJECTION, SOLUTION INTRAMUSCULAR; INTRAVENOUS at 16:40

## 2025-04-09 RX ADMIN — CEFEPIME 2000 MG: 2 INJECTION, POWDER, FOR SOLUTION INTRAVENOUS at 10:03

## 2025-04-09 NOTE — PLAN OF CARE
Goal Outcome Evaluation:      New admit from the ED. NG tube placed at 65cm. Q2 turns. No complaints of pain on this shift. Personal items and call light in reach. Plan of care ongoing.

## 2025-04-09 NOTE — ED PROVIDER NOTES
Subjective   History of Present Illness  Below is from EMS nursing home patient is a stroke difficulty speaking altered mental status    History of present is a 64-year-old male has had a previous stroke with muscle paralysis who was noted to have some altered mental status today cough and congestion some tremor and he was sent to the ER.  Patient had a fentanyl patch on the nursing home gave Narcan with no change.  Patient is unable to give any history he will speak some but he is very confused.  He has no tremor currently.      Review of Systems   Unable to perform ROS: Mental status change       Past Medical History:   Diagnosis Date    Elevated cholesterol     GERD (gastroesophageal reflux disease)     Stroke        No Known Allergies    Past Surgical History:   Procedure Laterality Date    ENDOSCOPY N/A 8/27/2024    Procedure: ESOPHAGOGASTRODUODENOSCOPY with biopsy x 3 areas;  Surgeon: KG Arizmendi MD;  Location: [facility] ENDOSCOPY;  Service: Gastroenterology;  Laterality: N/A;  post op:       No family history on file.    Social History     Socioeconomic History    Marital status: Single   Tobacco Use    Smoking status: Never    Smokeless tobacco: Former     Types: Snuff   Vaping Use    Vaping status: Never Used   Substance and Sexual Activity    Alcohol use: Not Currently     Comment: two bottles of liquor once a month    Drug use: No    Sexual activity: Defer     Partners: Female     Prior to Admission medications    Medication Sig Start Date End Date Taking? Authorizing Provider   aluminum-magnesium hydroxide-simethicone (MAALOX/MYLANTA) 200-200-20 MG/5ML suspension Take 30 mL by mouth Every 8 (Eight) Hours As Needed for Indigestion or Heartburn.    Provider, MD Jose L   amoxicillin-clavulanate (AUGMENTIN) 875-125 MG per tablet Take 1 tablet by mouth 2 (Two) Times a Day. 8/28/24   Stanford Kiran MD   aspirin 81 MG chewable tablet Chew 1 tablet Every Morning.    ProviderJose L MD    atorvastatin (LIPITOR) 40 MG tablet Take 1 tablet by mouth Every Night.    Jose L Ferrer MD   baclofen (LIORESAL) 10 MG tablet Take 1 tablet by mouth 3 (Three) Times a Day.    Jose L Ferrer MD   calcium carbonate (TUMS) 500 MG chewable tablet Chew 1 tablet 3 (Three) Times a Day.    Jose L Ferrer MD   carboxymethylcellulose (REFRESH PLUS) 0.5 % solution Administer 2 drops to both eyes 2 (Two) Times a Day.    Jose L Ferrer MD   ezetimibe (ZETIA) 10 MG tablet Take 1 tablet by mouth Every Morning.    Jose L Ferrer MD   famotidine (PEPCID) 20 MG tablet Take 1 tablet by mouth 2 (Two) Times a Day.    Jose L Ferrer MD   gabapentin (NEURONTIN) 400 MG capsule Take 1 capsule by mouth 3 (Three) Times a Day.    Jose L Ferrer MD   HYDROcodone-acetaminophen (NORCO)  MG per tablet Take 1 tablet by mouth 3 (Three) Times a Day. 8/28/24   Stanford Kiran MD   ketoconazole (NIZORAL) 2 % shampoo Apply 1 Application topically to the appropriate area as directed 1 (One) Time Per Week. Apply to scalp topically on dayshift every Wednesday, Saturday for tinea versicolor for 6 months    Jose L Ferrer MD   Lidocaine 4 % aerosol Place 1 Application on the skin as directed by provider 2 (Two) Times a Day. Apply to R 4th toe distal topically every morning and at bedtime for pain    Jose L Ferrer MD   meclizine (ANTIVERT) 25 MG tablet Take 1 tablet by mouth Every 8 (Eight) Hours As Needed for Dizziness.    Jose L Ferrer MD   melatonin 1 MG tablet Take 3 tablets by mouth Every Night.    Jose L Ferrer MD   naloxone (NARCAN) 4 MG/0.1ML nasal spray Call 911. Don't prime. Gravity in 1 nostril for overdose. Repeat in 2-3 minutes in other nostril if no or minimal breathing/responsiveness. 8/28/24   Stanford Kiran MD   ondansetron (ZOFRAN) 4 MG tablet Take 1 tablet by mouth Every 6 (Six) Hours As Needed for Nausea or Vomiting.    Jose L Ferrer  MD   pantoprazole (Protonix) 40 MG EC tablet Take 1 tablet by mouth 2 (Two) Times a Day. 8/28/24   Stanford Kiran MD   Scopolamine 1 MG/3DAYS patch Place 1 patch on the skin as directed by provider Every 72 (Seventy-Two) Hours.    Jose L Ferrer MD   Skin Protectants, Misc. (Eucerin) cream Apply 1 Application topically to the appropriate area as directed 2 (Two) Times a Day. Apply to bilateral arms topically every shift for eczema    Jose L Ferrer MD   tamsulosin (FLOMAX) 0.4 MG capsule 24 hr capsule Take 1 capsule by mouth Every Morning.    Jose L Ferrer MD   white petrolatum ointment Apply 1 Application topically to the appropriate area as directed every night at bedtime. Instill 1 application in both eyes at bedtime for eye maintenance 1/2 inch ribbon outer lid    Jose L Ferrer MD            Objective   Physical Exam  Constitutional 64-year-old male sleepy but arousable he will open his eyes he will mumble some words.  Temperature 103 tachycardic at 130.  Sats about 93% on couple liters of oxygen.  Patient is a lot of rhonchi throughout..  HEENT pupils equal round reactive extraocular muscles intact, mouth was dry neck is supple no adenopathy no JVD no bruits lungs diffuse rhonchi throughout no retractions heart is regular tachycardic without murmur and was soft that tenderness good bowel sounds no pulsatile masses extremities symptoms pulses are equal upper and lower extremities no edema cords or Homans' sign or evidence of DVTs got contractures on the left side.  Skin is otherwise warm and dry patient has a erythematous rash noted up across the trunk he is got a wound bedsore in the left buttock but no significant surrounding erythema or fluctuance or crepitus or subcutaneous air no evidence of necrotizing fasciitis neurologic Sleepy but he is arousable he does follow commands chronic weakness to the left side from previous stroke strength okay on the right side he is able  to move his toes and squeeze my hand  Procedures           ED Course      Results for orders placed or performed during the hospital encounter of 04/08/25   POC Glucose Once    Collection Time: 04/08/25  6:43 PM    Specimen: Blood   Result Value Ref Range    Glucose 101 70 - 105 mg/dL   ECG 12 Lead Stroke Evaluation    Collection Time: 04/08/25  6:47 PM   Result Value Ref Range    QT Interval 287 ms    QTC Interval 423 ms   Comprehensive Metabolic Panel    Collection Time: 04/08/25  7:15 PM    Specimen: Blood   Result Value Ref Range    Glucose 117 (H) 65 - 99 mg/dL    BUN 10 8 - 23 mg/dL    Creatinine 1.01 0.76 - 1.27 mg/dL    Sodium 137 136 - 145 mmol/L    Potassium 3.9 3.5 - 5.2 mmol/L    Chloride 98 98 - 107 mmol/L    CO2 26.4 22.0 - 29.0 mmol/L    Calcium 9.4 8.6 - 10.5 mg/dL    Total Protein 7.4 6.0 - 8.5 g/dL    Albumin 3.7 3.5 - 5.2 g/dL    ALT (SGPT) 10 1 - 41 U/L    AST (SGOT) 33 1 - 40 U/L    Alkaline Phosphatase 121 (H) 39 - 117 U/L    Total Bilirubin 1.5 (H) 0.0 - 1.2 mg/dL    Globulin 3.7 gm/dL    A/G Ratio 1.0 g/dL    BUN/Creatinine Ratio 9.9 7.0 - 25.0    Anion Gap 12.6 5.0 - 15.0 mmol/L    eGFR 83.6 >60.0 mL/min/1.73   Magnesium    Collection Time: 04/08/25  7:15 PM    Specimen: Blood   Result Value Ref Range    Magnesium 1.8 1.6 - 2.4 mg/dL   TSH Rfx On Abnormal To Free T4    Collection Time: 04/08/25  7:15 PM    Specimen: Blood   Result Value Ref Range    TSH 2.220 0.270 - 4.200 uIU/mL   CBC Auto Differential    Collection Time: 04/08/25  7:15 PM    Specimen: Blood   Result Value Ref Range    WBC 4.88 3.40 - 10.80 10*3/mm3    RBC 4.79 4.14 - 5.80 10*6/mm3    Hemoglobin 13.9 13.0 - 17.7 g/dL    Hematocrit 45.0 37.5 - 51.0 %    MCV 93.9 79.0 - 97.0 fL    MCH 29.0 26.6 - 33.0 pg    MCHC 30.9 (L) 31.5 - 35.7 g/dL    RDW 14.1 12.3 - 15.4 %    RDW-SD 49.2 37.0 - 54.0 fl    MPV 9.4 6.0 - 12.0 fL    Platelets 189 140 - 450 10*3/mm3    Neutrophil % 85.6 (H) 42.7 - 76.0 %    Lymphocyte % 10.5 (L) 19.6 -  45.3 %    Monocyte % 2.9 (L) 5.0 - 12.0 %    Eosinophil % 0.4 0.3 - 6.2 %    Basophil % 0.4 0.0 - 1.5 %    Immature Grans % 0.2 0.0 - 0.5 %    Neutrophils, Absolute 4.18 1.70 - 7.00 10*3/mm3    Lymphocytes, Absolute 0.51 (L) 0.70 - 3.10 10*3/mm3    Monocytes, Absolute 0.14 0.10 - 0.90 10*3/mm3    Eosinophils, Absolute 0.02 0.00 - 0.40 10*3/mm3    Basophils, Absolute 0.02 0.00 - 0.20 10*3/mm3    Immature Grans, Absolute 0.01 0.00 - 0.05 10*3/mm3    nRBC 0.0 0.0 - 0.2 /100 WBC   Gold Top - SST    Collection Time: 04/08/25  7:15 PM   Result Value Ref Range    Extra Tube Hold for add-ons.    Light Blue Top    Collection Time: 04/08/25  7:15 PM   Result Value Ref Range    Extra Tube Hold for add-ons.    Respiratory Panel PCR w/COVID-19(SARS-CoV-2) KEON/THIAGO/MJ/PAD/COR/BIGG In-House, NP Swab in UTM/VTM, 2 HR TAT - Swab, Nasopharynx    Collection Time: 04/08/25  7:33 PM    Specimen: Nasopharynx; Swab   Result Value Ref Range    ADENOVIRUS, PCR Not Detected Not Detected    Coronavirus 229E Not Detected Not Detected    Coronavirus HKU1 Not Detected Not Detected    Coronavirus NL63 Not Detected Not Detected    Coronavirus OC43 Not Detected Not Detected    COVID19 Not Detected Not Detected - Ref. Range    Human Metapneumovirus Not Detected Not Detected    Human Rhinovirus/Enterovirus Not Detected Not Detected    Influenza A PCR Not Detected Not Detected    Influenza B PCR Not Detected Not Detected    Parainfluenza Virus 1 Not Detected Not Detected    Parainfluenza Virus 2 Not Detected Not Detected    Parainfluenza Virus 3 Not Detected Not Detected    Parainfluenza Virus 4 Not Detected Not Detected    RSV, PCR Not Detected Not Detected    Bordetella pertussis pcr Not Detected Not Detected    Bordetella parapertussis PCR Not Detected Not Detected    Chlamydophila pneumoniae PCR Not Detected Not Detected    Mycoplasma pneumo by PCR Not Detected Not Detected   Urinalysis With Culture If Indicated - Straight Cath    Collection Time:  04/08/25  7:33 PM    Specimen: Straight Cath; Urine   Result Value Ref Range    Color, UA Dark Yellow (A) Yellow, Straw    Appearance, UA Hazy (A) Clear    pH, UA <=5.0 5.0 - 8.0    Specific Gravity, UA 1.026 1.005 - 1.030    Glucose, UA Negative Negative    Ketones, UA Trace (A) Negative    Bilirubin, UA Negative Negative    Blood, UA Large (3+) (A) Negative    Protein, UA 30 mg/dL (1+) (A) Negative    Leuk Esterase, UA Small (1+) (A) Negative    Nitrite, UA Negative Negative    Urobilinogen, UA 0.2 E.U./dL 0.2 - 1.0 E.U./dL   Ammonia    Collection Time: 04/08/25  7:33 PM    Specimen: Arm, Left; Blood   Result Value Ref Range    Ammonia 101 (H) 16 - 60 umol/L   Urinalysis, Microscopic Only - Straight Cath    Collection Time: 04/08/25  7:33 PM    Specimen: Straight Cath; Urine   Result Value Ref Range    RBC, UA Too Numerous to Count (A) None Seen, 0-2 /HPF    WBC, UA 6-10 (A) None Seen, 0-2 /HPF    Bacteria, UA Trace (A) None Seen /HPF    Squamous Epithelial Cells, UA 0-2 None Seen, 0-2 /HPF    Transitional Epithelial Cells, UA 0-2 0 - 2 /HPF    Hyaline Casts, UA 0-2 None Seen /LPF    Amorphous Crystals, UA Small/1+ None Seen /HPF    Mucus, UA Small/1+ (A) None Seen, Trace /HPF    Methodology Manual Light Microscopy    POC Lactate    Collection Time: 04/08/25  8:19 PM    Specimen: Blood   Result Value Ref Range    Lactate 2.1 (C) 0.3 - 2.0 mmol/L     XR Chest 1 View  Result Date: 4/8/2025  Impression: No radiographic evidence of acute cardiopulmonary abnormality. Electronically Signed: Louie Crowley  4/8/2025 7:15 PM EDT  Workstation ID: [id number]    Medications   sodium chloride 0.9 % flush 10 mL (has no administration in time range)   lactulose (CHRONULAC) 10 GM/15ML solution 30 g (has no administration in time range)   sodium chloride 0.9 % flush 10 mL (has no administration in time range)   sodium chloride 0.9 % flush 10 mL (has no administration in time range)   sodium chloride 0.9 % infusion 40 mL (has no  administration in time range)   Pharmacy To Dose: Piperacillin-tazobactam (Zosyn) (has no administration in time range)   nitroglycerin (NITROSTAT) SL tablet 0.4 mg (has no administration in time range)   Potassium Replacement - Follow Nurse / BPA Driven Protocol (has no administration in time range)   Magnesium Standard Dose Replacement - Follow Nurse / BPA Driven Protocol (has no administration in time range)   Phosphorus Replacement - Follow Nurse / BPA Driven Protocol (has no administration in time range)   Calcium Replacement - Follow Nurse / BPA Driven Protocol (has no administration in time range)   sennosides-docusate (PERICOLACE) 8.6-50 MG per tablet 2 tablet (has no administration in time range)     And   polyethylene glycol (MIRALAX) packet 17 g (has no administration in time range)     And   bisacodyl (DULCOLAX) EC tablet 5 mg (has no administration in time range)     And   bisacodyl (DULCOLAX) suppository 10 mg (has no administration in time range)   acetaminophen (TYLENOL) suppository 650 mg (650 mg Rectal Given 4/8/25 1934)   piperacillin-tazobactam (ZOSYN) 3.375 g IVPB in 100 mL NS MBP (CD) (3.375 g Intravenous New Bag 4/8/25 2153)       EKG my interpretation normal sinus rhythm tachycardic to 130 patient has PVC evidence of previous inferior infarct QTc of 423 no change from 8/26/2024 other than faster rate                                                 Medical Decision Making  Medical decision making.  Patient IV established monitor placed my review of sinus tachycardia EKG my interpretation normal sinus rhythm tachycardic 130 PVC evidence previous inferior infarct QTc 423 no change from 8/26/2024 other than a faster rate.  Chest x-ray obtained my independent review no evidence of pneumonia or pneumothorax or failure.  Patient triggered sepsis protocol he had labs obtained including cultures and lactate and was started on Zosyn IV.  Pain management review comprehensive metabolic profile  unremarkable magnesium was 1.8 TSH was 2.2 CBC unremarkable ammonia level was 101.  The patient's urinalysis obtained patient had small leukocyte significant red cells 6-10 white cells.  Other labs obtained and reviewed by me lactic acid was 2.1 cultures pending.  The patient repeat exam is resting comfortably he had been given Tylenol for his fever.  His heart rate is down to about 105 from 130 temperature was down blood pressure was good at 110/62.  His abdomen was soft without tenderness good bowel sounds there was no peritoneal findings or pulsatile masses.  He had some rhonchi throughout but sats were in the mid 90s.  Etiology fevers not quite clear I think clinically he has pneumonia.  The sore in his buttock area does not really look to have significant cellulitis I do not see any evidence of necrotizing fasciitis or an abscess although could be a potential source.  Chest x-ray did not show pneumonia but I suspect that he does have this.  Respiratory panel is negative.  I do not see any other skin changes.  I do not see any evidence that suggest an acute intra-abdominal process or meningitic process.  He arouses he will talk some but he does not carry on a conversation he will follow commands..  We will try to start him on some lactulose for this ammonia level.  I do not see a history of cirrhosis anywhere.  Nursing home is not quite send us the medication list yet still waiting on that at this time.  Has been requested.  Records show that he is a DNR.  This is from the nursing home.  I have talked to the hospitalist we discussed the case requested a CT of his abdomen pelvis.  This was requested to look at the liver because of elevated ammonia level.  He will have this on the way to upstairs.  Will attempted to give him some lactulose p.o. if he is unable to do so may need an NG tube or rectal lactulose.  Stable otherwise unremarkable improved ER course.  Lactic acid 2.1 patient does have a history of CHF  will go back and look at his records including echo in the past with a extremely poor EF so no IV fluids was 30 kg IV fluids not given because of this.  Lactate is only 2.1.  No hypotension's been noted.  Stable otherwise unremarkable improved ER course    Problems Addressed:  Acute febrile illness: complicated acute illness or injury  Increased ammonia level: complicated acute illness or injury    Amount and/or Complexity of Data Reviewed  Labs: ordered. Decision-making details documented in ED Course.  Radiology: ordered and independent interpretation performed. Decision-making details documented in ED Course.  ECG/medicine tests: ordered and independent interpretation performed. Decision-making details documented in ED Course.    Risk  OTC drugs.  Prescription drug management.  Decision regarding hospitalization.        Final diagnoses:   Acute febrile illness   Increased ammonia level       ED Disposition  ED Disposition       ED Disposition   Decision to Admit    Condition   --    Comment   Level of Care: Med/Surg [1]   Diagnosis: Altered mental status [780.97.ICD-9-CM]   Admitting Physician: MARCELA SULLIVAN [[id number]]   Certification: I Certify That Inpatient Hospital Services Are Medically Necessary For Greater Than 2 Midnights                 No follow-up provider specified.       Medication List      No changes were made to your prescriptions during this visit.            Pardeep Dillon MD  04/08/25 4000

## 2025-04-09 NOTE — CASE MANAGEMENT/SOCIAL WORK
Discharge Planning Assessment   Ashish     Patient Name: Kevin Hurtado  MRN: [id number]  Today's Date: 4/9/2025    Admit Date: 4/8/2025    Plan: DC PLAN: From [facility] okay to return. Will need transport at [place].       Discharge Needs Assessment       Row Name 04/09/25 1034       Living Environment    People in Home alone    Current Living Arrangements home    Potentially Unsafe Housing Conditions none    In the past 12 months has the electric, gas, oil, or water company threatened to shut off services in your home? No    Primary Care Provided by self    Provides Primary Care For no one    Quality of Family Relationships helpful;involved;supportive    Able to Return to Prior Arrangements yes       Resource/Environmental Concerns    Resource/Environmental Concerns none    Transportation Concerns none       Transportation Needs    In the past 12 months, has lack of transportation kept you from medical appointments or from getting medications? no    In the past 12 months, has lack of transportation kept you from meetings, work, or from getting things needed for daily living? No       Food Insecurity    Within the past 12 months, you worried that your food would run out before you got the money to buy more. Never true    Within the past 12 months, the food you bought just didn't last and you didn't have money to get more. Never true       Transition Planning    Patient/Family Anticipates Transition to home    Patient/Family Anticipated Services at Transition none    Transportation Anticipated car, drives self;family or friend will provide       Discharge Needs Assessment    Readmission Within the Last 30 Days no previous admission in last 30 days    Equipment Currently Used at Home none    Anticipated Changes Related to Illness none    Equipment Needed After Discharge none                   Discharge Plan       Row Name 04/09/25 1127       Plan    Plan DC PLAN: From [facility] okay  to return. Will need transport at DC.    Patient/Family in Agreement with Plan yes    Plan Comments Patient is not from routine home, from [place] [facility] [place] report sent message to liasion. Liasion states patient is okay to return.  Advised from bedside nurse, patient confused.          Row Name 04/09/25 1036                Patient/Family in Agreement with Plan yes    Plan Comments CM met with patient at bedside, from routine home alone. Mostly independent with ADL's no DME. PCP is Mariah, [facility]acy is [facility] Agreeable to use [facility]. Able to afford medications and denies any issues with food or utilities. Denies any transportation issues, family will provide at time of discharge. May be agreeable to SNF or HHC, awaiting PT/OT eval. NPO, NG tube, pending clinical progress.                  Continued Care and Services - Admitted Since 4/8/2025       Destination       Service Provider Request Status Services Address Phone Fax Patient Preferred    [facility] Pending - Request Sent -- [address] [place] [address] [phone] --                  Expected Discharge Date and Time       Expected Discharge Date Expected Discharge Time    Apr 10, 2025            Demographic Summary       Row Name 04/09/25 1032       General Information    Admission Type inpatient    Arrived From emergency department    Required Notices Provided Important Message from Medicare    Referral Source admission list    Reason for Consult discharge planning    Preferred Language English       Contact Information    Permission Granted to Share Info With     Contact Information Obtained for                    Functional Status       Row Name 04/09/25 1032       Functional Status    Usual Activity Tolerance excellent    Current Activity Tolerance excellent       Physical Activity    On average, how many days per week do you engage in moderate to  strenuous exercise (like a brisk walk)? 0 days    On average, how many minutes do you engage in exercise at this level? 0 min    Number of minutes of exercise per week 0       Assessment of Health Literacy    How often do you have someone help you read hospital materials? Sometimes    How often do you have problems learning about your medical condition because of difficulty understanding written information? Sometimes    How often do you have a problem understanding what is told to you about your medical condition? Sometimes    How confident are you filling out medical forms by yourself? Somewhat    Health Literacy Moderate       Functional Status, IADL    Medications independent    Meal Preparation independent    Housekeeping independent    Laundry independent    Shopping independent       Mental Status    General Appearance WDL WDL       Mental Status Summary    Recent Changes in Mental Status/Cognitive Functioning no changes                    Ansley Newton RN   Case Management  [phone]

## 2025-04-09 NOTE — SIGNIFICANT NOTE
04/09/25 1316   PASRR   PASRR Status Return  ([facility] [place])

## 2025-04-09 NOTE — CONSULTS
FIRST UROLOGY CONSULT      Patient Identification:  NAME:  Kevin Hurtado  Age:  64 y.o.   Sex:  male   :  1960   MRN:  [id number]     Chief complaint: ams    History of present illness:      From NH, new to [facility] urology. Here with AMS found to have intermittent obstructive stone. Sepsis and uti    In hospital:   .A large stone in the left renal pelvis measuring up to 11 mm diameter. There is mild left-sided hydronephrosis and there is moderate peripelvic fat stranding at the left kidney. This could be related to intermittent obstruction or urinary tract   Asked to see    Past medical history:  Past Medical History:   Diagnosis Date    Elevated cholesterol     GERD (gastroesophageal reflux disease)     Stroke        Past surgical history:  Past Surgical History:   Procedure Laterality Date    ENDOSCOPY N/A 2024    Procedure: ESOPHAGOGASTRODUODENOSCOPY with biopsy x 3 areas;  Surgeon: KG Arizmendi MD;  Location: [facility] ENDOSCOPY;  Service: Gastroenterology;  Laterality: N/A;  post op:       Allergies:  Patient has no known allergies.    Home medications:  Medications Prior to Admission   Medication Sig Dispense Refill Last Dose/Taking    aluminum-magnesium hydroxide-simethicone (MAALOX/MYLANTA) 200-200-20 MG/5ML suspension Take 30 mL by mouth Every 8 (Eight) Hours As Needed for Indigestion or Heartburn.       amoxicillin-clavulanate (AUGMENTIN) 875-125 MG per tablet Take 1 tablet by mouth 2 (Two) Times a Day. 14 tablet 0     aspirin 81 MG chewable tablet Chew 1 tablet Every Morning.       atorvastatin (LIPITOR) 40 MG tablet Take 1 tablet by mouth Every Night.       baclofen (LIORESAL) 10 MG tablet Take 1 tablet by mouth 3 (Three) Times a Day.       calcium carbonate (TUMS) 500 MG chewable tablet Chew 1 tablet 3 (Three) Times a Day.       carboxymethylcellulose (REFRESH PLUS) 0.5 % solution Administer 2 drops to both eyes 2 (Two) Times a Day.       ezetimibe (ZETIA) 10 MG tablet Take 1 tablet  by mouth Every Morning.       famotidine (PEPCID) 20 MG tablet Take 1 tablet by mouth 2 (Two) Times a Day.       gabapentin (NEURONTIN) 400 MG capsule Take 1 capsule by mouth 3 (Three) Times a Day.       HYDROcodone-acetaminophen (NORCO)  MG per tablet Take 1 tablet by mouth 3 (Three) Times a Day. 6 tablet 0     ketoconazole (NIZORAL) 2 % shampoo Apply 1 Application topically to the appropriate area as directed 1 (One) Time Per Week. Apply to scalp topically on dayshift every Wednesday, Saturday for tinea versicolor for 6 months       Lidocaine 4 % aerosol Place 1 Application on the skin as directed by provider 2 (Two) Times a Day. Apply to R 4th toe distal topically every morning and at bedtime for pain       meclizine (ANTIVERT) 25 MG tablet Take 1 tablet by mouth Every 8 (Eight) Hours As Needed for Dizziness.       melatonin 1 MG tablet Take 3 tablets by mouth Every Night.       naloxone (NARCAN) 4 MG/0.1ML nasal spray Call 911. Don't prime. [place] in 1 nostril for overdose. Repeat in 2-3 minutes in other nostril if no or minimal breathing/responsiveness. 2 each 0     ondansetron (ZOFRAN) 4 MG tablet Take 1 tablet by mouth Every 6 (Six) Hours As Needed for Nausea or Vomiting.       pantoprazole (Protonix) 40 MG EC tablet Take 1 tablet by mouth 2 (Two) Times a Day. 60 tablet 0     Scopolamine 1 MG/3DAYS patch Place 1 patch on the skin as directed by provider Every 72 (Seventy-Two) Hours.       Skin Protectants, Misc. (Eucerin) cream Apply 1 Application topically to the appropriate area as directed 2 (Two) Times a Day. Apply to bilateral arms topically every shift for eczema       tamsulosin (FLOMAX) 0.4 MG capsule 24 hr capsule Take 1 capsule by mouth Every Morning.       white petrolatum ointment Apply 1 Application topically to the appropriate area as directed every night at bedtime. Instill 1 application in both eyes at bedtime for eye maintenance 1/2 inch [facility]  medications:  albumin human, 12.5 g, Intravenous, Once  aspirin, 81 mg, Oral, Daily  budesonide, 0.5 mg, Nebulization, BID - RT  cefTRIAXone, 2,000 mg, Intravenous, Q24H  ipratropium, 0.5 mg, Nebulization, 4x Daily - RT  lactulose, 20 g, Oral, TID  nystatin, , Topical, Q12H  senna-docusate sodium, 2 tablet, Oral, BID  sodium chloride, 10 mL, Intravenous, Q12H      sodium chloride, 100 mL/hr, Last Rate: 100 mL/hr (25 0037)        senna-docusate sodium **AND** polyethylene glycol **AND** bisacodyl **AND** bisacodyl    Calcium Replacement - Follow Nurse / BPA Driven Protocol    diphenhydrAMINE    Magnesium Standard Dose Replacement - Follow Nurse / BPA Driven Protocol    nitroglycerin    Phosphorus Replacement - Follow Nurse / BPA Driven Protocol    Potassium Replacement - Follow Nurse / BPA Driven Protocol    [COMPLETED] Insert Peripheral IV **AND** sodium chloride    sodium chloride    sodium chloride    Family history:  No family history on file.    Social history:  Social History     Tobacco Use    Smoking status: Never    Smokeless tobacco: Former     Types: Snuff   Vaping Use    Vaping status: Never Used   Substance Use Topics    Alcohol use: Not Currently     Comment: two bottles of liquor once a month    Drug use: No       Review of systems:      Positive for:  nothing  Negative for:  chest pain, cough, sob, o/w neg    Objective:  TMax 24 hours:   Temp (24hrs), Av.3 °F (37.9 °C), Min:98.1 °F (36.7 °C), Max:103.6 °F (39.8 °C)      Vitals Ranges:   Temp:  [98.1 °F (36.7 °C)-103.6 °F (39.8 °C)] 99.1 °F (37.3 °C)  Heart Rate:  [] 95  Resp:  [18-23] 23  BP: ()/(57-71) 96/57    Intake/Output Last 3 shifts:  No intake/output data recorded.     Physical Exam:    General Appearance:    Alert, cooperative, NAD   Back:     No CVA tenderness   Lungs:     Respirations unlabored, no wheezing    Heart:    RRR, intact peripheral pulses   Abdomen:     Soft, NDNT, no masses, no guarding   Neuro/Psych:    Orientation intact, mood/affect pleasant       Results review:   I reviewed the patient's new clinical results.    Data review:  Lab Results (last 24 hours)       Procedure Component Value Units Date/Time    Comprehensive Metabolic Panel [[id number]]  (Abnormal) Collected: 04/09/25 0409    Specimen: Blood from Arm, Right Updated: 04/09/25 0444     Glucose 123 mg/dL      BUN 12 mg/dL      Creatinine 0.84 mg/dL      Sodium 133 mmol/L      Potassium 4.3 mmol/L      Chloride 101 mmol/L      CO2 23.6 mmol/L      Calcium 8.5 mg/dL      Total Protein 6.1 g/dL      Albumin 3.0 g/dL      ALT (SGPT) 19 U/L      AST (SGOT) 41 U/L      Alkaline Phosphatase 90 U/L      Total Bilirubin 2.2 mg/dL      Globulin 3.1 gm/dL      A/G Ratio 1.0 g/dL      BUN/Creatinine Ratio 14.3     Anion Gap 8.4 mmol/L      eGFR 98.0 mL/min/1.73     Narrative:      GFR Categories in Chronic Kidney Disease (CKD)      GFR Category          GFR (mL/min/1.73)    Interpretation  G1                     90 or greater         Normal or high (1)  G2                      60-89                Mild decrease (1)  G3a                   45-59                Mild to moderate decrease  G3b                   30-44                Moderate to severe decrease  G4                    15-29                Severe decrease  G5                    14 or less           Kidney failure          (1)In the absence of evidence of kidney disease, neither GFR category G1 or G2 fulfill the criteria for CKD.    eGFR calculation 2021 CKD-EPI creatinine equation, which does not include race as a factor    Magnesium [[id number]]  (Normal) Collected: 04/09/25 0409    Specimen: Blood from Arm, Right Updated: 04/09/25 0444     Magnesium 1.6 mg/dL     Phosphorus [[id number]]  (Normal) Collected: 04/09/25 0409    Specimen: Blood from Arm, Right Updated: 04/09/25 0444     Phosphorus 3.4 mg/dL     TSH [[id number]]  (Normal) Collected: 04/09/25 0409    Specimen: Blood from Arm, Right Updated: 04/09/25  0444     TSH 1.460 uIU/mL     STAT Lactic Acid, Reflex [[id number]]  (Abnormal) Collected: 04/09/25 0409    Specimen: Blood from Arm, Right Updated: 04/09/25 0440     Lactate 2.2 mmol/L     Ammonia [[id number]]  (Normal) Collected: 04/09/25 0409    Specimen: Blood from Arm, Right Updated: 04/09/25 0435     Ammonia 31 umol/L     CBC & Differential [[id number]]  (Abnormal) Collected: 04/09/25 0409    Specimen: Blood from Arm, Right Updated: 04/09/25 0418    Narrative:      The following orders were created for panel order CBC & Differential.  Procedure                               Abnormality         Status                     ---------                               -----------         ------                     CBC Auto Differential[[id number]]        Abnormal            Final result                 Please view results for these tests on the individual orders.    CBC Auto Differential [[id number]]  (Abnormal) Collected: 04/09/25 0409    Specimen: Blood from Arm, Right Updated: 04/09/25 0418     WBC 11.10 10*3/mm3      RBC 4.17 10*6/mm3      Hemoglobin 12.2 g/dL      Hematocrit 38.4 %      MCV 92.1 fL      MCH 29.3 pg      MCHC 31.8 g/dL      RDW 14.5 %      RDW-SD 48.7 fl      MPV 9.3 fL      Platelets 167 10*3/mm3      Neutrophil % 84.9 %      Lymphocyte % 8.3 %      Monocyte % 5.1 %      Eosinophil % 1.0 %      Basophil % 0.4 %      Immature Grans % 0.3 %      Neutrophils, Absolute 9.43 10*3/mm3      Lymphocytes, Absolute 0.92 10*3/mm3      Monocytes, Absolute 0.57 10*3/mm3      Eosinophils, Absolute 0.11 10*3/mm3      Basophils, Absolute 0.04 10*3/mm3      Immature Grans, Absolute 0.03 10*3/mm3      nRBC 0.0 /100 WBC     STAT Lactic Acid, Reflex [[id number]]  (Abnormal) Collected: 04/09/25 0047    Specimen: Blood from Arm, Right Updated: 04/09/25 0119     Lactate 2.3 mmol/L     Urinalysis, Microscopic Only - Straight Cath [[id number]]  (Abnormal) Collected: 04/08/25 1933    Specimen: Urine from Straight Cath Updated:  04/08/25 2031     RBC, UA Too Numerous to Count /HPF      WBC, UA 6-10 /HPF      Bacteria, UA Trace /HPF      Squamous Epithelial Cells, UA 0-2 /HPF      Transitional Epithelial Cells, UA 0-2 /HPF      Hyaline Casts, UA 0-2 /LPF      Amorphous Crystals, UA Small/1+ /HPF      Mucus, UA Small/1+ /HPF      Methodology Manual Light Microscopy    Urine Culture - Urine, Straight Cath [[id number]] Collected: 04/08/25 1933    Specimen: Urine from Straight Cath Updated: 04/08/25 2031    Respiratory Panel PCR w/COVID-19(SARS-CoV-2) KEON/THIAGO/MJ/PAD/COR/BIGG In-House, NP Swab in UTM/VTM, 2 HR TAT - Swab, Nasopharynx [[id number]]  (Normal) Collected: 04/08/25 1933    Specimen: Swab from Nasopharynx Updated: 04/08/25 2027     ADENOVIRUS, PCR Not Detected     Coronavirus 229E Not Detected     Coronavirus HKU1 Not Detected     Coronavirus NL63 Not Detected     Coronavirus OC43 Not Detected     COVID19 Not Detected     Human Metapneumovirus Not Detected     Human Rhinovirus/Enterovirus Not Detected     Influenza A PCR Not Detected     Influenza B PCR Not Detected     Parainfluenza Virus 1 Not Detected     Parainfluenza Virus 2 Not Detected     Parainfluenza Virus 3 Not Detected     Parainfluenza Virus 4 Not Detected     RSV, PCR Not Detected     Bordetella pertussis pcr Not Detected     Bordetella parapertussis PCR Not Detected     Chlamydophila pneumoniae PCR Not Detected     Mycoplasma pneumo by PCR Not Detected    Narrative:      In the setting of a positive respiratory panel with a viral infection PLUS a negative procalcitonin without other underlying concern for bacterial infection, consider observing off antibiotics or discontinuation of antibiotics and continue supportive care. If the respiratory panel is positive for atypical bacterial infection (Bordetella pertussis, Chlamydophila pneumoniae, or Mycoplasma pneumoniae), consider antibiotic de-escalation to target atypical bacterial infection.    POC Lactate [[id number]]   (Abnormal) Collected: 04/08/25 2019    Specimen: Blood Updated: 04/08/25 2025     Lactate 2.1 mmol/L      Comment: Serial Number: [id number]:  [id number]       TSH Rfx On Abnormal To Free T4 [[id number]]  (Normal) Collected: 04/08/25 1915    Specimen: Blood Updated: 04/08/25 2012     TSH 2.220 uIU/mL     Comprehensive Metabolic Panel [[id number]]  (Abnormal) Collected: 04/08/25 1915    Specimen: Blood Updated: 04/08/25 2008     Glucose 117 mg/dL      BUN 10 mg/dL      Creatinine 1.01 mg/dL      Sodium 137 mmol/L      Potassium 3.9 mmol/L      Chloride 98 mmol/L      CO2 26.4 mmol/L      Calcium 9.4 mg/dL      Total Protein 7.4 g/dL      Albumin 3.7 g/dL      ALT (SGPT) 10 U/L      AST (SGOT) 33 U/L      Alkaline Phosphatase 121 U/L      Total Bilirubin 1.5 mg/dL      Globulin 3.7 gm/dL      A/G Ratio 1.0 g/dL      BUN/Creatinine Ratio 9.9     Anion Gap 12.6 mmol/L      eGFR 83.6 mL/min/1.73     Narrative:      GFR Categories in Chronic Kidney Disease (CKD)      GFR Category          GFR (mL/min/1.73)    Interpretation  G1                     90 or greater         Normal or high (1)  G2                      60-89                Mild decrease (1)  G3a                   45-59                Mild to moderate decrease  G3b                   30-44                Moderate to severe decrease  G4                    15-29                Severe decrease  G5                    14 or less           Kidney failure          (1)In the absence of evidence of kidney disease, neither GFR category G1 or G2 fulfill the criteria for CKD.    eGFR calculation 2021 CKD-EPI creatinine equation, which does not include race as a factor    Magnesium [[id number]]  (Normal) Collected: 04/08/25 1915    Specimen: Blood Updated: 04/08/25 2008     Magnesium 1.8 mg/dL     Ammonia [[id number]]  (Abnormal) Collected: 04/08/25 1933    Specimen: Blood from Arm, Left Updated: 04/08/25 1958     Ammonia 101 umol/L     Extra Tubes [[id number]] Collected:  04/08/25 1915    Specimen: Blood, Venous Line Updated: 04/08/25 1946    Narrative:      The following orders were created for panel order Extra Tubes.  Procedure                               Abnormality         Status                     ---------                               -----------         ------                     Gold Top - SST[[id number]]                                   Final result               Light Blue Top[[id number]]                                   Final result                 Please view results for these tests on the individual orders.    Gold Top - SST [[id number]] Collected: 04/08/25 1915    Specimen: Blood Updated: 04/08/25 1946     Extra Tube Hold for add-ons.     Comment: Auto resulted.       Light Blue Top [[id number]] Collected: 04/08/25 1915    Specimen: Blood Updated: 04/08/25 1946     Extra Tube Hold for add-ons.     Comment: Auto resulted       Urinalysis With Culture If Indicated - Straight Cath [[id number]]  (Abnormal) Collected: 04/08/25 1933    Specimen: Urine from Straight Cath Updated: 04/08/25 1945     Color, UA Dark Yellow     Appearance, UA Hazy     pH, UA <=5.0     Specific Gravity, UA 1.026     Glucose, UA Negative     Ketones, UA Trace     Bilirubin, UA Negative     Blood, UA Large (3+)     Protein, UA 30 mg/dL (1+)     Leuk Esterase, UA Small (1+)     Nitrite, UA Negative     Urobilinogen, UA 0.2 E.U./dL    Narrative:      In absence of clinical symptoms, the presence of pyuria, bacteria, and/or nitrites on the urinalysis result does not correlate with infection.    CBC & Differential [[id number]]  (Abnormal) Collected: 04/08/25 1915    Specimen: Blood Updated: 04/08/25 1942    Narrative:      The following orders were created for panel order CBC & Differential.  Procedure                               Abnormality         Status                     ---------                               -----------         ------                     CBC Auto Differential[[id number]]         Abnormal            Final result                 Please view results for these tests on the individual orders.    CBC Auto Differential [[id number]]  (Abnormal) Collected: 04/08/25 1915    Specimen: Blood Updated: 04/08/25 1942     WBC 4.88 10*3/mm3      RBC 4.79 10*6/mm3      Hemoglobin 13.9 g/dL      Hematocrit 45.0 %      MCV 93.9 fL      MCH 29.0 pg      MCHC 30.9 g/dL      RDW 14.1 %      RDW-SD 49.2 fl      MPV 9.4 fL      Platelets 189 10*3/mm3      Neutrophil % 85.6 %      Lymphocyte % 10.5 %      Monocyte % 2.9 %      Eosinophil % 0.4 %      Basophil % 0.4 %      Immature Grans % 0.2 %      Neutrophils, Absolute 4.18 10*3/mm3      Lymphocytes, Absolute 0.51 10*3/mm3      Monocytes, Absolute 0.14 10*3/mm3      Eosinophils, Absolute 0.02 10*3/mm3      Basophils, Absolute 0.02 10*3/mm3      Immature Grans, Absolute 0.01 10*3/mm3      nRBC 0.0 /100 WBC     Blood Culture - Blood, Arm, Left [[id number]] Collected: 04/08/25 1933    Specimen: Blood from Arm, Left Updated: 04/08/25 1938    Blood Culture - Blood, Arm, Right [[id number]] Collected: 04/08/25 1915    Specimen: Blood from Arm, Right Updated: 04/08/25 1938    POC Glucose Once [[id number]]  (Normal) Collected: 04/08/25 1843    Specimen: Blood Updated: 04/08/25 1845     Glucose 101 mg/dL      Comment: Serial Number: [id number]:  [id number]                Imaging:  Imaging Results (Last 24 Hours)       Procedure Component Value Units Date/Time    XR Abdomen KUB [[id number]] Collected: 04/09/25 0315     Updated: 04/09/25 0318    Narrative:      XR ABDOMEN KUB    Date of Exam: 4/9/2025 2:43 AM EDT    Indication: NG tube placement    Comparison: CT abdomen and pelvis 4/8/2025.    Findings:  See impression.      Impression:      Impression:  Gastric suction tube terminates in the stomach.          Electronically Signed: Sulaiman Zabala MD    4/9/2025 3:16 AM EDT    Workstation ID: [id number]    CT Abdomen Pelvis With Contrast [[id number]] Collected: 04/08/25 6730      Updated: 04/08/25 2325    Narrative:      CT ABDOMEN PELVIS W CONTRAST    Date of Exam: 4/8/2025 10:38 PM EDT    Indication: Acute febrile illness elevated ammonia level please look at liver.    Comparison: 8/26/2024.    Technique: Axial CT images were obtained of the abdomen and pelvis following the uneventful intravenous administration of iodinated contrast. Sagittal and coronal reconstructions were performed.  Automated exposure control and iterative reconstruction   methods were used.        Findings:  Heart size is normal. There is fluid in the distal esophagus. There is dependent bibasilar atelectasis. There appears to be superimposed patchy airspace disease in the lung bases that could reflect pneumonia or artifact from motion and atelectasis.   Correlate with symptoms. No acute findings in the superficial soft tissues. No acute osseous abnormality or destructive bone lesion. There is severe thoracolumbar spondylosis. There is multilevel neuroforaminal and spinal canal narrowing. 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    The liver, gallbladder, bile ducts, pancreas, mid and distal stomach, duodenum, spleen and adrenal glands appear within normal limits. There are numerous bilateral nonobstructing kidney stones. There is a large stone in the left renal pelvis measuring up   to 11 mm diameter. There is mild left-sided hydronephrosis and there is moderate peripelvic fat stranding at the left kidney. This could be related to intermittent obstruction or urinary tract infection. The distal ureters appear unremarkable. The   urinary bladder and prostate gland are within normal limits. Normal appendix. There is colonic diverticulosis. No small bowel distention. There is no aneurysm. No ascites, pneumoperitoneum or lymphadenopathy.      Impression:      Impression:  1.A large stone in the left renal pelvis measuring up to 11 mm diameter. There is mild left-sided hydronephrosis and there is moderate  peripelvic fat stranding at the left kidney. This could be related to intermittent obstruction or urinary tract   infection.  2.Numerous bilateral nonobstructing kidney stones.  3.Colonic diverticulosis.  4.Severe thoracolumbar spondylosis.  5.Bibasilar atelectasis with possible superimposed pneumonia. Correlate with symptoms.                Electronically Signed: Sulaiman Zabala MD    4/8/2025 11:23 PM EDT    Workstation ID: [id number]    XR Chest 1 View [[id number]] Collected: 04/08/25 1914     Updated: 04/08/25 1917    Narrative:      XR CHEST 1 VW    Date of Exam: 4/8/2025 6:51 PM EDT    Indication: Altered mental status    Comparison: Chest radiograph and chest CT 8/26/2024    Findings:      Mediastinum: Cardiac silhouette appears unchanged and not enlarged. Loop recorder projects over the left chest.    Lungs: The lungs appear clear without focal consolidation appreciated.    Pleura: No pleural effusion or pneumothorax.    Bones and soft tissues: No acute, displaced fracture seen.        Impression:      Impression:  No radiographic evidence of acute cardiopulmonary abnormality.        Electronically Signed: Louie Crowley    4/8/2025 7:15 PM EDT    Workstation ID: [id number]               Assessment:     Large stone in the left renal pelvis measuring up to 11 mm diameter. There is mild left-sided hydronephrosis and there is moderate peripelvic fat stranding at the left kidney. This could be related to intermittent obstruction or urinary trac    Plan:     Npo  Primary to follow cx specific abx  Cysto left stent  RBO explained but pt has altered mental status    PATRICK Harmon  04/09/25  06:42 EDT

## 2025-04-09 NOTE — PAYOR COMM NOTE
"Clinical for case# [id number]     Inpatient order 4/8/25    ER admit   MD notes and clinical attached.   ============  AUTHORIZATION PENDING:   PLEASE FAX OR CALL DETERMINATION TO CONTACT BELOW:       THANK YOU,    ENRRIQUE HerculesN, RN  Utilization Review  [facility]  Phone: [phone]  Fax: [phone]      NPI: [id number]  Tax ID: [id number]      Kevin Hurtado (64 y.o. Male)       Date of Birth   1960    Social Security Number       Address   [address] [place] [address]    Home Phone   [phone]    MRN   [id number]       Bahai   Hindu    Marital Status   Single                            Admission Date   4/8/2025    Admission Type   Emergency    Admitting Provider   Harish Fu MD    Attending Provider   Danya Sommer MD    Department, Room/Bed   [facility] SURGICAL INPATIENT, 4127/1       Discharge Date       Discharge Disposition       Discharge Destination                                 Attending Provider: Danya Sommer MD    Allergies: No Known Allergies    Isolation: None   Infection: None   Code Status: CPR    Ht: 175.3 cm (69\")   Wt: 89.9 kg (198 lb 3.1 oz)    Admission Cmt: None   Principal Problem: Altered mental status [R41.82]                   Active Insurance as of 4/8/2025       Primary Coverage       Payor Plan Insurance Group Employer/Plan Group    [place] [facility] [place] [facility] [id number]       Payor Plan Address Payor Plan Phone Number Payor Plan Fax Number Effective Dates    [address] [address]   7/1/2024 - None Entered    [place] [address]         Subscriber Name Subscriber Birth Date Member ID       KEVIN HURTADO 1960 [id number]                     Emergency Contacts        (Rel.) Home Phone Work Phone Mobile Phone    Yossi Hurtado (Brother) (Legal Guardian) -- -- [phone]    ROCKBLADE HANKS (Mother) [phone] -- [phone]                 History & Physical        Harish Fu MD " at 25 2201              [facility] Medicine Services  History & Physical    Patient Name: Kevin Hurtado  : 1960  MRN: [id number]  Primary Care Physician:  Keesha Lemus MD  Date of admission: 2025  Date and Time of Service: 2025 at 2201    Subjective      Chief Complaint: Altered mental status    History of Present Illness: Kevin Hurtado is a 64 y.o. male with a CMH of CVA with left-sided hemiparesis, CHF, hyperlipidemia, hypertension, who presented from nursing facility ([facility] ) to [facility] on 2025 with altered mental status. Patient is currently awake, conversational however he is disoriented.  Unable to obtain any further history from the patient.  Per ED Note patient received Narcan at the facility with no change mental status.      Per the nursing facility, patient was disoriented, patient normally is at baseline awake alert [place] x 3.  Patient was recently started on fentanyl patch per facility, he received Narcan with no improvement, therefore he was sent to the hospital further evaluation.  Patient is currently DNR/DNI, I confirmed with his brother on the phone.    In ED, patient vitals stable, Tmax 103.6, CMP mostly unremarkable, CBC also unremarkable, lactic acid 2.1, ammonia 101, UA showing bacteria, urine cultures pending. Chest x-ray unremarkable for acute findings Patient given dose of Zosyn, lactulose.  Patient to medicine team for further inpatient management.    Review of Systems negative as mentioned above    Personal History     Past Medical History:   Diagnosis Date    Elevated cholesterol     GERD (gastroesophageal reflux disease)     Stroke        Past Surgical History:   Procedure Laterality Date    ENDOSCOPY N/A 2024    Procedure: ESOPHAGOGASTRODUODENOSCOPY with biopsy x 3 areas;  Surgeon: KG Arizmendi MD;  Location: [facility] ENDOSCOPY;  Service: Gastroenterology;  Laterality: N/A;  post op:       Family History: family  history is not on file. Otherwise pertinent FHx was reviewed and not pertinent to current issue.    Social History:  reports that he has never smoked. He has quit using smokeless tobacco.  His smokeless tobacco use included snuff. He reports that he does not currently use alcohol. He reports that he does not use drugs.    Home Medications:  Prior to Admission Medications       Prescriptions Last Dose Informant Patient Reported? Taking?    aluminum-magnesium hydroxide-simethicone (MAALOX/MYLANTA) 200-200-20 MG/5ML suspension   Yes No    Take 30 mL by mouth Every 8 (Eight) Hours As Needed for Indigestion or Heartburn.    amoxicillin-clavulanate (AUGMENTIN) 875-125 MG per tablet   No No    Take 1 tablet by mouth 2 (Two) Times a Day.    aspirin 81 MG chewable tablet   Yes No    Chew 1 tablet Every Morning.    atorvastatin (LIPITOR) 40 MG tablet   Yes No    Take 1 tablet by mouth Every Night.    baclofen (LIORESAL) 10 MG tablet   Yes No    Take 1 tablet by mouth 3 (Three) Times a Day.    calcium carbonate (TUMS) 500 MG chewable tablet   Yes No    Chew 1 tablet 3 (Three) Times a Day.    carboxymethylcellulose (REFRESH PLUS) 0.5 % solution   Yes No    Administer 2 drops to both eyes 2 (Two) Times a Day.    ezetimibe (ZETIA) 10 MG tablet   Yes No    Take 1 tablet by mouth Every Morning.    famotidine (PEPCID) 20 MG tablet   Yes No    Take 1 tablet by mouth 2 (Two) Times a Day.    gabapentin (NEURONTIN) 400 MG capsule   Yes No    Take 1 capsule by mouth 3 (Three) Times a Day.    HYDROcodone-acetaminophen (NORCO)  MG per tablet   No No    Take 1 tablet by mouth 3 (Three) Times a Day.    ketoconazole (NIZORAL) 2 % shampoo   Yes No    Apply 1 Application topically to the appropriate area as directed 1 (One) Time Per Week. Apply to scalp topically on dayshift every Wednesday, Saturday for tinea versicolor for 6 months    Lidocaine 4 % aerosol   Yes No    Place 1 Application on the skin as directed by provider 2 (Two) Times  a Day. Apply to R 4th toe distal topically every morning and at bedtime for pain    meclizine (ANTIVERT) 25 MG tablet   Yes No    Take 1 tablet by mouth Every 8 (Eight) Hours As Needed for Dizziness.    melatonin 1 MG tablet   Yes No    Take 3 tablets by mouth Every Night.    naloxone (NARCAN) 4 MG/0.1ML nasal spray   No No    Call 911. Don't prime. [place] in 1 nostril for overdose. Repeat in 2-3 minutes in other nostril if no or minimal breathing/responsiveness.    ondansetron (ZOFRAN) 4 MG tablet   Yes No    Take 1 tablet by mouth Every 6 (Six) Hours As Needed for Nausea or Vomiting.    pantoprazole (Protonix) 40 MG EC tablet   No No    Take 1 tablet by mouth 2 (Two) Times a Day.    Scopolamine 1 MG/3DAYS patch   Yes No    Place 1 patch on the skin as directed by provider Every 72 (Seventy-Two) Hours.    Skin Protectants, Misc. (Eucerin) cream   Yes No    Apply 1 Application topically to the appropriate area as directed 2 (Two) Times a Day. Apply to bilateral arms topically every shift for eczema    tamsulosin (FLOMAX) 0.4 MG capsule 24 hr capsule   Yes No    Take 1 capsule by mouth Every Morning.    white petrolatum ointment   Yes No    Apply 1 Application topically to the appropriate area as directed every night at bedtime. Instill 1 application in both eyes at bedtime for eye maintenance 1/2 inch ribbon outer lid              Allergies:  No Known Allergies    Objective      Vitals:   Temp:  [100.4 °F (38 °C)-103.6 °F (39.8 °C)] 103.6 °F (39.8 °C)  Heart Rate:  [108-143] 108  Resp:  [22] 22  BP: (104-114)/(61-64) 110/62  There is no height or weight on file to calculate BMI.  Physical Exam  General: Awake, conversational, disoriented, chronic ill-appearing  HEENT: Atraumatic normocephalic  Cardio: Heart rate normal, rhythm regular  Respiratory: Clear to auscultation, no wheezes on my exam  Abdomen: Soft, nontender, no rebound or guarding  Neuro: Disoriented, left-sided hemiparesis noted    Diagnostic Data:  Lab  Results (last 24 hours)       Procedure Component Value Units Date/Time    Urinalysis, Microscopic Only - Straight Cath [[id number]]  (Abnormal) Collected: 04/08/25 1933    Specimen: Urine from Straight Cath Updated: 04/08/25 2031     RBC, UA Too Numerous to Count /HPF      WBC, UA 6-10 /HPF      Bacteria, UA Trace /HPF      Squamous Epithelial Cells, UA 0-2 /HPF      Transitional Epithelial Cells, UA 0-2 /HPF      Hyaline Casts, UA 0-2 /LPF      Amorphous Crystals, UA Small/1+ /HPF      Mucus, UA Small/1+ /HPF      Methodology Manual Light Microscopy    Urine Culture - Urine, Straight Cath [[id number]] Collected: 04/08/25 1933    Specimen: Urine from Straight Cath Updated: 04/08/25 2031    Respiratory Panel PCR w/COVID-19(SARS-CoV-2) KEON/THIAGO/MJ/PAD/COR/BIGG In-House, NP Swab in UTM/VTM, 2 HR TAT - Swab, Nasopharynx [[id number]]  (Normal) Collected: 04/08/25 1933    Specimen: Swab from Nasopharynx Updated: 04/08/25 2027     ADENOVIRUS, PCR Not Detected     Coronavirus 229E Not Detected     Coronavirus HKU1 Not Detected     Coronavirus NL63 Not Detected     Coronavirus OC43 Not Detected     COVID19 Not Detected     Human Metapneumovirus Not Detected     Human Rhinovirus/Enterovirus Not Detected     Influenza A PCR Not Detected     Influenza B PCR Not Detected     Parainfluenza Virus 1 Not Detected     Parainfluenza Virus 2 Not Detected     Parainfluenza Virus 3 Not Detected     Parainfluenza Virus 4 Not Detected     RSV, PCR Not Detected     Bordetella pertussis pcr Not Detected     Bordetella parapertussis PCR Not Detected     Chlamydophila pneumoniae PCR Not Detected     Mycoplasma pneumo by PCR Not Detected    Narrative:      In the setting of a positive respiratory panel with a viral infection PLUS a negative procalcitonin without other underlying concern for bacterial infection, consider observing off antibiotics or discontinuation of antibiotics and continue supportive care. If the respiratory panel is positive  for atypical bacterial infection (Bordetella pertussis, Chlamydophila pneumoniae, or Mycoplasma pneumoniae), consider antibiotic de-escalation to target atypical bacterial infection.    POC Lactate [[id number]]  (Abnormal) Collected: 04/08/25 2019    Specimen: Blood Updated: 04/08/25 2025     Lactate 2.1 mmol/L      Comment: Serial Number: [id number]:  [id number]       TSH Rfx On Abnormal To Free T4 [[id number]]  (Normal) Collected: 04/08/25 1915    Specimen: Blood Updated: 04/08/25 2012     TSH 2.220 uIU/mL     Comprehensive Metabolic Panel [[id number]]  (Abnormal) Collected: 04/08/25 1915    Specimen: Blood Updated: 04/08/25 2008     Glucose 117 mg/dL      BUN 10 mg/dL      Creatinine 1.01 mg/dL      Sodium 137 mmol/L      Potassium 3.9 mmol/L      Chloride 98 mmol/L      CO2 26.4 mmol/L      Calcium 9.4 mg/dL      Total Protein 7.4 g/dL      Albumin 3.7 g/dL      ALT (SGPT) 10 U/L      AST (SGOT) 33 U/L      Alkaline Phosphatase 121 U/L      Total Bilirubin 1.5 mg/dL      Globulin 3.7 gm/dL      A/G Ratio 1.0 g/dL      BUN/Creatinine Ratio 9.9     Anion Gap 12.6 mmol/L      eGFR 83.6 mL/min/1.73     Narrative:      GFR Categories in Chronic Kidney Disease (CKD)      GFR Category          GFR (mL/min/1.73)    Interpretation  G1                     90 or greater         Normal or high (1)  G2                      60-89                Mild decrease (1)  G3a                   45-59                Mild to moderate decrease  G3b                   30-44                Moderate to severe decrease  G4                    15-29                Severe decrease  G5                    14 or less           Kidney failure          (1)In the absence of evidence of kidney disease, neither GFR category G1 or G2 fulfill the criteria for CKD.    eGFR calculation 2021 CKD-EPI creatinine equation, which does not include race as a factor    Magnesium [[id number]]  (Normal) Collected: 04/08/25 1915    Specimen: Blood Updated: 04/08/25  2008     Magnesium 1.8 mg/dL     Ammonia [[id number]]  (Abnormal) Collected: 04/08/25 1933    Specimen: Blood from Arm, Left Updated: 04/08/25 1958     Ammonia 101 umol/L     Extra Tubes [[id number]] Collected: 04/08/25 1915    Specimen: Blood, Venous Line Updated: 04/08/25 1946    Narrative:      The following orders were created for panel order Extra Tubes.  Procedure                               Abnormality         Status                     ---------                               -----------         ------                     Gold Top - SST[[id number]]                                   Final result               Light Blue Top[[id number]]                                   Final result                 Please view results for these tests on the individual orders.    Gold Top - SST [[id number]] Collected: 04/08/25 1915    Specimen: Blood Updated: 04/08/25 1946     Extra Tube Hold for add-ons.     Comment: Auto resulted.       Light Blue Top [[id number]] Collected: 04/08/25 1915    Specimen: Blood Updated: 04/08/25 1946     Extra Tube Hold for add-ons.     Comment: Auto resulted       Urinalysis With Culture If Indicated - Straight Cath [[id number]]  (Abnormal) Collected: 04/08/25 1933    Specimen: Urine from Straight Cath Updated: 04/08/25 1945     Color, UA Dark Yellow     Appearance, UA Hazy     pH, UA <=5.0     Specific Gravity, UA 1.026     Glucose, UA Negative     Ketones, UA Trace     Bilirubin, UA Negative     Blood, UA Large (3+)     Protein, UA 30 mg/dL (1+)     Leuk Esterase, UA Small (1+)     Nitrite, UA Negative     Urobilinogen, UA 0.2 E.U./dL    Narrative:      In absence of clinical symptoms, the presence of pyuria, bacteria, and/or nitrites on the urinalysis result does not correlate with infection.    CBC & Differential [[id number]]  (Abnormal) Collected: 04/08/25 1915    Specimen: Blood Updated: 04/08/25 1942    Narrative:      The following orders were created for panel order CBC &  Differential.  Procedure                               Abnormality         Status                     ---------                               -----------         ------                     CBC Auto Differential[[id number]]        Abnormal            Final result                 Please view results for these tests on the individual orders.    CBC Auto Differential [[id number]]  (Abnormal) Collected: 04/08/25 1915    Specimen: Blood Updated: 04/08/25 1942     WBC 4.88 10*3/mm3      RBC 4.79 10*6/mm3      Hemoglobin 13.9 g/dL      Hematocrit 45.0 %      MCV 93.9 fL      MCH 29.0 pg      MCHC 30.9 g/dL      RDW 14.1 %      RDW-SD 49.2 fl      MPV 9.4 fL      Platelets 189 10*3/mm3      Neutrophil % 85.6 %      Lymphocyte % 10.5 %      Monocyte % 2.9 %      Eosinophil % 0.4 %      Basophil % 0.4 %      Immature Grans % 0.2 %      Neutrophils, Absolute 4.18 10*3/mm3      Lymphocytes, Absolute 0.51 10*3/mm3      Monocytes, Absolute 0.14 10*3/mm3      Eosinophils, Absolute 0.02 10*3/mm3      Basophils, Absolute 0.02 10*3/mm3      Immature Grans, Absolute 0.01 10*3/mm3      nRBC 0.0 /100 WBC     Blood Culture - Blood, Arm, Left [[id number]] Collected: 04/08/25 1933    Specimen: Blood from Arm, Left Updated: 04/08/25 1938    Blood Culture - Blood, Arm, Right [[id number]] Collected: 04/08/25 1915    Specimen: Blood from Arm, Right Updated: 04/08/25 1938    POC Glucose Once [[id number]]  (Normal) Collected: 04/08/25 1843    Specimen: Blood Updated: 04/08/25 1845     Glucose 101 mg/dL      Comment: Serial Number: [id number]:  [id number]                Imaging Results (Last 24 Hours)       Procedure Component Value Units Date/Time    XR Chest 1 View [[id number]] Collected: 04/08/25 1914     Updated: 04/08/25 1917    Narrative:      XR CHEST 1 VW    Date of Exam: 4/8/2025 6:51 PM EDT    Indication: Altered mental status    Comparison: Chest radiograph and chest CT 8/26/2024    Findings:      Mediastinum: Cardiac silhouette appears  unchanged and not enlarged. Loop recorder projects over the left chest.    Lungs: The lungs appear clear without focal consolidation appreciated.    Pleura: No pleural effusion or pneumothorax.    Bones and soft tissues: No acute, displaced fracture seen.        Impression:      Impression:  No radiographic evidence of acute cardiopulmonary abnormality.        Electronically Signed: Louie Crowley    4/8/2025 7:15 PM EDT    Workstation ID: [id number]              Assessment & Plan    Kevin Hurtado is a 64 y.o. male with a CMH of CVA with left-sided hemiparesis, CHF, hyperlipidemia, hypertension, who presented from nursing facility ([facility] ) to [facility] on 4/8/2025 with altered mental status.     Assessments  Acute encephalopathy: Multifactorial metabolic and infectious  History of CVA with left-sided hemiparesis  Heart failure with midrange ejection fraction, per echo LVEF of 46% per echo in 2022  Hyperammonemia, ammonia 101 on admission  Possible UTI/ PNA on abx  Large stone in the left renal pelvis, measuring 11 mm  Mild left-sided hydronephrosis  Moderate peripelvic fat stranding in the left kidney  Hypertension  Hyperlipidemia    Plan    -Check CT head without contrast, check TSH, Start lactulose 3 times daily for hyperammonemia, will place NGT for lactulose, monitor for bowel movements, repeat ammonia in the morning    -Patient was started on Zosyn in ED, developed a mild rash on the upper thorax, will DC Zosyn, CT abdomen showing mild hydronephrosis and UA showing possible [place], will start on ceftriaxone, follow-up on blood culture and urine cultures    -Will start the patient on normal saline 75 cc an hour, chest x-ray clear although CT showing possible pneumonia, monitor for respiratory status closely while on fluids, be cautious with fluids given CHF history, patient is currently has NGT in place now    -Urology consult for hydronephrosis, follow further recommendation in the  morning    -Hold any antihypertension medications for now given soft BP    -Wound consult for evaluation of left buttock wound, no fluctuance on exam    -AM labs    VTE Prophylaxis:  No VTE prophylaxis order currently exists.      CODE STATUS:  Presumed Full      I discussed the patient's findings and my recommendations with patient.      This document has been electronically signed by Harish Fu MD on April 8, 2025 22:02 EDT   [facility] ([facility] Hospitalist Team     Electronically signed by Harish Fu MD at 04/09/25 0336          Emergency Department Notes        Pardeep Dillon MD at 04/08/25 2110          Subjective   History of Present Illness  Below is from EMS nursing home patient is a stroke difficulty speaking altered mental status    History of present is a 64-year-old male has had a previous stroke with muscle paralysis who was noted to have some altered mental status today cough and congestion some tremor and he was sent to the ER.  Patient had a fentanyl patch on the nursing home gave Narcan with no change.  Patient is unable to give any history he will speak some but he is very confused.  He has no tremor currently.      Review of Systems   Unable to perform ROS: Mental status change       Past Medical History:   Diagnosis Date    Elevated cholesterol     GERD (gastroesophageal reflux disease)     Stroke        No Known Allergies    Past Surgical History:   Procedure Laterality Date    ENDOSCOPY N/A 8/27/2024    Procedure: ESOPHAGOGASTRODUODENOSCOPY with biopsy x 3 areas;  Surgeon: KG Arizmendi MD;  Location: [facility] ENDOSCOPY;  Service: Gastroenterology;  Laterality: N/A;  post op:       No family history on file.    Social History     Socioeconomic History    Marital status: Single   Tobacco Use    Smoking status: Never    Smokeless tobacco: Former     Types: Snuff   Vaping Use    Vaping status: Never Used   Substance and Sexual Activity    Alcohol use: Not Currently     Comment:  two bottles of liquor once a month    Drug use: No    Sexual activity: Defer     Partners: Female     Prior to Admission medications    Medication Sig Start Date End Date Taking? Authorizing Provider   aluminum-magnesium hydroxide-simethicone (MAALOX/MYLANTA) 200-200-20 MG/5ML suspension Take 30 mL by mouth Every 8 (Eight) Hours As Needed for Indigestion or Heartburn.    Jose L Ferrer MD   amoxicillin-clavulanate (AUGMENTIN) 875-125 MG per tablet Take 1 tablet by mouth 2 (Two) Times a Day. 8/28/24   Stanford Kiran MD   aspirin 81 MG chewable tablet Chew 1 tablet Every Morning.    Jose L Ferrer MD   atorvastatin (LIPITOR) 40 MG tablet Take 1 tablet by mouth Every Night.    Jose L Ferrer MD   baclofen (LIORESAL) 10 MG tablet Take 1 tablet by mouth 3 (Three) Times a Day.    Jose L Ferrer MD   calcium carbonate (TUMS) 500 MG chewable tablet Chew 1 tablet 3 (Three) Times a Day.    Jose L Ferrer MD   carboxymethylcellulose (REFRESH PLUS) 0.5 % solution Administer 2 drops to both eyes 2 (Two) Times a Day.    Jose L Ferrer MD   ezetimibe (ZETIA) 10 MG tablet Take 1 tablet by mouth Every Morning.    Jose L Ferrer MD   famotidine (PEPCID) 20 MG tablet Take 1 tablet by mouth 2 (Two) Times a Day.    Jose L Ferrer MD   gabapentin (NEURONTIN) 400 MG capsule Take 1 capsule by mouth 3 (Three) Times a Day.    Jose L Ferrer MD   HYDROcodone-acetaminophen (NORCO)  MG per tablet Take 1 tablet by mouth 3 (Three) Times a Day. 8/28/24   Stanford Kiran MD   ketoconazole (NIZORAL) 2 % shampoo Apply 1 Application topically to the appropriate area as directed 1 (One) Time Per Week. Apply to scalp topically on dayshift every Wednesday, Saturday for tinea versicolor for 6 months    Jose L Ferrer MD   Lidocaine 4 % aerosol Place 1 Application on the skin as directed by provider 2 (Two) Times a Day. Apply to R 4th toe distal topically every morning  and at bedtime for pain    Jose L Ferrer MD   meclizine (ANTIVERT) 25 MG tablet Take 1 tablet by mouth Every 8 (Eight) Hours As Needed for Dizziness.    Jose L Ferrer MD   melatonin 1 MG tablet Take 3 tablets by mouth Every Night.    Jose L Ferrer MD   naloxone (NARCAN) 4 MG/0.1ML nasal spray Call 911. Don't prime. [place] in 1 nostril for overdose. Repeat in 2-3 minutes in other nostril if no or minimal breathing/responsiveness. 8/28/24   Stanford Kiran MD   ondansetron (ZOFRAN) 4 MG tablet Take 1 tablet by mouth Every 6 (Six) Hours As Needed for Nausea or Vomiting.    Jose L Ferrer MD   pantoprazole (Protonix) 40 MG EC tablet Take 1 tablet by mouth 2 (Two) Times a Day. 8/28/24   Stanford Kiran MD   Scopolamine 1 MG/3DAYS patch Place 1 patch on the skin as directed by provider Every 72 (Seventy-Two) Hours.    Jose L Ferrer MD   Skin Protectants, Misc. (Eucerin) cream Apply 1 Application topically to the appropriate area as directed 2 (Two) Times a Day. Apply to bilateral arms topically every shift for eczema    Jose L Ferrer MD   tamsulosin (FLOMAX) 0.4 MG capsule 24 hr capsule Take 1 capsule by mouth Every Morning.    Jose L Ferrer MD   white petrolatum ointment Apply 1 Application topically to the appropriate area as directed every night at bedtime. Instill 1 application in both eyes at bedtime for eye maintenance 1/2 inch ribbon outer lid    Jose L Ferrer MD            Objective   Physical Exam  Constitutional 64-year-old male sleepy but arousable he will open his eyes he will mumble some words.  Temperature 103 tachycardic at 130.  Sats about 93% on couple liters of oxygen.  Patient is a lot of rhonchi throughout..  HEENT pupils equal round reactive extraocular muscles intact, mouth was dry neck is supple no adenopathy no JVD no bruits lungs diffuse rhonchi throughout no retractions heart is regular tachycardic without murmur and was soft  that tenderness good bowel sounds no pulsatile masses extremities symptoms pulses are equal upper and lower extremities no edema cords or Homans' sign or evidence of DVTs got contractures on the left side.  Skin is otherwise warm and dry patient has a erythematous rash noted up across the trunk he is got a wound bedsore in the left buttock but no significant surrounding erythema or fluctuance or crepitus or subcutaneous air no evidence of necrotizing fasciitis neurologic Sleepy but he is arousable he does follow commands chronic weakness to the left side from previous stroke strength okay on the right side he is able to move his toes and squeeze my hand  Procedures          ED Course      Results for orders placed or performed during the hospital encounter of 04/08/25   POC Glucose Once    Collection Time: 04/08/25  6:43 PM    Specimen: Blood   Result Value Ref Range    Glucose 101 70 - 105 mg/dL   ECG 12 Lead Stroke Evaluation    Collection Time: 04/08/25  6:47 PM   Result Value Ref Range    QT Interval 287 ms    QTC Interval 423 ms   Comprehensive Metabolic Panel    Collection Time: 04/08/25  7:15 PM    Specimen: Blood   Result Value Ref Range    Glucose 117 (H) 65 - 99 mg/dL    BUN 10 8 - 23 mg/dL    Creatinine 1.01 0.76 - 1.27 mg/dL    Sodium 137 136 - 145 mmol/L    Potassium 3.9 3.5 - 5.2 mmol/L    Chloride 98 98 - 107 mmol/L    CO2 26.4 22.0 - 29.0 mmol/L    Calcium 9.4 8.6 - 10.5 mg/dL    Total Protein 7.4 6.0 - 8.5 g/dL    Albumin 3.7 3.5 - 5.2 g/dL    ALT (SGPT) 10 1 - 41 U/L    AST (SGOT) 33 1 - 40 U/L    Alkaline Phosphatase 121 (H) 39 - 117 U/L    Total Bilirubin 1.5 (H) 0.0 - 1.2 mg/dL    Globulin 3.7 gm/dL    A/G Ratio 1.0 g/dL    BUN/Creatinine Ratio 9.9 7.0 - 25.0    Anion Gap 12.6 5.0 - 15.0 mmol/L    eGFR 83.6 >60.0 mL/min/1.73   Magnesium    Collection Time: 04/08/25  7:15 PM    Specimen: Blood   Result Value Ref Range    Magnesium 1.8 1.6 - 2.4 mg/dL   TSH Rfx On Abnormal To Free T4    Collection  Time: 04/08/25  7:15 PM    Specimen: Blood   Result Value Ref Range    TSH 2.220 0.270 - 4.200 uIU/mL   CBC Auto Differential    Collection Time: 04/08/25  7:15 PM    Specimen: Blood   Result Value Ref Range    WBC 4.88 3.40 - 10.80 10*3/mm3    RBC 4.79 4.14 - 5.80 10*6/mm3    Hemoglobin 13.9 13.0 - 17.7 g/dL    Hematocrit 45.0 37.5 - 51.0 %    MCV 93.9 79.0 - 97.0 fL    MCH 29.0 26.6 - 33.0 pg    MCHC 30.9 (L) 31.5 - 35.7 g/dL    RDW 14.1 12.3 - 15.4 %    RDW-SD 49.2 37.0 - 54.0 fl    MPV 9.4 6.0 - 12.0 fL    Platelets 189 140 - 450 10*3/mm3    Neutrophil % 85.6 (H) 42.7 - 76.0 %    Lymphocyte % 10.5 (L) 19.6 - 45.3 %    Monocyte % 2.9 (L) 5.0 - 12.0 %    Eosinophil % 0.4 0.3 - 6.2 %    Basophil % 0.4 0.0 - 1.5 %    Immature Grans % 0.2 0.0 - 0.5 %    Neutrophils, Absolute 4.18 1.70 - 7.00 10*3/mm3    Lymphocytes, Absolute 0.51 (L) 0.70 - 3.10 10*3/mm3    Monocytes, Absolute 0.14 0.10 - 0.90 10*3/mm3    Eosinophils, Absolute 0.02 0.00 - 0.40 10*3/mm3    Basophils, Absolute 0.02 0.00 - 0.20 10*3/mm3    Immature Grans, Absolute 0.01 0.00 - 0.05 10*3/mm3    nRBC 0.0 0.0 - 0.2 /100 WBC   Gold Top - SST    Collection Time: 04/08/25  7:15 PM   Result Value Ref Range    Extra Tube Hold for add-ons.    Light Blue Top    Collection Time: 04/08/25  7:15 PM   Result Value Ref Range    Extra Tube Hold for add-ons.    Respiratory Panel PCR w/COVID-19(SARS-CoV-2) [facility] In-House, NP Swab in UTM/VTM, 2 HR TAT - Swab, Nasopharynx    Collection Time: 04/08/25  7:33 PM    Specimen: Nasopharynx; Swab   Result Value Ref Range    ADENOVIRUS, PCR Not Detected Not Detected    Coronavirus 229E Not Detected Not Detected    Coronavirus HKU1 Not Detected Not Detected    Coronavirus NL63 Not Detected Not Detected    Coronavirus OC43 Not Detected Not Detected    COVID19 Not Detected Not Detected - Ref. Range    Human Metapneumovirus Not Detected Not Detected    Human Rhinovirus/Enterovirus Not Detected Not Detected     Influenza A PCR Not Detected Not Detected    Influenza B PCR Not Detected Not Detected    Parainfluenza Virus 1 Not Detected Not Detected    Parainfluenza Virus 2 Not Detected Not Detected    Parainfluenza Virus 3 Not Detected Not Detected    Parainfluenza Virus 4 Not Detected Not Detected    RSV, PCR Not Detected Not Detected    Bordetella pertussis pcr Not Detected Not Detected    Bordetella parapertussis PCR Not Detected Not Detected    Chlamydophila pneumoniae PCR Not Detected Not Detected    Mycoplasma pneumo by PCR Not Detected Not Detected   Urinalysis With Culture If Indicated - Straight Cath    Collection Time: 04/08/25  7:33 PM    Specimen: Straight Cath; Urine   Result Value Ref Range    Color, UA Dark Yellow (A) Yellow, Straw    Appearance, UA Hazy (A) Clear    pH, UA <=5.0 5.0 - 8.0    Specific Gravity, UA 1.026 1.005 - 1.030    Glucose, UA Negative Negative    Ketones, UA Trace (A) Negative    Bilirubin, UA Negative Negative    Blood, UA Large (3+) (A) Negative    Protein, UA 30 mg/dL (1+) (A) Negative    Leuk Esterase, UA Small (1+) (A) Negative    Nitrite, UA Negative Negative    Urobilinogen, UA 0.2 E.U./dL 0.2 - 1.0 E.U./dL   Ammonia    Collection Time: 04/08/25  7:33 PM    Specimen: Arm, Left; Blood   Result Value Ref Range    Ammonia 101 (H) 16 - 60 umol/L   Urinalysis, Microscopic Only - Straight Cath    Collection Time: 04/08/25  7:33 PM    Specimen: Straight Cath; Urine   Result Value Ref Range    RBC, UA Too Numerous to Count (A) None Seen, 0-2 /HPF    WBC, UA 6-10 (A) None Seen, 0-2 /HPF    Bacteria, UA Trace (A) None Seen /HPF    Squamous Epithelial Cells, UA 0-2 None Seen, 0-2 /HPF    Transitional Epithelial Cells, UA 0-2 0 - 2 /HPF    Hyaline Casts, UA 0-2 None Seen /LPF    Amorphous Crystals, UA Small/1+ None Seen /HPF    Mucus, UA Small/1+ (A) None Seen, Trace /HPF    Methodology Manual Light Microscopy    POC Lactate    Collection Time: 04/08/25  8:19 PM    Specimen: Blood   Result  Value Ref Range    Lactate 2.1 (C) 0.3 - 2.0 mmol/L     XR Chest 1 View  Result Date: 4/8/2025  Impression: No radiographic evidence of acute cardiopulmonary abnormality. Electronically Signed: Louie Crowley  4/8/2025 7:15 PM EDT  Workstation ID: [id number]    Medications   sodium chloride 0.9 % flush 10 mL (has no administration in time range)   lactulose (CHRONULAC) 10 GM/15ML solution 30 g (has no administration in time range)   sodium chloride 0.9 % flush 10 mL (has no administration in time range)   sodium chloride 0.9 % flush 10 mL (has no administration in time range)   sodium chloride 0.9 % infusion 40 mL (has no administration in time range)   Pharmacy To Dose: Piperacillin-tazobactam (Zosyn) (has no administration in time range)   nitroglycerin (NITROSTAT) SL tablet 0.4 mg (has no administration in time range)   Potassium Replacement - Follow Nurse / BPA Driven Protocol (has no administration in time range)   Magnesium Standard Dose Replacement - Follow Nurse / BPA Driven Protocol (has no administration in time range)   Phosphorus Replacement - Follow Nurse / BPA Driven Protocol (has no administration in time range)   Calcium Replacement - Follow Nurse / BPA Driven Protocol (has no administration in time range)   sennosides-docusate (PERICOLACE) 8.6-50 MG per tablet 2 tablet (has no administration in time range)     And   polyethylene glycol (MIRALAX) packet 17 g (has no administration in time range)     And   bisacodyl (DULCOLAX) EC tablet 5 mg (has no administration in time range)     And   bisacodyl (DULCOLAX) suppository 10 mg (has no administration in time range)   acetaminophen (TYLENOL) suppository 650 mg (650 mg Rectal Given 4/8/25 1934)   piperacillin-tazobactam (ZOSYN) 3.375 g IVPB in 100 mL NS MBP (CD) (3.375 g Intravenous New Bag 4/8/25 2153)       EKG my interpretation normal sinus rhythm tachycardic to 130 patient has PVC evidence of previous inferior infarct QTc of 423 no change from  8/26/2024 other than faster rate                                                 Medical Decision Making  Medical decision making.  Patient IV established monitor placed my review of sinus tachycardia EKG my interpretation normal sinus rhythm tachycardic 130 PVC evidence previous inferior infarct QTc 423 no change from 8/26/2024 other than a faster rate.  Chest x-ray obtained my independent review no evidence of pneumonia or pneumothorax or failure.  Patient triggered sepsis protocol he had labs obtained including cultures and lactate and was started on Zosyn IV.  Pain management review comprehensive metabolic profile unremarkable magnesium was 1.8 TSH was 2.2 CBC unremarkable ammonia level was 101.  The patient's urinalysis obtained patient had small leukocyte significant red cells 6-10 white cells.  Other labs obtained and reviewed by me lactic acid was 2.1 cultures pending.  The patient repeat exam is resting comfortably he had been given Tylenol for his fever.  His heart rate is down to about 105 from 130 temperature was down blood pressure was good at 110/62.  His abdomen was soft without tenderness good bowel sounds there was no peritoneal findings or pulsatile masses.  He had some rhonchi throughout but sats were in the mid 90s.  Etiology fevers not quite clear I think clinically he has pneumonia.  The sore in his buttock area does not really look to have significant cellulitis I do not see any evidence of necrotizing fasciitis or an abscess although could be a potential source.  Chest x-ray did not show pneumonia but I suspect that he does have this.  Respiratory panel is negative.  I do not see any other skin changes.  I do not see any evidence that suggest an acute intra-abdominal process or meningitic process.  He arouses he will talk some but he does not carry on a conversation he will follow commands..  We will try to start him on some lactulose for this ammonia level.  I do not see a history of  cirrhosis anywhere.  Nursing home is not quite send us the medication list yet still waiting on that at this time.  Has been requested.  Records show that he is a DNR.  This is from the nursing home.  I have talked to the hospitalist we discussed the case requested a CT of his abdomen pelvis.  This was requested to look at the liver because of elevated ammonia level.  He will have this on the way to upstairs.  Will attempted to give him some lactulose p.o. if he is unable to do so may need an NG tube or rectal lactulose.  Stable otherwise unremarkable improved ER course.  Lactic acid 2.1 patient does have a history of CHF will go back and look at his records including echo in the past with a extremely poor EF so no IV fluids was 30 kg IV fluids not given because of this.  Lactate is only 2.1.  No hypotension's been noted.  Stable otherwise unremarkable improved ER course    Problems Addressed:  Acute febrile illness: complicated acute illness or injury  Increased ammonia level: complicated acute illness or injury    Amount and/or Complexity of Data Reviewed  Labs: ordered. Decision-making details documented in ED Course.  Radiology: ordered and independent interpretation performed. Decision-making details documented in ED Course.  ECG/medicine tests: ordered and independent interpretation performed. Decision-making details documented in ED Course.    Risk  OTC drugs.  Prescription drug management.  Decision regarding hospitalization.        Final diagnoses:   Acute febrile illness   Increased ammonia level       ED Disposition  ED Disposition       ED Disposition   Decision to Admit    Condition   --    Comment   Level of Care: Med/Surg [1]   Diagnosis: Altered mental status [780.97.ICD-9-CM]   Admitting Physician: MARCELA SULLIVAN [[id number]]   Certification: I Certify That Inpatient Hospital Services Are Medically Necessary For Greater Than 2 Midnights                 No follow-up provider specified.        Medication List      No changes were made to your prescriptions during this visit.            Pardeep Dillon MD  04/08/25 2240      Electronically signed by Pardeep Dillon MD at 04/08/25 2240       Ilsa Jones at 04/08/25 1842          EKG requested       Electronically signed by Ilsa Jones at 04/08/25 1842       Vital Signs (last day)       Date/Time Temp Temp src Pulse Resp BP Patient Position SpO2    04/09/25 0733 98.3 (36.8) Oral 90 22 105/63 Lying 92    04/09/25 0714 100.7 (38.2) -- -- -- -- -- --    04/09/25 0705 -- -- 96 -- -- -- 93    04/09/25 0704 -- -- 97 26 -- -- 93    04/09/25 0700 -- -- -- 26 -- -- --    04/09/25 0412 99.1 (37.3) Oral 95 23 96/57 Lying 92    04/09/25 0356 -- -- 91 18 -- -- 98    04/09/25 0353 -- -- 94 23 -- -- 94    04/09/25 0349 -- -- 91 22 -- -- 94    04/09/25 0103 98.1 (36.7) Oral -- 18 109/71 Lying --    04/08/25 2146 -- -- 108 -- 110/62 -- 100    04/08/25 2131 -- -- 110 -- 109/64 -- 99    04/08/25 2031 -- -- 112 -- 104/61 -- 99    04/08/25 2016 -- -- 115 -- 104/64 -- 100    04/08/25 19:35:03 103.6 (39.8) Rectal -- -- -- -- --    04/08/25 1838 -- -- 135 -- -- -- 94    04/08/25 1837 -- -- 143 -- -- -- 96    04/08/25 18:33:36 -- -- -- -- 114/62 -- --    04/08/25 1833 -- -- 139 -- -- -- --    04/08/25 1832 -- -- -- 22 -- -- --    04/08/25 1824 100.4 (38) Oral 120 -- -- -- 95          Facility-Administered Medications as of 4/9/2025   Medication Dose Route Frequency Provider Last Rate Last Admin    [COMPLETED] acetaminophen (TYLENOL) suppository 650 mg  650 mg Rectal Once Pardeep Dillon MD   650 mg at 04/08/25 1934    acetaminophen (TYLENOL) suppository 650 mg  650 mg Rectal Q8H PRN Danya Sommer MD        [COMPLETED] albumin human 25 % IV SOLN 12.5 g  12.5 g Intravenous Once Harish Fu MD   12.5 g at 04/09/25 0843    aspirin chewable tablet 81 mg  81 mg Oral Daily Harish Fu MD        sennosides-docusate (PERICOLACE) 8.6-50 MG per tablet 2  tablet  2 tablet Oral BID Harish Fu MD        And    polyethylene glycol (MIRALAX) packet 17 g  17 g Oral Daily PRN Harish Fu MD        And    bisacodyl (DULCOLAX) EC tablet 5 mg  5 mg Oral Daily PRN Harish Fu MD        And    bisacodyl (DULCOLAX) suppository 10 mg  10 mg Rectal Daily PRN Harish Fu MD        [COMPLETED] budesonide (PULMICORT) nebulizer solution 0.5 mg  0.5 mg Nebulization Once Harish Fu MD   0.5 mg at 04/09/25 0354    budesonide (PULMICORT) nebulizer solution 0.5 mg  0.5 mg Nebulization BID - RT Harish Fu MD   0.5 mg at 04/09/25 0705    Calcium Replacement - Follow Nurse / BPA Driven Protocol   Not Applicable PRN Harish Fu MD        [COMPLETED] cefepime 2000 mg IVPB in 100 mL NS (MBP)  2,000 mg Intravenous Once Danya Sommer MD   2,000 mg at 04/09/25 1003    cefepime 2000 mg IVPB in 100 mL NS (MBP)  2,000 mg Intravenous Q8H Danya Sommer MD        diphenhydrAMINE (BENADRYL) injection 25 mg  25 mg Intravenous Q6H PRN Harish Fu MD   25 mg at 04/09/25 0034    [COMPLETED] iopamidol (ISOVUE-370) 76 % injection 100 mL  100 mL Intravenous Once in imaging Harish Fu MD   100 mL at 04/08/25 2259    [COMPLETED] ipratropium (ATROVENT) nebulizer solution 0.5 mg  0.5 mg Nebulization Once Harish Fu MD   0.5 mg at 04/09/25 0349    ipratropium (ATROVENT) nebulizer solution 0.5 mg  0.5 mg Nebulization 4x Daily - RT Harish Fu MD   0.5 mg at 04/09/25 0700    lactulose (CHRONULAC) 10 GM/15ML solution 20 g  20 g Oral TID Harish Fu MD   20 g at 04/09/25 1003    Magnesium Standard Dose Replacement - Follow Nurse / BPA Driven Protocol   Not Applicable PRN Harish Fu MD        nitroglycerin (NITROSTAT) SL tablet 0.4 mg  0.4 mg Sublingual Q5 Min PRN Harish Fu MD        nystatin (MYCOSTATIN) powder   Topical Q12H Harish Fu MD   Given at  25 1004    Phosphorus Replacement - Follow Nurse / BPA Driven Protocol   Not Applicable PRHarish Dunn MD        [COMPLETED] piperacillin-tazobactam (ZOSYN) 3.375 g IVPB in 100 mL NS MBP (CD)  3.375 g Intravenous Once Pardeep Dillon MD   3.375 g at 25 2153    Potassium Replacement - Follow Nurse / BPA Driven Protocol   Not Applicable Harish Le MD        sodium chloride 0.9 % flush 10 mL  10 mL Intravenous PRN Pardeep Dillon MD        sodium chloride 0.9 % flush 10 mL  10 mL Intravenous Q12H Harish Fu MD   10 mL at 25 1004    sodium chloride 0.9 % flush 10 mL  10 mL Intravenous PRN Harish Fu MD        sodium chloride 0.9 % infusion 40 mL  40 mL Intravenous PRN Harish Fu MD        [] sodium chloride 0.9 % infusion  100 mL/hr Intravenous Continuous Harish Fu  mL/hr at 25 0037 100 mL/hr at 25 0037     Lab Results (last 48 hours)       Procedure Component Value Units Date/Time    Lactic Acid, Plasma [[id number]] Collected: 25 1016    Specimen: Blood from Arm, Right Updated: 25 1027    Procalcitonin [[id number]] Collected: 25 1016    Specimen: Blood from Arm, Right Updated: 25 1027    STAT Lactic Acid, Reflex [[id number]]  (Normal) Collected: 25 0852    Specimen: Blood from Arm, Right Updated: 25 0933     Lactate 1.6 mmol/L     Comprehensive Metabolic Panel [[id number]]  (Abnormal) Collected: 25 0409    Specimen: Blood from Arm, Right Updated: 25 0444     Glucose 123 mg/dL      BUN 12 mg/dL      Creatinine 0.84 mg/dL      Sodium 133 mmol/L      Potassium 4.3 mmol/L      Chloride 101 mmol/L      CO2 23.6 mmol/L      Calcium 8.5 mg/dL      Total Protein 6.1 g/dL      Albumin 3.0 g/dL      ALT (SGPT) 19 U/L      AST (SGOT) 41 U/L      Alkaline Phosphatase 90 U/L      Total Bilirubin 2.2 mg/dL      Globulin 3.1 gm/dL      A/G Ratio 1.0 g/dL       BUN/Creatinine Ratio 14.3     Anion Gap 8.4 mmol/L      eGFR 98.0 mL/min/1.73     Narrative:      GFR Categories in Chronic Kidney Disease (CKD)      GFR Category          GFR (mL/min/1.73)    Interpretation  G1                     90 or greater         Normal or high (1)  G2                      60-89                Mild decrease (1)  G3a                   45-59                Mild to moderate decrease  G3b                   30-44                Moderate to severe decrease  G4                    15-29                Severe decrease  G5                    14 or less           Kidney failure          (1)In the absence of evidence of kidney disease, neither GFR category G1 or G2 fulfill the criteria for CKD.    eGFR calculation 2021 CKD-EPI creatinine equation, which does not include race as a factor    Magnesium [[id number]]  (Normal) Collected: 04/09/25 0409    Specimen: Blood from Arm, Right Updated: 04/09/25 0444     Magnesium 1.6 mg/dL     Phosphorus [[id number]]  (Normal) Collected: 04/09/25 0409    Specimen: Blood from Arm, Right Updated: 04/09/25 0444     Phosphorus 3.4 mg/dL     TSH [[id number]]  (Normal) Collected: 04/09/25 0409    Specimen: Blood from Arm, Right Updated: 04/09/25 0444     TSH 1.460 uIU/mL     STAT Lactic Acid, Reflex [[id number]]  (Abnormal) Collected: 04/09/25 0409    Specimen: Blood from Arm, Right Updated: 04/09/25 0440     Lactate 2.2 mmol/L     Ammonia [[id number]]  (Normal) Collected: 04/09/25 0409    Specimen: Blood from Arm, Right Updated: 04/09/25 0435     Ammonia 31 umol/L     CBC & Differential [[id number]]  (Abnormal) Collected: 04/09/25 0409    Specimen: Blood from Arm, Right Updated: 04/09/25 0418    Narrative:      The following orders were created for panel order CBC & Differential.  Procedure                               Abnormality         Status                     ---------                               -----------         ------                     CBC Auto  Differential[[id number]]        Abnormal            Final result                 Please view results for these tests on the individual orders.    CBC Auto Differential [[id number]]  (Abnormal) Collected: 04/09/25 0409    Specimen: Blood from Arm, Right Updated: 04/09/25 0418     WBC 11.10 10*3/mm3      RBC 4.17 10*6/mm3      Hemoglobin 12.2 g/dL      Hematocrit 38.4 %      MCV 92.1 fL      MCH 29.3 pg      MCHC 31.8 g/dL      RDW 14.5 %      RDW-SD 48.7 fl      MPV 9.3 fL      Platelets 167 10*3/mm3      Neutrophil % 84.9 %      Lymphocyte % 8.3 %      Monocyte % 5.1 %      Eosinophil % 1.0 %      Basophil % 0.4 %      Immature Grans % 0.3 %      Neutrophils, Absolute 9.43 10*3/mm3      Lymphocytes, Absolute 0.92 10*3/mm3      Monocytes, Absolute 0.57 10*3/mm3      Eosinophils, Absolute 0.11 10*3/mm3      Basophils, Absolute 0.04 10*3/mm3      Immature Grans, Absolute 0.03 10*3/mm3      nRBC 0.0 /100 WBC     STAT Lactic Acid, Reflex [[id number]]  (Abnormal) Collected: 04/09/25 0047    Specimen: Blood from Arm, Right Updated: 04/09/25 0119     Lactate 2.3 mmol/L     Urinalysis, Microscopic Only - Straight Cath [[id number]]  (Abnormal) Collected: 04/08/25 1933    Specimen: Urine from Straight Cath Updated: 04/08/25 2031     RBC, UA Too Numerous to Count /HPF      WBC, UA 6-10 /HPF      Bacteria, UA Trace /HPF      Squamous Epithelial Cells, UA 0-2 /HPF      Transitional Epithelial Cells, UA 0-2 /HPF      Hyaline Casts, UA 0-2 /LPF      Amorphous Crystals, UA Small/1+ /HPF      Mucus, UA Small/1+ /HPF      Methodology Manual Light Microscopy    Urine Culture - Urine, Straight Cath [[id number]] Collected: 04/08/25 1933    Specimen: Urine from Straight Cath Updated: 04/08/25 2031    Respiratory Panel PCR w/COVID-19(SARS-CoV-2) [facility], NP Swab in UTM/VTM, 2 HR TAT - Swab, Nasopharynx [[id number]]  (Normal) Collected: 04/08/25 1933    Specimen: Swab from Nasopharynx Updated: 04/08/25 2027      ADENOVIRUS, PCR Not Detected     Coronavirus 229E Not Detected     Coronavirus HKU1 Not Detected     Coronavirus NL63 Not Detected     Coronavirus OC43 Not Detected     COVID19 Not Detected     Human Metapneumovirus Not Detected     Human Rhinovirus/Enterovirus Not Detected     Influenza A PCR Not Detected     Influenza B PCR Not Detected     Parainfluenza Virus 1 Not Detected     Parainfluenza Virus 2 Not Detected     Parainfluenza Virus 3 Not Detected     Parainfluenza Virus 4 Not Detected     RSV, PCR Not Detected     Bordetella pertussis pcr Not Detected     Bordetella parapertussis PCR Not Detected     Chlamydophila pneumoniae PCR Not Detected     Mycoplasma pneumo by PCR Not Detected    Narrative:      In the setting of a positive respiratory panel with a viral infection PLUS a negative procalcitonin without other underlying concern for bacterial infection, consider observing off antibiotics or discontinuation of antibiotics and continue supportive care. If the respiratory panel is positive for atypical bacterial infection (Bordetella pertussis, Chlamydophila pneumoniae, or Mycoplasma pneumoniae), consider antibiotic de-escalation to target atypical bacterial infection.    POC Lactate [[id number]]  (Abnormal) Collected: 04/08/25 2019    Specimen: Blood Updated: 04/08/25 2025     Lactate 2.1 mmol/L      Comment: Serial Number: [id number]:  [id number]       TSH Rfx On Abnormal To Free T4 [[id number]]  (Normal) Collected: 04/08/25 1915    Specimen: Blood Updated: 04/08/25 2012     TSH 2.220 uIU/mL     Comprehensive Metabolic Panel [[id number]]  (Abnormal) Collected: 04/08/25 1915    Specimen: Blood Updated: 04/08/25 2008     Glucose 117 mg/dL      BUN 10 mg/dL      Creatinine 1.01 mg/dL      Sodium 137 mmol/L      Potassium 3.9 mmol/L      Chloride 98 mmol/L      CO2 26.4 mmol/L      Calcium 9.4 mg/dL      Total Protein 7.4 g/dL      Albumin 3.7 g/dL      ALT (SGPT) 10 U/L      AST (SGOT) 33 U/L      Alkaline  Phosphatase 121 U/L      Total Bilirubin 1.5 mg/dL      Globulin 3.7 gm/dL      A/G Ratio 1.0 g/dL      BUN/Creatinine Ratio 9.9     Anion Gap 12.6 mmol/L      eGFR 83.6 mL/min/1.73     Narrative:      GFR Categories in Chronic Kidney Disease (CKD)      GFR Category          GFR (mL/min/1.73)    Interpretation  G1                     90 or greater         Normal or high (1)  G2                      60-89                Mild decrease (1)  G3a                   45-59                Mild to moderate decrease  G3b                   30-44                Moderate to severe decrease  G4                    15-29                Severe decrease  G5                    14 or less           Kidney failure          (1)In the absence of evidence of kidney disease, neither GFR category G1 or G2 fulfill the criteria for CKD.    eGFR calculation 2021 CKD-EPI creatinine equation, which does not include race as a factor    Magnesium [[id number]]  (Normal) Collected: 04/08/25 1915    Specimen: Blood Updated: 04/08/25 2008     Magnesium 1.8 mg/dL     Ammonia [[id number]]  (Abnormal) Collected: 04/08/25 1933    Specimen: Blood from Arm, Left Updated: 04/08/25 1958     Ammonia 101 umol/L     Extra Tubes [[id number]] Collected: 04/08/25 1915    Specimen: Blood, Venous Line Updated: 04/08/25 1946    Narrative:      The following orders were created for panel order Extra Tubes.  Procedure                               Abnormality         Status                     ---------                               -----------         ------                     Gold Top - SST[[id number]]                                   Final result               Light Blue Top[[id number]]                                   Final result                 Please view results for these tests on the individual orders.    Gold Top - SST [[id number]] Collected: 04/08/25 1915    Specimen: Blood Updated: 04/08/25 1946     Extra Tube Hold for add-ons.     Comment: Auto resulted.        Light Blue Top [[id number]] Collected: 04/08/25 1915    Specimen: Blood Updated: 04/08/25 1946     Extra Tube Hold for add-ons.     Comment: Auto resulted       Urinalysis With Culture If Indicated - Straight Cath [[id number]]  (Abnormal) Collected: 04/08/25 1933    Specimen: Urine from Straight Cath Updated: 04/08/25 1945     Color, UA Dark Yellow     Appearance, UA Hazy     pH, UA <=5.0     Specific Gravity, UA 1.026     Glucose, UA Negative     Ketones, UA Trace     Bilirubin, UA Negative     Blood, UA Large (3+)     Protein, UA 30 mg/dL (1+)     Leuk Esterase, UA Small (1+)     Nitrite, UA Negative     Urobilinogen, UA 0.2 E.U./dL    Narrative:      In absence of clinical symptoms, the presence of pyuria, bacteria, and/or nitrites on the urinalysis result does not correlate with infection.    CBC & Differential [[id number]]  (Abnormal) Collected: 04/08/25 1915    Specimen: Blood Updated: 04/08/25 1942    Narrative:      The following orders were created for panel order CBC & Differential.  Procedure                               Abnormality         Status                     ---------                               -----------         ------                     CBC Auto Differential[[id number]]        Abnormal            Final result                 Please view results for these tests on the individual orders.    CBC Auto Differential [[id number]]  (Abnormal) Collected: 04/08/25 1915    Specimen: Blood Updated: 04/08/25 1942     WBC 4.88 10*3/mm3      RBC 4.79 10*6/mm3      Hemoglobin 13.9 g/dL      Hematocrit 45.0 %      MCV 93.9 fL      MCH 29.0 pg      MCHC 30.9 g/dL      RDW 14.1 %      RDW-SD 49.2 fl      MPV 9.4 fL      Platelets 189 10*3/mm3      Neutrophil % 85.6 %      Lymphocyte % 10.5 %      Monocyte % 2.9 %      Eosinophil % 0.4 %      Basophil % 0.4 %      Immature Grans % 0.2 %      Neutrophils, Absolute 4.18 10*3/mm3      Lymphocytes, Absolute 0.51 10*3/mm3      Monocytes, Absolute 0.14 10*3/mm3       Eosinophils, Absolute 0.02 10*3/mm3      Basophils, Absolute 0.02 10*3/mm3      Immature Grans, Absolute 0.01 10*3/mm3      nRBC 0.0 /100 WBC     Blood Culture - Blood, Arm, Left [[id number]] Collected: 04/08/25 1933    Specimen: Blood from Arm, Left Updated: 04/08/25 1938    Blood Culture - Blood, Arm, Right [[id number]] Collected: 04/08/25 1915    Specimen: Blood from Arm, Right Updated: 04/08/25 1938    POC Glucose Once [[id number]]  (Normal) Collected: 04/08/25 1843    Specimen: Blood Updated: 04/08/25 1845     Glucose 101 mg/dL      Comment: Serial Number: [id number]:  [id number]             Imaging Results (All)       Procedure Component Value Units Date/Time    CT Head Without Contrast [[id number]] Collected: 04/09/25 0807     Updated: 04/09/25 0812    Narrative:      CT HEAD WO CONTRAST    Date of Exam: 4/9/2025 7:51 AM EDT    Indication: AMS, hx of prior stroke.    Comparison: None available.    Technique: Axial CT images were obtained of the head without contrast administration.  Coronal reconstructions were performed.  Automated exposure control and iterative reconstruction methods were used.      Findings:  No acute intracranial hemorrhage or extra-axial collection is identified. There is a large area encephalomalacia along the right cerebral hemisphere with ex-vacuo dilatation of the right lateral ventricle. There is no evidence of acute mass effect or   midline shift. The basal cisterns appear patent. The gray-white differentiation appears preserved.    There are changes from right frontal craniotomy. The paranasal sinuses and mastoid air cells are well aerated.      Impression:      Impression:  1.No acute intracranial process identified.  2.Large area of encephalomalacia along right cerebral hemisphere.        Electronically Signed: Tal Mcallister MD    4/9/2025 8:10 AM EDT    Workstation ID: [id number]    XR Abdomen KUB [[id number]] Collected: 04/09/25 0315     Updated: 04/09/25 0318    Narrative:       XR ABDOMEN KUB    Date of Exam: 4/9/2025 2:43 AM EDT    Indication: NG tube placement    Comparison: CT abdomen and pelvis 4/8/2025.    Findings:  See impression.      Impression:      Impression:  Gastric suction tube terminates in the stomach.          Electronically Signed: Sulaiman Zabala MD    4/9/2025 3:16 AM EDT    Workstation ID: [id number]    CT Abdomen Pelvis With Contrast [[id number]] Collected: 04/08/25 2316     Updated: 04/08/25 2325    Narrative:      CT ABDOMEN PELVIS W CONTRAST    Date of Exam: 4/8/2025 10:38 PM EDT    Indication: Acute febrile illness elevated ammonia level please look at liver.    Comparison: 8/26/2024.    Technique: Axial CT images were obtained of the abdomen and pelvis following the uneventful intravenous administration of iodinated contrast. Sagittal and coronal reconstructions were performed.  Automated exposure control and iterative reconstruction   methods were used.        Findings:  Heart size is normal. There is fluid in the distal esophagus. There is dependent bibasilar atelectasis. There appears to be superimposed patchy airspace disease in the lung bases that could reflect pneumonia or artifact from motion and atelectasis.   Correlate with symptoms. No acute findings in the superficial soft tissues. No acute osseous abnormality or destructive bone lesion. There is severe thoracolumbar spondylosis. There is multilevel neuroforaminal and spinal canal narrowing. 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    The liver, gallbladder, bile ducts, pancreas, mid and distal stomach, duodenum, spleen and adrenal glands appear within normal limits. There are numerous bilateral nonobstructing kidney stones. There is a large stone in the left renal pelvis measuring up   to 11 mm diameter. There is mild left-sided hydronephrosis and there is moderate peripelvic fat stranding at the left kidney. This could be related to intermittent obstruction or urinary tract infection. The  distal ureters appear unremarkable. The   urinary bladder and prostate gland are within normal limits. Normal appendix. There is colonic diverticulosis. No small bowel distention. There is no aneurysm. No ascites, pneumoperitoneum or lymphadenopathy.      Impression:      Impression:  1.A large stone in the left renal pelvis measuring up to 11 mm diameter. There is mild left-sided hydronephrosis and there is moderate peripelvic fat stranding at the left kidney. This could be related to intermittent obstruction or urinary tract   infection.  2.Numerous bilateral nonobstructing kidney stones.  3.Colonic diverticulosis.  4.Severe thoracolumbar spondylosis.  5.Bibasilar atelectasis with possible superimposed pneumonia. Correlate with symptoms.                Electronically Signed: Sulaiman Zabala MD    2025 11:23 PM EDT    Workstation ID: [id number]    XR Chest 1 View [[id number]] Collected: 25     Updated: 25    Narrative:      XR CHEST 1 VW    Date of Exam: 2025 6:51 PM EDT    Indication: Altered mental status    Comparison: Chest radiograph and chest CT 2024    Findings:      Mediastinum: Cardiac silhouette appears unchanged and not enlarged. Loop recorder projects over the left chest.    Lungs: The lungs appear clear without focal consolidation appreciated.    Pleura: No pleural effusion or pneumothorax.    Bones and soft tissues: No acute, displaced fracture seen.        Impression:      Impression:  No radiographic evidence of acute cardiopulmonary abnormality.        Electronically Signed: Louie Crowley    2025 7:15 PM EDT    Workstation ID: [id number]             Physician Progress Notes (last 48 hours)        Danya Sommer MD at 25 0732              [facility] [address] MEDICINE SERVICE  DAILY PROGRESS NOTE    NAME: Kevin Hurtado  : 1960  MRN: [id number]      LOS: 1 day     PROVIDER OF SERVICE: Danya Sommer MD    Chief Complaint: Altered mental status    Subjective:    Patient lying in bed alert and oriented x 2.  Interval History:    Patient seen and evaluated at bedside.   H&P, labs and imaging reviewed Case discussed with patient's nurse  Treatment plan discussed with patient. All questions addressed.     Review of Systems:   All 21 ROS were negative except mentioned above.    Objective:     Vital Signs  Temp:  [98.1 °F (36.7 °C)-103.6 °F (39.8 °C)] 100.7 °F (38.2 °C)  Heart Rate:  [] 96  Resp:  [18-26] 26  BP: ()/(57-71) 96/57  Flow (L/min) (Oxygen Therapy):  [5] 5   Body mass index is 29.27 kg/m².    Physical Exam   General: No acute distress, appears stated age  Neuro: Awake and alert, oriented x2, no focal deficits appreciated  Head: Atraumatic, normocephalic  HEENT: EOMI, anicteric, normal sclerae and conjunctivae, moist mucus membranes, NG tube in place  Neck: supple, no lymphadenopathy  CV: RRR, soft heart sounds, no murmurs appreciated, no peripheral edema  Pulm: Decreased breath sounds, no increased work of breathing, no adventitious sounds  Abd: Soft, nontender, nondistended  Skin: Warm, dry and i left buttock wound  Psych: Appropriate mood and affect    Scheduled Meds   albumin human, 12.5 g, Intravenous, Once  aspirin, 81 mg, Oral, Daily  budesonide, 0.5 mg, Nebulization, BID - RT  cefTRIAXone, 2,000 mg, Intravenous, Q24H  ipratropium, 0.5 mg, Nebulization, 4x Daily - RT  lactulose, 20 g, Oral, TID  nystatin, , Topical, Q12H  senna-docusate sodium, 2 tablet, Oral, BID  sodium chloride, 10 mL, Intravenous, Q12H       PRN Meds     senna-docusate sodium **AND** polyethylene glycol **AND** bisacodyl **AND** bisacodyl    Calcium Replacement - Follow Nurse / BPA Driven Protocol    diphenhydrAMINE    Magnesium Standard Dose Replacement - Follow Nurse / BPA Driven Protocol    nitroglycerin    Phosphorus Replacement - Follow Nurse / BPA Driven Protocol    Potassium Replacement - Follow Nurse / BPA Driven Protocol    [COMPLETED] Insert Peripheral IV **AND**  sodium chloride    sodium chloride    sodium chloride   Infusions  sodium chloride, 100 mL/hr, Last Rate: 100 mL/hr (04/09/25 0037)          Diagnostic Data    Results from last 7 days   Lab Units 04/09/25  0409   WBC 10*3/mm3 11.10*   HEMOGLOBIN g/dL 12.2*   HEMATOCRIT % 38.4   PLATELETS 10*3/mm3 167   GLUCOSE mg/dL 123*   CREATININE mg/dL 0.84   BUN mg/dL 12   SODIUM mmol/L 133*   POTASSIUM mmol/L 4.3   AST (SGOT) U/L 41*   ALT (SGPT) U/L 19   ALK PHOS U/L 90   BILIRUBIN mg/dL 2.2*   ANION GAP mmol/L 8.4       XR Abdomen KUB  Result Date: 4/9/2025  Impression: Gastric suction tube terminates in the stomach. Electronically Signed: Sulaiman Zablaa MD  4/9/2025 3:16 AM EDT  Workstation ID: [id number]    CT Abdomen Pelvis With Contrast  Result Date: 4/8/2025  Impression: 1.A large stone in the left renal pelvis measuring up to 11 mm diameter. There is mild left-sided hydronephrosis and there is moderate peripelvic fat stranding at the left kidney. This could be related to intermittent obstruction or urinary tract infection. 2.Numerous bilateral nonobstructing kidney stones. 3.Colonic diverticulosis. 4.Severe thoracolumbar spondylosis. 5.Bibasilar atelectasis with possible superimposed pneumonia. Correlate with symptoms. Electronically Signed: Sulaiman Zabala MD  4/8/2025 11:23 PM EDT  Workstation ID: [id number]    XR Chest 1 View  Result Date: 4/8/2025  Impression: No radiographic evidence of acute cardiopulmonary abnormality. Electronically Signed: Louie Crowley  4/8/2025 7:15 PM EDT  Workstation ID: [id number]      Interval results reviewed.    Assessment/Plan:   Sepsis  11 mm stone in the left renal pelvis  Left hydronephrosis  Pyelonephritis  Possible pneumonia  Nephrolithiasis  Encephalopathy  Hyperbilirubinemia  Left buttock wound present on admission  CVA/right cerebral encephalomalacia with left-sided hemiparesis  Ch HFrEF  Hypertension  Hyperlipidemia  Colonic diverticulosis  Severe thrombolumbar  spondylosis  Nursing home patient    Blood and urine culture pending.  Patient still spiked fever will DC ceftriaxone and start patient on cefepime for pyelonephritis, sepsis and possible pneumonia  We will check procalcitonin  White blood count trending up, lactic acid 2.2.  Ammonia back to normal  We will check procalcitonin  Obtain right upper ultrasound to rule out cirrhosis or any liver pathology.  Continue to hold antihypertensives due to low blood pressure  Continue lactulose 30 g p.o. twice daily to have 2-3 soft bowel movements  DC NG tube.    Treatment plan discussed with RN.         VTE Prophylaxis:  No VTE prophylaxis order currently exists.         Code status is   Code Status and Medical Interventions: CPR (Attempt to Resuscitate); Full Support; full code presumed, patient is disoriented   Ordered at: 25 9986     Code Status (Patient has no pulse and is not breathing):    CPR (Attempt to Resuscitate)     Medical Interventions (Patient has pulse or is breathing):    Full Support     Comments:    full code presumed, patient is disoriented       Plan for disposition:     Barriers to Discharge: Acuity of medical conditions  Anticipated Date of Discharge: 2025  Place of Discharge: Nursing home      Time: 40 minutes     Signature: Electronically signed by Danya Sommer MD, 25, 07:33 EDT.  [facility] Hospitalist Team     Electronically signed by Danya Sommer MD at 25 0940          Consult Notes (last 48 hours)        Dayton Melendez APRN at 25 0641        Consult Orders    1. Inpatient Urology Consult [[id number]] ordered by Harish Fu MD at 25 5973              Attestation signed by Ran Tapia MD at 25 0743      I have reviewed this documentation and agree.                           [facility]      Patient Identification:  NAME:  Kevin Hurtado  Age:  64 y.o.   Sex:  male   :  1960   MRN:  [id number]     Chief complaint:  ams    History of present illness:      From NH, new to [facility]. Here with AMS found to have intermittent obstructive stone. Sepsis and uti    In hospital:   .A large stone in the left renal pelvis measuring up to 11 mm diameter. There is mild left-sided hydronephrosis and there is moderate peripelvic fat stranding at the left kidney. This could be related to intermittent obstruction or urinary tract   Asked to see    Past medical history:  Past Medical History:   Diagnosis Date    Elevated cholesterol     GERD (gastroesophageal reflux disease)     Stroke        Past surgical history:  Past Surgical History:   Procedure Laterality Date    ENDOSCOPY N/A 8/27/2024    Procedure: ESOPHAGOGASTRODUODENOSCOPY with biopsy x 3 areas;  Surgeon: KG Arizmendi MD;  [facility]: [facility] ENDOSCOPY;  Service: Gastroenterology;  Laterality: N/A;  post op:       Allergies:  Patient has no known allergies.    Home medications:  Medications Prior to Admission   Medication Sig Dispense Refill Last Dose/Taking    aluminum-magnesium hydroxide-simethicone (MAALOX/MYLANTA) 200-200-20 MG/5ML suspension Take 30 mL by mouth Every 8 (Eight) Hours As Needed for Indigestion or Heartburn.       amoxicillin-clavulanate (AUGMENTIN) 875-125 MG per tablet Take 1 tablet by mouth 2 (Two) Times a Day. 14 tablet 0     aspirin 81 MG chewable tablet Chew 1 tablet Every Morning.       atorvastatin (LIPITOR) 40 MG tablet Take 1 tablet by mouth Every Night.       baclofen (LIORESAL) 10 MG tablet Take 1 tablet by mouth 3 (Three) Times a Day.       calcium carbonate (TUMS) 500 MG chewable tablet Chew 1 tablet 3 (Three) Times a Day.       carboxymethylcellulose (REFRESH PLUS) 0.5 % solution Administer 2 drops to both eyes 2 (Two) Times a Day.       ezetimibe (ZETIA) 10 MG tablet Take 1 tablet by mouth Every Morning.       famotidine (PEPCID) 20 MG tablet Take 1 tablet by mouth 2 (Two) Times a Day.       gabapentin (NEURONTIN) 400 MG capsule Take 1 capsule  by mouth 3 (Three) Times a Day.       HYDROcodone-acetaminophen (NORCO)  MG per tablet Take 1 tablet by mouth 3 (Three) Times a Day. 6 tablet 0     ketoconazole (NIZORAL) 2 % shampoo Apply 1 Application topically to the appropriate area as directed 1 (One) Time Per Week. Apply to scalp topically on dayshift every Wednesday, Saturday for tinea versicolor for 6 months       Lidocaine 4 % aerosol Place 1 Application on the skin as directed by provider 2 (Two) Times a Day. Apply to R 4th toe distal topically every morning and at bedtime for pain       meclizine (ANTIVERT) 25 MG tablet Take 1 tablet by mouth Every 8 (Eight) Hours As Needed for Dizziness.       melatonin 1 MG tablet Take 3 tablets by mouth Every Night.       naloxone (NARCAN) 4 MG/0.1ML nasal spray Call 911. Don't prime. Presto in 1 nostril for overdose. Repeat in 2-3 minutes in other nostril if no or minimal breathing/responsiveness. 2 each 0     ondansetron (ZOFRAN) 4 MG tablet Take 1 tablet by mouth Every 6 (Six) Hours As Needed for Nausea or Vomiting.       pantoprazole (Protonix) 40 MG EC tablet Take 1 tablet by mouth 2 (Two) Times a Day. 60 tablet 0     Scopolamine 1 MG/3DAYS patch Place 1 patch on the skin as directed by provider Every 72 (Seventy-Two) Hours.       Skin Protectants, Misc. (Eucerin) cream Apply 1 Application topically to the appropriate area as directed 2 (Two) Times a Day. Apply to bilateral arms topically every shift for eczema       tamsulosin (FLOMAX) 0.4 MG capsule 24 hr capsule Take 1 capsule by mouth Every Morning.       white petrolatum ointment Apply 1 Application topically to the appropriate area as directed every night at bedtime. Instill 1 application in both eyes at bedtime for eye maintenance 1/2 inch ribbon outer lid           Hospital medications:  albumin human, 12.5 g, Intravenous, Once  aspirin, 81 mg, Oral, Daily  budesonide, 0.5 mg, Nebulization, BID - RT  cefTRIAXone, 2,000 mg, Intravenous,  Q24H  ipratropium, 0.5 mg, Nebulization, 4x Daily - RT  lactulose, 20 g, Oral, TID  nystatin, , Topical, Q12H  senna-docusate sodium, 2 tablet, Oral, BID  sodium chloride, 10 mL, Intravenous, Q12H      sodium chloride, 100 mL/hr, Last Rate: 100 mL/hr (25 0037)        senna-docusate sodium **AND** polyethylene glycol **AND** bisacodyl **AND** bisacodyl    Calcium Replacement - Follow Nurse / BPA Driven Protocol    diphenhydrAMINE    Magnesium Standard Dose Replacement - Follow Nurse / BPA Driven Protocol    nitroglycerin    Phosphorus Replacement - Follow Nurse / BPA Driven Protocol    Potassium Replacement - Follow Nurse / BPA Driven Protocol    [COMPLETED] Insert Peripheral IV **AND** sodium chloride    sodium chloride    sodium chloride    Family history:  No family history on file.    Social history:  Social History     Tobacco Use    Smoking status: Never    Smokeless tobacco: Former     Types: Snuff   Vaping Use    Vaping status: Never Used   Substance Use Topics    Alcohol use: Not Currently     Comment: two bottles of liquor once a month    Drug use: No       Review of systems:      Positive for:  nothing  Negative for:  chest pain, cough, sob, o/w neg    Objective:  TMax 24 hours:   Temp (24hrs), Av.3 °F (37.9 °C), Min:98.1 °F (36.7 °C), Max:103.6 °F (39.8 °C)      Vitals Ranges:   Temp:  [98.1 °F (36.7 °C)-103.6 °F (39.8 °C)] 99.1 °F (37.3 °C)  Heart Rate:  [] 95  Resp:  [18-23] 23  BP: ()/(57-71) 96/57    Intake/Output Last 3 shifts:  No intake/output data recorded.     Physical Exam:    General Appearance:    Alert, cooperative, NAD   Back:     No CVA tenderness   Lungs:     Respirations unlabored, no wheezing    Heart:    RRR, intact peripheral pulses   Abdomen:     Soft, NDNT, no masses, no guarding   Neuro/Psych:   Orientation intact, mood/affect pleasant       Results review:   I reviewed the patient's new clinical results.    Data review:  Lab Results (last 24 hours)        Procedure Component Value Units Date/Time    Comprehensive Metabolic Panel [[id number]]  (Abnormal) Collected: 04/09/25 0409    Specimen: Blood from Arm, Right Updated: 04/09/25 0444     Glucose 123 mg/dL      BUN 12 mg/dL      Creatinine 0.84 mg/dL      Sodium 133 mmol/L      Potassium 4.3 mmol/L      Chloride 101 mmol/L      CO2 23.6 mmol/L      Calcium 8.5 mg/dL      Total Protein 6.1 g/dL      Albumin 3.0 g/dL      ALT (SGPT) 19 U/L      AST (SGOT) 41 U/L      Alkaline Phosphatase 90 U/L      Total Bilirubin 2.2 mg/dL      Globulin 3.1 gm/dL      A/G Ratio 1.0 g/dL      BUN/Creatinine Ratio 14.3     Anion Gap 8.4 mmol/L      eGFR 98.0 mL/min/1.73     Narrative:      GFR Categories in Chronic Kidney Disease (CKD)      GFR Category          GFR (mL/min/1.73)    Interpretation  G1                     90 or greater         Normal or high (1)  G2                      60-89                Mild decrease (1)  G3a                   45-59                Mild to moderate decrease  G3b                   30-44                Moderate to severe decrease  G4                    15-29                Severe decrease  G5                    14 or less           Kidney failure          (1)In the absence of evidence of kidney disease, neither GFR category G1 or G2 fulfill the criteria for CKD.    eGFR calculation 2021 CKD-EPI creatinine equation, which does not include race as a factor    Magnesium [[id number]]  (Normal) Collected: 04/09/25 0409    Specimen: Blood from Arm, Right Updated: 04/09/25 0444     Magnesium 1.6 mg/dL     Phosphorus [[id number]]  (Normal) Collected: 04/09/25 0409    Specimen: Blood from Arm, Right Updated: 04/09/25 0444     Phosphorus 3.4 mg/dL     TSH [[id number]]  (Normal) Collected: 04/09/25 0409    Specimen: Blood from Arm, Right Updated: 04/09/25 0444     TSH 1.460 uIU/mL     STAT Lactic Acid, Reflex [[id number]]  (Abnormal) Collected: 04/09/25 0409    Specimen: Blood from Arm, Right Updated: 04/09/25 0440      Lactate 2.2 mmol/L     Ammonia [[id number]]  (Normal) Collected: 04/09/25 0409    Specimen: Blood from Arm, Right Updated: 04/09/25 0435     Ammonia 31 umol/L     CBC & Differential [[id number]]  (Abnormal) Collected: 04/09/25 0409    Specimen: Blood from Arm, Right Updated: 04/09/25 0418    Narrative:      The following orders were created for panel order CBC & Differential.  Procedure                               Abnormality         Status                     ---------                               -----------         ------                     CBC Auto Differential[[id number]]        Abnormal            Final result                 Please view results for these tests on the individual orders.    CBC Auto Differential [[id number]]  (Abnormal) Collected: 04/09/25 0409    Specimen: Blood from Arm, Right Updated: 04/09/25 0418     WBC 11.10 10*3/mm3      RBC 4.17 10*6/mm3      Hemoglobin 12.2 g/dL      Hematocrit 38.4 %      MCV 92.1 fL      MCH 29.3 pg      MCHC 31.8 g/dL      RDW 14.5 %      RDW-SD 48.7 fl      MPV 9.3 fL      Platelets 167 10*3/mm3      Neutrophil % 84.9 %      Lymphocyte % 8.3 %      Monocyte % 5.1 %      Eosinophil % 1.0 %      Basophil % 0.4 %      Immature Grans % 0.3 %      Neutrophils, Absolute 9.43 10*3/mm3      Lymphocytes, Absolute 0.92 10*3/mm3      Monocytes, Absolute 0.57 10*3/mm3      Eosinophils, Absolute 0.11 10*3/mm3      Basophils, Absolute 0.04 10*3/mm3      Immature Grans, Absolute 0.03 10*3/mm3      nRBC 0.0 /100 WBC     STAT Lactic Acid, Reflex [[id number]]  (Abnormal) Collected: 04/09/25 0047    Specimen: Blood from Arm, Right Updated: 04/09/25 0119     Lactate 2.3 mmol/L     Urinalysis, Microscopic Only - Straight Cath [[id number]]  (Abnormal) Collected: 04/08/25 1933    Specimen: Urine from Straight Cath Updated: 04/08/25 2031     RBC, UA Too Numerous to Count /HPF      WBC, UA 6-10 /HPF      Bacteria, UA Trace /HPF      Squamous Epithelial Cells, UA 0-2 /HPF      Transitional  Epithelial Cells, UA 0-2 /HPF      Hyaline Casts, UA 0-2 /LPF      Amorphous Crystals, UA Small/1+ /HPF      Mucus, UA Small/1+ /HPF      Methodology Manual Light Microscopy    Urine Culture - Urine, Straight Cath [[id number]] Collected: 04/08/25 1933    Specimen: Urine from Straight Cath Updated: 04/08/25 2031    Respiratory Panel PCR w/COVID-19(SARS-CoV-2) KEON/THIAGO/MJ/PAD/COR/BIGG In-House, NP Swab in UTM/VTM, 2 HR TAT - Swab, Nasopharynx [[id number]]  (Normal) Collected: 04/08/25 1933    Specimen: Swab from Nasopharynx Updated: 04/08/25 2027     ADENOVIRUS, PCR Not Detected     Coronavirus 229E Not Detected     Coronavirus HKU1 Not Detected     Coronavirus NL63 Not Detected     Coronavirus OC43 Not Detected     COVID19 Not Detected     Human Metapneumovirus Not Detected     Human Rhinovirus/Enterovirus Not Detected     Influenza A PCR Not Detected     Influenza B PCR Not Detected     Parainfluenza Virus 1 Not Detected     Parainfluenza Virus 2 Not Detected     Parainfluenza Virus 3 Not Detected     Parainfluenza Virus 4 Not Detected     RSV, PCR Not Detected     Bordetella pertussis pcr Not Detected     Bordetella parapertussis PCR Not Detected     Chlamydophila pneumoniae PCR Not Detected     Mycoplasma pneumo by PCR Not Detected    Narrative:      In the setting of a positive respiratory panel with a viral infection PLUS a negative procalcitonin without other underlying concern for bacterial infection, consider observing off antibiotics or discontinuation of antibiotics and continue supportive care. If the respiratory panel is positive for atypical bacterial infection (Bordetella pertussis, Chlamydophila pneumoniae, or Mycoplasma pneumoniae), consider antibiotic de-escalation to target atypical bacterial infection.    POC Lactate [[id number]]  (Abnormal) Collected: 04/08/25 2019    Specimen: Blood Updated: 04/08/25 2025     Lactate 2.1 mmol/L      Comment: Serial Number: [id number]:  [id number]       TSH Rfx  On Abnormal To Free T4 [[id number]]  (Normal) Collected: 04/08/25 1915    Specimen: Blood Updated: 04/08/25 2012     TSH 2.220 uIU/mL     Comprehensive Metabolic Panel [[id number]]  (Abnormal) Collected: 04/08/25 1915    Specimen: Blood Updated: 04/08/25 2008     Glucose 117 mg/dL      BUN 10 mg/dL      Creatinine 1.01 mg/dL      Sodium 137 mmol/L      Potassium 3.9 mmol/L      Chloride 98 mmol/L      CO2 26.4 mmol/L      Calcium 9.4 mg/dL      Total Protein 7.4 g/dL      Albumin 3.7 g/dL      ALT (SGPT) 10 U/L      AST (SGOT) 33 U/L      Alkaline Phosphatase 121 U/L      Total Bilirubin 1.5 mg/dL      Globulin 3.7 gm/dL      A/G Ratio 1.0 g/dL      BUN/Creatinine Ratio 9.9     Anion Gap 12.6 mmol/L      eGFR 83.6 mL/min/1.73     Narrative:      GFR Categories in Chronic Kidney Disease (CKD)      GFR Category          GFR (mL/min/1.73)    Interpretation  G1                     90 or greater         Normal or high (1)  G2                      60-89                Mild decrease (1)  G3a                   45-59                Mild to moderate decrease  G3b                   30-44                Moderate to severe decrease  G4                    15-29                Severe decrease  G5                    14 or less           Kidney failure          (1)In the absence of evidence of kidney disease, neither GFR category G1 or G2 fulfill the criteria for CKD.    eGFR calculation 2021 CKD-EPI creatinine equation, which does not include race as a factor    Magnesium [[id number]]  (Normal) Collected: 04/08/25 1915    Specimen: Blood Updated: 04/08/25 2008     Magnesium 1.8 mg/dL     Ammonia [[id number]]  (Abnormal) Collected: 04/08/25 1933    Specimen: Blood from Arm, Left Updated: 04/08/25 1958     Ammonia 101 umol/L     Extra Tubes [[id number]] Collected: 04/08/25 1915    Specimen: Blood, Venous Line Updated: 04/08/25 1946    Narrative:      The following orders were created for panel order Extra Tubes.  Procedure                                Abnormality         Status                     ---------                               -----------         ------                     Gold Top - SST[[id number]]                                   Final result               Light Blue Top[[id number]]                                   Final result                 Please view results for these tests on the individual orders.    Gold Top - SST [[id number]] Collected: 04/08/25 1915    Specimen: Blood Updated: 04/08/25 1946     Extra Tube Hold for add-ons.     Comment: Auto resulted.       Light Blue Top [[id number]] Collected: 04/08/25 1915    Specimen: Blood Updated: 04/08/25 1946     Extra Tube Hold for add-ons.     Comment: Auto resulted       Urinalysis With Culture If Indicated - Straight Cath [[id number]]  (Abnormal) Collected: 04/08/25 1933    Specimen: Urine from Straight Cath Updated: 04/08/25 1945     Color, UA Dark Yellow     Appearance, UA Hazy     pH, UA <=5.0     Specific Gravity, UA 1.026     Glucose, UA Negative     Ketones, UA Trace     Bilirubin, UA Negative     Blood, UA Large (3+)     Protein, UA 30 mg/dL (1+)     Leuk Esterase, UA Small (1+)     Nitrite, UA Negative     Urobilinogen, UA 0.2 E.U./dL    Narrative:      In absence of clinical symptoms, the presence of pyuria, bacteria, and/or nitrites on the urinalysis result does not correlate with infection.    CBC & Differential [[id number]]  (Abnormal) Collected: 04/08/25 1915    Specimen: Blood Updated: 04/08/25 1942    Narrative:      The following orders were created for panel order CBC & Differential.  Procedure                               Abnormality         Status                     ---------                               -----------         ------                     CBC Auto Differential[[id number]]        Abnormal            Final result                 Please view results for these tests on the individual orders.    CBC Auto Differential [[id number]]  (Abnormal) Collected: 04/08/25  1915    Specimen: Blood Updated: 04/08/25 1942     WBC 4.88 10*3/mm3      RBC 4.79 10*6/mm3      Hemoglobin 13.9 g/dL      Hematocrit 45.0 %      MCV 93.9 fL      MCH 29.0 pg      MCHC 30.9 g/dL      RDW 14.1 %      RDW-SD 49.2 fl      MPV 9.4 fL      Platelets 189 10*3/mm3      Neutrophil % 85.6 %      Lymphocyte % 10.5 %      Monocyte % 2.9 %      Eosinophil % 0.4 %      Basophil % 0.4 %      Immature Grans % 0.2 %      Neutrophils, Absolute 4.18 10*3/mm3      Lymphocytes, Absolute 0.51 10*3/mm3      Monocytes, Absolute 0.14 10*3/mm3      Eosinophils, Absolute 0.02 10*3/mm3      Basophils, Absolute 0.02 10*3/mm3      Immature Grans, Absolute 0.01 10*3/mm3      nRBC 0.0 /100 WBC     Blood Culture - Blood, Arm, Left [[id number]] Collected: 04/08/25 1933    Specimen: Blood from Arm, Left Updated: 04/08/25 1938    Blood Culture - Blood, Arm, Right [[id number]] Collected: 04/08/25 1915    Specimen: Blood from Arm, Right Updated: 04/08/25 1938    POC Glucose Once [[id number]]  (Normal) Collected: 04/08/25 1843    Specimen: Blood Updated: 04/08/25 1845     Glucose 101 mg/dL      Comment: Serial Number: [id number]:  [id number]                Imaging:  Imaging Results (Last 24 Hours)       Procedure Component Value Units Date/Time    XR Abdomen KUB [[id number]] Collected: 04/09/25 0315     Updated: 04/09/25 0318    Narrative:      XR ABDOMEN KUB    Date of Exam: 4/9/2025 2:43 AM EDT    Indication: NG tube placement    Comparison: CT abdomen and pelvis 4/8/2025.    Findings:  See impression.      Impression:      Impression:  Gastric suction tube terminates in the stomach.          Electronically Signed: Sulaiman Zabala MD    4/9/2025 3:16 AM EDT    Workstation ID: [id number]    CT Abdomen Pelvis With Contrast [[id number]] Collected: 04/08/25 2316     Updated: 04/08/25 2325    Narrative:      CT ABDOMEN PELVIS W CONTRAST    Date of Exam: 4/8/2025 10:38 PM EDT    Indication: Acute febrile illness elevated ammonia level please  look at liver.    Comparison: 8/26/2024.    Technique: Axial CT images were obtained of the abdomen and pelvis following the uneventful intravenous administration of iodinated contrast. Sagittal and coronal reconstructions were performed.  Automated exposure control and iterative reconstruction   methods were used.        Findings:  Heart size is normal. There is fluid in the distal esophagus. There is dependent bibasilar atelectasis. There appears to be superimposed patchy airspace disease in the lung bases that could reflect pneumonia or artifact from motion and atelectasis.   Correlate with symptoms. No acute findings in the superficial soft tissues. No acute osseous abnormality or destructive bone lesion. There is severe thoracolumbar spondylosis. There is multilevel neuroforaminal and spinal canal narrowing. 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    The liver, gallbladder, bile ducts, pancreas, mid and distal stomach, duodenum, spleen and adrenal glands appear within normal limits. There are numerous bilateral nonobstructing kidney stones. There is a large stone in the left renal pelvis measuring up   to 11 mm diameter. There is mild left-sided hydronephrosis and there is moderate peripelvic fat stranding at the left kidney. This could be related to intermittent obstruction or urinary tract infection. The distal ureters appear unremarkable. The   urinary bladder and prostate gland are within normal limits. Normal appendix. There is colonic diverticulosis. No small bowel distention. There is no aneurysm. No ascites, pneumoperitoneum or lymphadenopathy.      Impression:      Impression:  1.A large stone in the left renal pelvis measuring up to 11 mm diameter. There is mild left-sided hydronephrosis and there is moderate peripelvic fat stranding at the left kidney. This could be related to intermittent obstruction or urinary tract   infection.  2.Numerous bilateral nonobstructing kidney  stones.  3.Colonic diverticulosis.  4.Severe thoracolumbar spondylosis.  5.Bibasilar atelectasis with possible superimposed pneumonia. Correlate with symptoms.                Electronically Signed: Sulaiman Zabala MD    4/8/2025 11:23 PM EDT    Workstation ID: [id number]    XR Chest 1 View [[id number]] Collected: 04/08/25 1914     Updated: 04/08/25 1917    Narrative:      XR CHEST 1 VW    Date of Exam: 4/8/2025 6:51 PM EDT    Indication: Altered mental status    Comparison: Chest radiograph and chest CT 8/26/2024    Findings:      Mediastinum: Cardiac silhouette appears unchanged and not enlarged. Loop recorder projects over the left chest.    Lungs: The lungs appear clear without focal consolidation appreciated.    Pleura: No pleural effusion or pneumothorax.    Bones and soft tissues: No acute, displaced fracture seen.        Impression:      Impression:  No radiographic evidence of acute cardiopulmonary abnormality.        Electronically Signed: Louie Crowley    4/8/2025 7:15 PM EDT    Workstation ID: [id number]               Assessment:     Large stone in the left renal pelvis measuring up to 11 mm diameter. There is mild left-sided hydronephrosis and there is moderate peripelvic fat stranding at the left kidney. This could be related to intermittent obstruction or urinary trac    Plan:     Npo  Primary to follow cx specific abx  Cysto left stent  RBO explained but pt has altered mental status    Dayton Melendez, PATRICK  04/09/25  06:42 EDT              Electronically signed by Ran Tapia MD at 04/09/25 0716

## 2025-04-09 NOTE — THERAPY DISCHARGE NOTE
Chart reviewed, pt is from LTC and is bedbound requiring dependent A for ADLs and connie lift at baseline.  No skliled therapy needs indicated at this time. Therapy orders completed.

## 2025-04-09 NOTE — PROGRESS NOTES
Wound Initial Evaluation   [facility]     Patient Name: Kevin Hurtado  : 1960  MRN: [id number]  Today's Date: 2025 Room Number: 4127/1      Admit Date: 2025  Attending: Danya Sommer MD    Consult Requested By: Dr Sommer    Reason For Consult: stage 2 sacralPI    Chief Complaint: 64-year-old male admitted to the hospital with difficulty speaking and altered mental status changes.  Patient has had a previous stroke with paralysis.  Patient reports that he is mostly bedbound.  Reports that he has significant discomfort when turned and positioned and does not like to turn.  Patient is unsure of how long the pressure injuries have been present.  He also reports a skin rash and he is unsure of how long that has been present or any treatment.  Known    Visit Dx:    ICD-10-CM ICD-9-CM   1. Acute febrile illness  R50.9 780.60   2. Increased ammonia level  R79.89 790.6       Altered mental status    Pressure injury of sacral region, stage 2    Yeast infection of the skin      History:   Past Medical History:   Diagnosis Date    Elevated cholesterol     GERD (gastroesophageal reflux disease)     Stroke      Past Surgical History:   Procedure Laterality Date    ENDOSCOPY N/A 2024    Procedure: ESOPHAGOGASTRODUODENOSCOPY with biopsy x 3 areas;  Surgeon: KG Arizmendi MD;  Location: [facility] ENDOSCOPY;  Service: Gastroenterology;  Laterality: N/A;  post op:     Social History     Socioeconomic History    Marital status: Single   Tobacco Use    Smoking status: Never    Smokeless tobacco: Former     Types: Snuff   Vaping Use    Vaping status: Never Used   Substance and Sexual Activity    Alcohol use: Not Currently     Comment: two bottles of liquor once a month    Drug use: No    Sexual activity: Defer     Partners: Female       Allergies:  No Known Allergies    Medications:    Current Facility-Administered Medications:     acetaminophen (TYLENOL) suppository 650 mg, 650 mg, Rectal, Q8H PRN, Danya Sommer MD     aspirin chewable tablet 81 mg, 81 mg, Oral, Daily, Harish Fu MD    sennosides-docusate (PERICOLACE) 8.6-50 MG per tablet 2 tablet, 2 tablet, Oral, BID **AND** polyethylene glycol (MIRALAX) packet 17 g, 17 g, Oral, Daily PRN **AND** bisacodyl (DULCOLAX) EC tablet 5 mg, 5 mg, Oral, Daily PRN **AND** bisacodyl (DULCOLAX) suppository 10 mg, 10 mg, Rectal, Daily PRN, Harish Fu MD    budesonide (PULMICORT) nebulizer solution 0.5 mg, 0.5 mg, Nebulization, BID - RT, Harish Fu MD, 0.5 mg at 04/09/25 0705    Calcium Replacement - Follow Nurse / BPA Driven Protocol, , Not Applicable, PRN, Harish Fu MD    cefepime 2000 mg IVPB in 100 mL NS (MBP), 2,000 mg, Intravenous, Q8H, KemalDanya MD    diphenhydrAMINE (BENADRYL) injection 25 mg, 25 mg, Intravenous, Q6H PRN, Harish Fu MD, 25 mg at 04/09/25 0034    ipratropium (ATROVENT) nebulizer solution 0.5 mg, 0.5 mg, Nebulization, 4x Daily - RT, Harish Fu MD, 0.5 mg at 04/09/25 0700    lactulose (CHRONULAC) 10 GM/15ML solution 20 g, 20 g, Oral, TID, Harish Fu MD, 20 g at 04/09/25 1003    Magnesium Standard Dose Replacement - Follow Nurse / BPA Driven Protocol, , Not Applicable, PRN, Harish Fu MD    miconazole (MICOTIN) 2 % powder 1 Application, 1 Application, Topical, Q12H, Stephanie Friend APRN    nitroglycerin (NITROSTAT) SL tablet 0.4 mg, 0.4 mg, Sublingual, Q5 Min PRN, Harish Fu MD    nystatin (MYCOSTATIN) powder, , Topical, Q12H, Harish Fu MD, Given at 04/09/25 1004    Phosphorus Replacement - Follow Nurse / BPA Driven Protocol, , Not Applicable, Tank VELOZ Sukhjinder S, MD    Potassium Replacement - Follow Nurse / BPA Driven Protocol, , Not Applicable, Tank VELOZ Sukhjinder S, MD    [COMPLETED] Insert Peripheral IV, , , Once **AND** sodium chloride 0.9 % flush 10 mL, 10 mL, Intravenous, PRN, Pardeep Dillon MD    sodium chloride 0.9 % flush 10 mL,  "10 mL, Intravenous, Q12H, Harish Fu MD, 10 mL at 04/09/25 1004    sodium chloride 0.9 % flush 10 mL, 10 mL, Intravenous, PRN, Harish Fu MD    sodium chloride 0.9 % infusion 40 mL, 40 mL, Intravenous, Tank VELOZ Sukhjinder S, MD    Results Review:  Lab Results (last 48 hours)       Procedure Component Value Units Date/Time    Procalcitonin [[id number]]  (Abnormal) Collected: 04/09/25 1016    Specimen: Blood from Arm, Right Updated: 04/09/25 1112     Procalcitonin 23.80 ng/mL     Narrative:      As a Marker for Sepsis (Non-Neonates):    1. <0.5 ng/mL represents a low risk of severe sepsis and/or septic shock.  2. >2 ng/mL represents a high risk of severe sepsis and/or septic shock.    As a Marker for Lower Respiratory Tract Infections that require antibiotic therapy:    PCT on Admission    Antibiotic Therapy       6-12 Hrs later    >0.5                Strongly Recommended  >0.25 - <0.5        Recommended   0.1 - 0.25          Discouraged              Remeasure/reassess PCT  <0.1                Strongly Discouraged     Remeasure/reassess PCT    As 28 day mortality risk marker: \"Change in Procalcitonin Result\" (>80% or <=80%) if Day 0 (or Day 1) and Day 4 values are available. Refer to http://www.[facility]-pct-calculator.com    Change in PCT <=80%  A decrease of PCT levels below or equal to 80% defines a positive change in PCT test result representing a higher risk for 28-day all-cause mortality of patients diagnosed with severe sepsis for septic shock.    Change in PCT >80%  A decrease of PCT levels of more than 80% defines a negative change in PCT result representing a lower risk for 28-day all-cause mortality of patients diagnosed with severe sepsis or septic shock.       Lactic Acid, Plasma [[id number]]  (Normal) Collected: 04/09/25 1016    Specimen: Blood from Arm, Right Updated: 04/09/25 1103     Lactate 1.8 mmol/L     STAT Lactic Acid, Reflex [[id number]]  (Normal) Collected: 04/09/25 0852 "    Specimen: Blood from Arm, Right Updated: 04/09/25 0933     Lactate 1.6 mmol/L     Comprehensive Metabolic Panel [[id number]]  (Abnormal) Collected: 04/09/25 0409    Specimen: Blood from Arm, Right Updated: 04/09/25 0444     Glucose 123 mg/dL      BUN 12 mg/dL      Creatinine 0.84 mg/dL      Sodium 133 mmol/L      Potassium 4.3 mmol/L      Chloride 101 mmol/L      CO2 23.6 mmol/L      Calcium 8.5 mg/dL      Total Protein 6.1 g/dL      Albumin 3.0 g/dL      ALT (SGPT) 19 U/L      AST (SGOT) 41 U/L      Alkaline Phosphatase 90 U/L      Total Bilirubin 2.2 mg/dL      Globulin 3.1 gm/dL      A/G Ratio 1.0 g/dL      BUN/Creatinine Ratio 14.3     Anion Gap 8.4 mmol/L      eGFR 98.0 mL/min/1.73     Narrative:      GFR Categories in Chronic Kidney Disease (CKD)      GFR Category          GFR (mL/min/1.73)    Interpretation  G1                     90 or greater         Normal or high (1)  G2                      60-89                Mild decrease (1)  G3a                   45-59                Mild to moderate decrease  G3b                   30-44                Moderate to severe decrease  G4                    15-29                Severe decrease  G5                    14 or less           Kidney failure          (1)In the absence of evidence of kidney disease, neither GFR category G1 or G2 fulfill the criteria for CKD.    eGFR calculation 2021 CKD-EPI creatinine equation, which does not include race as a factor    Magnesium [[id number]]  (Normal) Collected: 04/09/25 0409    Specimen: Blood from Arm, Right Updated: 04/09/25 0444     Magnesium 1.6 mg/dL     Phosphorus [[id number]]  (Normal) Collected: 04/09/25 0409    Specimen: Blood from Arm, Right Updated: 04/09/25 0444     Phosphorus 3.4 mg/dL     TSH [[id number]]  (Normal) Collected: 04/09/25 0409    Specimen: Blood from Arm, Right Updated: 04/09/25 0444     TSH 1.460 uIU/mL     STAT Lactic Acid, Reflex [[id number]]  (Abnormal) Collected: 04/09/25 0409    Specimen: Blood  from Arm, Right Updated: 04/09/25 0440     Lactate 2.2 mmol/L     Ammonia [[id number]]  (Normal) Collected: 04/09/25 0409    Specimen: Blood from Arm, Right Updated: 04/09/25 0435     Ammonia 31 umol/L     CBC & Differential [[id number]]  (Abnormal) Collected: 04/09/25 0409    Specimen: Blood from Arm, Right Updated: 04/09/25 0418    Narrative:      The following orders were created for panel order CBC & Differential.  Procedure                               Abnormality         Status                     ---------                               -----------         ------                     CBC Auto Differential[[id number]]        Abnormal            Final result                 Please view results for these tests on the individual orders.    CBC Auto Differential [[id number]]  (Abnormal) Collected: 04/09/25 0409    Specimen: Blood from Arm, Right Updated: 04/09/25 0418     WBC 11.10 10*3/mm3      RBC 4.17 10*6/mm3      Hemoglobin 12.2 g/dL      Hematocrit 38.4 %      MCV 92.1 fL      MCH 29.3 pg      MCHC 31.8 g/dL      RDW 14.5 %      RDW-SD 48.7 fl      MPV 9.3 fL      Platelets 167 10*3/mm3      Neutrophil % 84.9 %      Lymphocyte % 8.3 %      Monocyte % 5.1 %      Eosinophil % 1.0 %      Basophil % 0.4 %      Immature Grans % 0.3 %      Neutrophils, Absolute 9.43 10*3/mm3      Lymphocytes, Absolute 0.92 10*3/mm3      Monocytes, Absolute 0.57 10*3/mm3      Eosinophils, Absolute 0.11 10*3/mm3      Basophils, Absolute 0.04 10*3/mm3      Immature Grans, Absolute 0.03 10*3/mm3      nRBC 0.0 /100 WBC     STAT Lactic Acid, Reflex [[id number]]  (Abnormal) Collected: 04/09/25 0047    Specimen: Blood from Arm, Right Updated: 04/09/25 0119     Lactate 2.3 mmol/L     Urinalysis, Microscopic Only - Straight Cath [[id number]]  (Abnormal) Collected: 04/08/25 1933    Specimen: Urine from Straight Cath Updated: 04/08/25 2031     RBC, UA Too Numerous to Count /HPF      WBC, UA 6-10 /HPF      Bacteria, UA Trace /HPF      Squamous  Epithelial Cells, UA 0-2 /HPF      Transitional Epithelial Cells, UA 0-2 /HPF      Hyaline Casts, UA 0-2 /LPF      Amorphous Crystals, UA Small/1+ /HPF      Mucus, UA Small/1+ /HPF      Methodology Manual Light Microscopy    Urine Culture - Urine, Straight Cath [[id number]] Collected: 04/08/25 1933    Specimen: Urine from Straight Cath Updated: 04/08/25 2031    Respiratory Panel PCR w/COVID-19(SARS-CoV-2) KEON/THIAOG/MJ/PAD/COR/BIGG In-House, NP Swab in UTM/VTM, 2 HR TAT - Swab, Nasopharynx [[id number]]  (Normal) Collected: 04/08/25 1933    Specimen: Swab from Nasopharynx Updated: 04/08/25 2027     ADENOVIRUS, PCR Not Detected     Coronavirus 229E Not Detected     Coronavirus HKU1 Not Detected     Coronavirus NL63 Not Detected     Coronavirus OC43 Not Detected     COVID19 Not Detected     Human Metapneumovirus Not Detected     Human Rhinovirus/Enterovirus Not Detected     Influenza A PCR Not Detected     Influenza B PCR Not Detected     Parainfluenza Virus 1 Not Detected     Parainfluenza Virus 2 Not Detected     Parainfluenza Virus 3 Not Detected     Parainfluenza Virus 4 Not Detected     RSV, PCR Not Detected     Bordetella pertussis pcr Not Detected     Bordetella parapertussis PCR Not Detected     Chlamydophila pneumoniae PCR Not Detected     Mycoplasma pneumo by PCR Not Detected    Narrative:      In the setting of a positive respiratory panel with a viral infection PLUS a negative procalcitonin without other underlying concern for bacterial infection, consider observing off antibiotics or discontinuation of antibiotics and continue supportive care. If the respiratory panel is positive for atypical bacterial infection (Bordetella pertussis, Chlamydophila pneumoniae, or Mycoplasma pneumoniae), consider antibiotic de-escalation to target atypical bacterial infection.    POC Lactate [[id number]]  (Abnormal) Collected: 04/08/25 2019    Specimen: Blood Updated: 04/08/25 2025     Lactate 2.1 mmol/L      Comment: Serial  Number: [id number]:  [id number]       TSH Rfx On Abnormal To Free T4 [[id number]]  (Normal) Collected: 04/08/25 1915    Specimen: Blood Updated: 04/08/25 2012     TSH 2.220 uIU/mL     Comprehensive Metabolic Panel [[id number]]  (Abnormal) Collected: 04/08/25 1915    Specimen: Blood Updated: 04/08/25 2008     Glucose 117 mg/dL      BUN 10 mg/dL      Creatinine 1.01 mg/dL      Sodium 137 mmol/L      Potassium 3.9 mmol/L      Chloride 98 mmol/L      CO2 26.4 mmol/L      Calcium 9.4 mg/dL      Total Protein 7.4 g/dL      Albumin 3.7 g/dL      ALT (SGPT) 10 U/L      AST (SGOT) 33 U/L      Alkaline Phosphatase 121 U/L      Total Bilirubin 1.5 mg/dL      Globulin 3.7 gm/dL      A/G Ratio 1.0 g/dL      BUN/Creatinine Ratio 9.9     Anion Gap 12.6 mmol/L      eGFR 83.6 mL/min/1.73     Narrative:      GFR Categories in Chronic Kidney Disease (CKD)      GFR Category          GFR (mL/min/1.73)    Interpretation  G1                     90 or greater         Normal or high (1)  G2                      60-89                Mild decrease (1)  G3a                   45-59                Mild to moderate decrease  G3b                   30-44                Moderate to severe decrease  G4                    15-29                Severe decrease  G5                    14 or less           Kidney failure          (1)In the absence of evidence of kidney disease, neither GFR category G1 or G2 fulfill the criteria for CKD.    eGFR calculation 2021 CKD-EPI creatinine equation, which does not include race as a factor    Magnesium [[id number]]  (Normal) Collected: 04/08/25 1915    Specimen: Blood Updated: 04/08/25 2008     Magnesium 1.8 mg/dL     Ammonia [[id number]]  (Abnormal) Collected: 04/08/25 1933    Specimen: Blood from Arm, Left Updated: 04/08/25 1958     Ammonia 101 umol/L     Extra Tubes [[id number]] Collected: 04/08/25 1915    Specimen: Blood, Venous Line Updated: 04/08/25 1946    Narrative:      The following orders were created for  panel order Extra Tubes.  Procedure                               Abnormality         Status                     ---------                               -----------         ------                     Gold Top - SST[[id number]]                                   Final result               Light Blue Top[[id number]]                                   Final result                 Please view results for these tests on the individual orders.    Gold Top - SST [[id number]] Collected: 04/08/25 1915    Specimen: Blood Updated: 04/08/25 1946     Extra Tube Hold for add-ons.     Comment: Auto resulted.       Light Blue Top [[id number]] Collected: 04/08/25 1915    Specimen: Blood Updated: 04/08/25 1946     Extra Tube Hold for add-ons.     Comment: Auto resulted       Urinalysis With Culture If Indicated - Straight Cath [[id number]]  (Abnormal) Collected: 04/08/25 1933    Specimen: Urine from Straight Cath Updated: 04/08/25 1945     Color, UA Dark Yellow     Appearance, UA Hazy     pH, UA <=5.0     Specific Gravity, UA 1.026     Glucose, UA Negative     Ketones, UA Trace     Bilirubin, UA Negative     Blood, UA Large (3+)     Protein, UA 30 mg/dL (1+)     Leuk Esterase, UA Small (1+)     Nitrite, UA Negative     Urobilinogen, UA 0.2 E.U./dL    Narrative:      In absence of clinical symptoms, the presence of pyuria, bacteria, and/or nitrites on the urinalysis result does not correlate with infection.    CBC & Differential [[id number]]  (Abnormal) Collected: 04/08/25 1915    Specimen: Blood Updated: 04/08/25 1942    Narrative:      The following orders were created for panel order CBC & Differential.  Procedure                               Abnormality         Status                     ---------                               -----------         ------                     CBC Auto Differential[[id number]]        Abnormal            Final result                 Please view results for these tests on the individual orders.    CBC Auto  Differential [[id number]]  (Abnormal) Collected: 04/08/25 1915    Specimen: Blood Updated: 04/08/25 1942     WBC 4.88 10*3/mm3      RBC 4.79 10*6/mm3      Hemoglobin 13.9 g/dL      Hematocrit 45.0 %      MCV 93.9 fL      MCH 29.0 pg      MCHC 30.9 g/dL      RDW 14.1 %      RDW-SD 49.2 fl      MPV 9.4 fL      Platelets 189 10*3/mm3      Neutrophil % 85.6 %      Lymphocyte % 10.5 %      Monocyte % 2.9 %      Eosinophil % 0.4 %      Basophil % 0.4 %      Immature Grans % 0.2 %      Neutrophils, Absolute 4.18 10*3/mm3      Lymphocytes, Absolute 0.51 10*3/mm3      Monocytes, Absolute 0.14 10*3/mm3      Eosinophils, Absolute 0.02 10*3/mm3      Basophils, Absolute 0.02 10*3/mm3      Immature Grans, Absolute 0.01 10*3/mm3      nRBC 0.0 /100 WBC     Blood Culture - Blood, Arm, Left [[id number]] Collected: 04/08/25 1933    Specimen: Blood from Arm, Left Updated: 04/08/25 1938    Blood Culture - Blood, Arm, Right [[id number]] Collected: 04/08/25 1915    Specimen: Blood from Arm, Right Updated: 04/08/25 1938    POC Glucose Once [[id number]]  (Normal) Collected: 04/08/25 1843    Specimen: Blood Updated: 04/08/25 1845     Glucose 101 mg/dL      Comment: Serial Number: [id number]:  [id number]             Imaging Results (Last 72 Hours)       Procedure Component Value Units Date/Time    US Liver [[id number]] Resulted: 04/09/25 1120     Updated: 04/09/25 1120    XR Chest 1 View [[id number]] Resulted: 04/09/25 1118     Updated: 04/09/25 1118    CT Head Without Contrast [[id number]] Collected: 04/09/25 0807     Updated: 04/09/25 0812    Narrative:      CT HEAD WO CONTRAST    Date of Exam: 4/9/2025 7:51 AM EDT    Indication: AMS, hx of prior stroke.    Comparison: None available.    Technique: Axial CT images were obtained of the head without contrast administration.  Coronal reconstructions were performed.  Automated exposure control and iterative reconstruction methods were used.      Findings:  No acute intracranial hemorrhage or  extra-axial collection is identified. There is a large area encephalomalacia along the right cerebral hemisphere with ex-vacuo dilatation of the right lateral ventricle. There is no evidence of acute mass effect or   midline shift. The basal cisterns appear patent. The gray-white differentiation appears preserved.    There are changes from right frontal craniotomy. The paranasal sinuses and mastoid air cells are well aerated.      Impression:      Impression:  1.No acute intracranial process identified.  2.Large area of encephalomalacia along right cerebral hemisphere.        Electronically Signed: Tal Mcallister MD    4/9/2025 8:10 AM EDT    Workstation ID: [id number]    XR Abdomen KUB [[id number]] Collected: 04/09/25 0315     Updated: 04/09/25 0318    Narrative:      XR ABDOMEN KUB    Date of Exam: 4/9/2025 2:43 AM EDT    Indication: NG tube placement    Comparison: CT abdomen and pelvis 4/8/2025.    Findings:  See impression.      Impression:      Impression:  Gastric suction tube terminates in the stomach.          Electronically Signed: Sulaiman Zabala MD    4/9/2025 3:16 AM EDT    Workstation ID: [id number]    CT Abdomen Pelvis With Contrast [[id number]] Collected: 04/08/25 2316     Updated: 04/08/25 2325    Narrative:      CT ABDOMEN PELVIS W CONTRAST    Date of Exam: 4/8/2025 10:38 PM EDT    Indication: Acute febrile illness elevated ammonia level please look at liver.    Comparison: 8/26/2024.    Technique: Axial CT images were obtained of the abdomen and pelvis following the uneventful intravenous administration of iodinated contrast. Sagittal and coronal reconstructions were performed.  Automated exposure control and iterative reconstruction   methods were used.        Findings:  Heart size is normal. There is fluid in the distal esophagus. There is dependent bibasilar atelectasis. There appears to be superimposed patchy airspace disease in the lung bases that could reflect pneumonia or artifact from motion  and atelectasis.   Correlate with symptoms. No acute findings in the superficial soft tissues. No acute osseous abnormality or destructive bone lesion. There is severe thoracolumbar spondylosis. There is multilevel neuroforaminal and spinal canal narrowing. 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    The liver, gallbladder, bile ducts, pancreas, mid and distal stomach, duodenum, spleen and adrenal glands appear within normal limits. There are numerous bilateral nonobstructing kidney stones. There is a large stone in the left renal pelvis measuring up   to 11 mm diameter. There is mild left-sided hydronephrosis and there is moderate peripelvic fat stranding at the left kidney. This could be related to intermittent obstruction or urinary tract infection. The distal ureters appear unremarkable. The   urinary bladder and prostate gland are within normal limits. Normal appendix. There is colonic diverticulosis. No small bowel distention. There is no aneurysm. No ascites, pneumoperitoneum or lymphadenopathy.      Impression:      Impression:  1.A large stone in the left renal pelvis measuring up to 11 mm diameter. There is mild left-sided hydronephrosis and there is moderate peripelvic fat stranding at the left kidney. This could be related to intermittent obstruction or urinary tract   infection.  2.Numerous bilateral nonobstructing kidney stones.  3.Colonic diverticulosis.  4.Severe thoracolumbar spondylosis.  5.Bibasilar atelectasis with possible superimposed pneumonia. Correlate with symptoms.                Electronically Signed: Sulaiman Zabala MD    4/8/2025 11:23 PM EDT    Workstation ID: [id number]    XR Chest 1 View [[id number]] Collected: 04/08/25 1914     Updated: 04/08/25 1917    Narrative:      XR CHEST 1 VW    Date of Exam: 4/8/2025 6:51 PM EDT    Indication: Altered mental status    Comparison: Chest radiograph and chest CT 8/26/2024    Findings:      Mediastinum: Cardiac silhouette appears unchanged  and not enlarged. Loop recorder projects over the left chest.    Lungs: The lungs appear clear without focal consolidation appreciated.    Pleura: No pleural effusion or pneumothorax.    Bones and soft tissues: No acute, displaced fracture seen.        Impression:      Impression:  No radiographic evidence of acute cardiopulmonary abnormality.        Electronically Signed: Louie Crowley    4/8/2025 7:15 PM EDT    Workstation ID: [id number]            Review of Systems:  Review of Systems   Constitutional: Negative.    HENT: Negative.     Eyes: Negative.    Cardiovascular: Negative.    Gastrointestinal: Negative.    Endocrine: Negative.    Genitourinary: Negative.    Musculoskeletal:  Positive for arthralgias.   Skin:  Positive for rash and wound.   Psychiatric/Behavioral: Negative.         Physical Assessment:  Wound Bilateral posterior gluteal Pressure Injury (Active)                                                                      Patient presents with a red papular rash consistent with yeast to the skin folds particularly the right axilla.  The left axilla has a very mild rash noted.  It is primarily on the right side.  He does expect breast burning to the area.    Patient presents with a stage II pressure injury to the left of the sacral buttocks area.  The wound has a small to moderate amount of serosanguineous exudate.  The wound is more middermal.  It is pink.  It appears as almost several wounds clustered together.  Overall size is approximately 4 cm x 1-1/2 cm.    Recommend treating and place a silicone border foam dressing on the sacrum.  Patient was very difficult to turn in position.  It required 2 people    Final diagnosis  Yeast rash to skin  Stage II sacral pressure injury    Recommendation and Plan  Will recommend treating the pressure injury with a silicone border foam dressing.  We will also place the patient on agility offloading surface.  Patient is strongly encouraged to allow staff to turn  and position change him every couple hours and educated patient on the importance of position changes and offloading pressure points.  Will add a 2% miconazole powder and can place a soft pillowcase between the skin folds of the axilla to separate the skin.  Also the low-air-loss therapy from the agility surface will help control the moisture issue.  Will continue to follow as needed    PATRICK Hewitt   4/9/2025   11:43 EDT

## 2025-04-09 NOTE — PLAN OF CARE
Goal Outcome Evaluation:                                     Pt VSS. Pt NPO for procedure later today. NG removed per order. No complaints of pain. Fall precautions maintained. Pt transferred to , report called to nurse. Plan of care ongoing

## 2025-04-09 NOTE — OP NOTE
Urology Operative Note    4/9/2025    Kevin Hurtado  64 y.o.  1960  male  [id number]      Surgeon(s) and Role:  Sulaiman Loza MD - Primary     Preoperative Diagnosis: 11 mm left UPJ stone, sepsis    Postoperative Diagnosis: Same    Complications: None    Procedures:  Cystoscopy  Left ureteral stone manipulation  Left ureteral stent placement  Fluoroscopy with interpretation     Indications   Kevin Hurtado is a 64 y.o. male who was found to have left millimeter left UPJ stone with hydronephrosis and sepsis.  He was added on for stent    During the informed consent process, the procedure was discussed in detail including the risks of bleeding, infection, ureteral injury, failure and need for secondary procedures    Findings    Fluoroscopy with interpretation: Left UPJ stone clearly seen bumped back into the lower pole stent placed with curl in the upper pole    Description of procedure:  The patient was properly identified in the preoperative holding area and taken to the operating room where general anesthesia was induced. The patient was placed in the cystolithotomy position and prepped and draped in a sterile fashion. The patient was given antibiotics intravenously before the start of the surgery. After ensuring that all of the required equipment was ready and available a surgical time out was performed.     A 21 [place] rigid cystoscope was inserted into the bladder under direct visualization.   A Sensor wire was passed up the ureter under fluoroscopic guidance.  Dual lumen catheter was used to bump the stone back into the kidney    A 7 Fr x 26 cm stent was placed under direct and fluoroscopic visualization with good curl in the renal pelvis as well as the bladder.  The bladder was then emptied and scope removed.    There were no apparent complications. The patient woke up in the operating room and was taken to the recovery room in stable condition.     I was present and scrubbed for the entire  procedure.     Specimens: None    Estimated Blood Loss: minimal      Plan   -Return to floor  -Follow cultures  -We will arrange for outpatient stone management         Sulaiman Loza MD  [facility]  [address], [address]  [place] [address] [phone]

## 2025-04-09 NOTE — PROGRESS NOTES
[facility] MEDICINE SERVICE  DAILY PROGRESS NOTE    NAME: Kevin Hurtado  : 1960  MRN: [id number]      LOS: 1 day     PROVIDER OF SERVICE: Danya Sommer MD    Chief Complaint: Altered mental status    Subjective:   Patient lying in bed alert and oriented x 2.  Interval History:    Patient seen and evaluated at bedside.   H&P, labs and imaging reviewed Case discussed with patient's nurse  Treatment plan discussed with patient. All questions addressed.     Review of Systems:   All 21 ROS were negative except mentioned above.    Objective:     Vital Signs  Temp:  [98.1 °F (36.7 °C)-103.6 °F (39.8 °C)] 100.7 °F (38.2 °C)  Heart Rate:  [] 96  Resp:  [18-26] 26  BP: ()/(57-71) 96/57  Flow (L/min) (Oxygen Therapy):  [5] 5   Body mass index is 29.27 kg/m².    Physical Exam   General: No acute distress, appears stated age  Neuro: Awake and alert, oriented x2, no focal deficits appreciated  Head: Atraumatic, normocephalic  HEENT: EOMI, anicteric, normal sclerae and conjunctivae, moist mucus membranes, NG tube in place  Neck: supple, no lymphadenopathy  CV: RRR, soft heart sounds, no murmurs appreciated, no peripheral edema  Pulm: Decreased breath sounds, no increased work of breathing, no adventitious sounds  Abd: Soft, nontender, nondistended  Skin: Warm, dry and i left buttock wound  Psych: Appropriate mood and affect    Scheduled Meds   albumin human, 12.5 g, Intravenous, Once  aspirin, 81 mg, Oral, Daily  budesonide, 0.5 mg, Nebulization, BID - RT  cefTRIAXone, 2,000 mg, Intravenous, Q24H  ipratropium, 0.5 mg, Nebulization, 4x Daily - RT  lactulose, 20 g, Oral, TID  nystatin, , Topical, Q12H  senna-docusate sodium, 2 tablet, Oral, BID  sodium chloride, 10 mL, Intravenous, Q12H       PRN Meds     senna-docusate sodium **AND** polyethylene glycol **AND** bisacodyl **AND** bisacodyl    Calcium Replacement - Follow Nurse / BPA Driven Protocol    diphenhydrAMINE    Magnesium Standard Dose  Replacement - Follow Nurse / BPA Driven Protocol    nitroglycerin    Phosphorus Replacement - Follow Nurse / BPA Driven Protocol    Potassium Replacement - Follow Nurse / BPA Driven Protocol    [COMPLETED] Insert Peripheral IV **AND** sodium chloride    sodium chloride    sodium chloride   Infusions  sodium chloride, 100 mL/hr, Last Rate: 100 mL/hr (04/09/25 0037)          Diagnostic Data    Results from last 7 days   Lab Units 04/09/25  0409   WBC 10*3/mm3 11.10*   HEMOGLOBIN g/dL 12.2*   HEMATOCRIT % 38.4   PLATELETS 10*3/mm3 167   GLUCOSE mg/dL 123*   CREATININE mg/dL 0.84   BUN mg/dL 12   SODIUM mmol/L 133*   POTASSIUM mmol/L 4.3   AST (SGOT) U/L 41*   ALT (SGPT) U/L 19   ALK PHOS U/L 90   BILIRUBIN mg/dL 2.2*   ANION GAP mmol/L 8.4       XR Abdomen KUB  Result Date: 4/9/2025  Impression: Gastric suction tube terminates in the stomach. Electronically Signed: Sulaiman Zbaala MD  4/9/2025 3:16 AM EDT  Workstation ID: [id number]    CT Abdomen Pelvis With Contrast  Result Date: 4/8/2025  Impression: 1.A large stone in the left renal pelvis measuring up to 11 mm diameter. There is mild left-sided hydronephrosis and there is moderate peripelvic fat stranding at the left kidney. This could be related to intermittent obstruction or urinary tract infection. 2.Numerous bilateral nonobstructing kidney stones. 3.Colonic diverticulosis. 4.Severe thoracolumbar spondylosis. 5.Bibasilar atelectasis with possible superimposed pneumonia. Correlate with symptoms. Electronically Signed: Sulaiman Zabala MD  4/8/2025 11:23 PM EDT  Workstation ID: [id number]    XR Chest 1 View  Result Date: 4/8/2025  Impression: No radiographic evidence of acute cardiopulmonary abnormality. Electronically Signed: Louie Crowley  4/8/2025 7:15 PM EDT  Workstation ID: [id number]      Interval results reviewed.    Assessment/Plan:   Sepsis  11 mm stone in the left renal pelvis  Left hydronephrosis  Pyelonephritis  Possible  pneumonia  Nephrolithiasis  Encephalopathy  Hyperbilirubinemia  Left buttock wound present on admission  CVA/right cerebral encephalomalacia with left-sided hemiparesis  Ch HFrEF  Hypertension  Hyperlipidemia  Colonic diverticulosis  Severe thrombolumbar spondylosis  Nursing home patient    Blood and urine culture pending.  Patient still spiked fever will DC ceftriaxone and start patient on cefepime for pyelonephritis, sepsis and possible pneumonia  We will check procalcitonin  White blood count trending up, lactic acid 2.2.  Ammonia back to normal  We will check procalcitonin  Obtain right upper ultrasound to rule out cirrhosis or any liver pathology.  Continue to hold antihypertensives due to low blood pressure  Continue lactulose 30 g p.o. twice daily to have 2-3 soft bowel movements  DC NG tube.    Treatment plan discussed with RN.         VTE Prophylaxis:  No VTE prophylaxis order currently exists.         Code status is   Code Status and Medical Interventions: CPR (Attempt to Resuscitate); Full Support; full code presumed, patient is disoriented   Ordered at: 04/08/25 0646     Code Status (Patient has no pulse and is not breathing):    CPR (Attempt to Resuscitate)     Medical Interventions (Patient has pulse or is breathing):    Full Support     Comments:    full code presumed, patient is disoriented       Plan for disposition:     Barriers to Discharge: Acuity of medical conditions  Anticipated Date of Discharge: 4/12/2025  Place of Discharge: Nursing home      Time: 40 minutes     Signature: Electronically signed by Danya Sommer MD, 04/09/25, 07:33 EDT.  [facility] Hospitalist Team

## 2025-04-09 NOTE — ANESTHESIA PROCEDURE NOTES
Airway  Reason: elective    Date/Time: 4/9/2025 4:32 PM  Airway not difficult    General Information and Staff    Patient location during procedure: OR    Indications and Patient Condition  Indications for airway management: airway protection    Preoxygenated: yes    Mask difficulty assessment: 2 - vent by mask + OA or adjuvant +/- NMBA    Final Airway Details    Final airway type: supraglottic airway      Successful airway: I-gel  Size: 5   Number of attempts at approach: 2  Assessment: lips, teeth, and gum same as pre-op and atraumatic intubation    Additional Comments  First attempt LMA 4

## 2025-04-09 NOTE — SIGNIFICANT NOTE
04/09/25 1434   OTHER   Discipline occupational therapist   Rehab Time/Intention   Session Not Performed other (see comments)  (pt is (D) for ADL, bedbound at baseline. No acute OT needs identified. Pt is undergoing cysto for stent placement and will d/c back to his [facility].)

## 2025-04-09 NOTE — H&P
[facility] Medicine Services  History & Physical    Patient Name: Kevin Hurtado  : 1960  MRN: [id number]  Primary Care Physician:  Keesha Lemus MD  Date of admission: 2025  Date and Time of Service: 2025 at 2201    Subjective      Chief Complaint: Altered mental status    History of Present Illness: Kevin Hurtado is a 64 y.o. male with a CMH of CVA with left-sided hemiparesis, CHF, hyperlipidemia, hypertension, who presented from nursing facility ([facility] ) to [facility] on 2025 with altered mental status. Patient is currently awake, conversational however he is disoriented.  Unable to obtain any further history from the patient.  Per ED Note patient received Narcan at the facility with no change mental status.      Per the nursing facility, patient was disoriented, patient normally is at baseline awake alert [place] x 3.  Patient was recently started on fentanyl patch per facility, he received Narcan with no improvement, therefore he was sent to the hospital further evaluation.  Patient is currently DNR/DNI, I confirmed with his brother on the phone.    In ED, patient vitals stable, Tmax 103.6, CMP mostly unremarkable, CBC also unremarkable, lactic acid 2.1, ammonia 101, UA showing bacteria, urine cultures pending. Chest x-ray unremarkable for acute findings Patient given dose of Zosyn, lactulose.  Patient to medicine team for further inpatient management.    Review of Systems negative as mentioned above    Personal History     Past Medical History:   Diagnosis Date    Elevated cholesterol     GERD (gastroesophageal reflux disease)     Stroke        Past Surgical History:   Procedure Laterality Date    ENDOSCOPY N/A 2024    Procedure: ESOPHAGOGASTRODUODENOSCOPY with biopsy x 3 areas;  Surgeon: KG Arizmendi MD;  Location: [facility] ENDOSCOPY;  Service: Gastroenterology;  Laterality: N/A;  post op:       Family History: family history is not on file.  Otherwise pertinent FHx was reviewed and not pertinent to current issue.    Social History:  reports that he has never smoked. He has quit using smokeless tobacco.  His smokeless tobacco use included snuff. He reports that he does not currently use alcohol. He reports that he does not use drugs.    Home Medications:  Prior to Admission Medications       Prescriptions Last Dose Informant Patient Reported? Taking?    aluminum-magnesium hydroxide-simethicone (MAALOX/MYLANTA) 200-200-20 MG/5ML suspension   Yes No    Take 30 mL by mouth Every 8 (Eight) Hours As Needed for Indigestion or Heartburn.    amoxicillin-clavulanate (AUGMENTIN) 875-125 MG per tablet   No No    Take 1 tablet by mouth 2 (Two) Times a Day.    aspirin 81 MG chewable tablet   Yes No    Chew 1 tablet Every Morning.    atorvastatin (LIPITOR) 40 MG tablet   Yes No    Take 1 tablet by mouth Every Night.    baclofen (LIORESAL) 10 MG tablet   Yes No    Take 1 tablet by mouth 3 (Three) Times a Day.    calcium carbonate (TUMS) 500 MG chewable tablet   Yes No    Chew 1 tablet 3 (Three) Times a Day.    carboxymethylcellulose (REFRESH PLUS) 0.5 % solution   Yes No    Administer 2 drops to both eyes 2 (Two) Times a Day.    ezetimibe (ZETIA) 10 MG tablet   Yes No    Take 1 tablet by mouth Every Morning.    famotidine (PEPCID) 20 MG tablet   Yes No    Take 1 tablet by mouth 2 (Two) Times a Day.    gabapentin (NEURONTIN) 400 MG capsule   Yes No    Take 1 capsule by mouth 3 (Three) Times a Day.    HYDROcodone-acetaminophen (NORCO)  MG per tablet   No No    Take 1 tablet by mouth 3 (Three) Times a Day.    ketoconazole (NIZORAL) 2 % shampoo   Yes No    Apply 1 Application topically to the appropriate area as directed 1 (One) Time Per Week. Apply to scalp topically on dayshift every Wednesday, Saturday for tinea versicolor for 6 months    Lidocaine 4 % aerosol   Yes No    Place 1 Application on the skin as directed by provider 2 (Two) Times a Day. Apply to R 4th  toe distal topically every morning and at bedtime for pain    meclizine (ANTIVERT) 25 MG tablet   Yes No    Take 1 tablet by mouth Every 8 (Eight) Hours As Needed for Dizziness.    melatonin 1 MG tablet   Yes No    Take 3 tablets by mouth Every Night.    naloxone (NARCAN) 4 MG/0.1ML nasal spray   No No    Call 911. Don't prime. [place] in 1 nostril for overdose. Repeat in 2-3 minutes in other nostril if no or minimal breathing/responsiveness.    ondansetron (ZOFRAN) 4 MG tablet   Yes No    Take 1 tablet by mouth Every 6 (Six) Hours As Needed for Nausea or Vomiting.    pantoprazole (Protonix) 40 MG EC tablet   No No    Take 1 tablet by mouth 2 (Two) Times a Day.    Scopolamine 1 MG/3DAYS patch   Yes No    Place 1 patch on the skin as directed by provider Every 72 (Seventy-Two) Hours.    Skin Protectants, Misc. (Eucerin) cream   Yes No    Apply 1 Application topically to the appropriate area as directed 2 (Two) Times a Day. Apply to bilateral arms topically every shift for eczema    tamsulosin (FLOMAX) 0.4 MG capsule 24 hr capsule   Yes No    Take 1 capsule by mouth Every Morning.    white petrolatum ointment   Yes No    Apply 1 Application topically to the appropriate area as directed every night at bedtime. Instill 1 application in both eyes at bedtime for eye maintenance 1/2 inch ribbon outer lid              Allergies:  No Known Allergies    Objective      Vitals:   Temp:  [100.4 °F (38 °C)-103.6 °F (39.8 °C)] 103.6 °F (39.8 °C)  Heart Rate:  [108-143] 108  Resp:  [22] 22  BP: (104-114)/(61-64) 110/62  There is no height or weight on file to calculate BMI.  Physical Exam  General: Awake, conversational, disoriented, chronic ill-appearing  HEENT: Atraumatic normocephalic  Cardio: Heart rate normal, rhythm regular  Respiratory: Clear to auscultation, no wheezes on my exam  Abdomen: Soft, nontender, no rebound or guarding  Neuro: Disoriented, left-sided hemiparesis noted    Diagnostic Data:  Lab Results (last 24 hours)        Procedure Component Value Units Date/Time    Urinalysis, Microscopic Only - Straight Cath [[id number]]  (Abnormal) Collected: 04/08/25 1933    Specimen: Urine from Straight Cath Updated: 04/08/25 2031     RBC, UA Too Numerous to Count /HPF      WBC, UA 6-10 /HPF      Bacteria, UA Trace /HPF      Squamous Epithelial Cells, UA 0-2 /HPF      Transitional Epithelial Cells, UA 0-2 /HPF      Hyaline Casts, UA 0-2 /LPF      Amorphous Crystals, UA Small/1+ /HPF      Mucus, UA Small/1+ /HPF      Methodology Manual Light Microscopy    Urine Culture - Urine, Straight Cath [[id number]] Collected: 04/08/25 1933    Specimen: Urine from Straight Cath Updated: 04/08/25 2031    Respiratory Panel PCR w/COVID-19(SARS-CoV-2) KEON/THIAGO/MJ/PAD/COR/BIGG In-House, NP Swab in UTM/VTM, 2 HR TAT - Swab, Nasopharynx [[id number]]  (Normal) Collected: 04/08/25 1933    Specimen: Swab from Nasopharynx Updated: 04/08/25 2027     ADENOVIRUS, PCR Not Detected     Coronavirus 229E Not Detected     Coronavirus HKU1 Not Detected     Coronavirus NL63 Not Detected     Coronavirus OC43 Not Detected     COVID19 Not Detected     Human Metapneumovirus Not Detected     Human Rhinovirus/Enterovirus Not Detected     Influenza A PCR Not Detected     Influenza B PCR Not Detected     Parainfluenza Virus 1 Not Detected     Parainfluenza Virus 2 Not Detected     Parainfluenza Virus 3 Not Detected     Parainfluenza Virus 4 Not Detected     RSV, PCR Not Detected     Bordetella pertussis pcr Not Detected     Bordetella parapertussis PCR Not Detected     Chlamydophila pneumoniae PCR Not Detected     Mycoplasma pneumo by PCR Not Detected    Narrative:      In the setting of a positive respiratory panel with a viral infection PLUS a negative procalcitonin without other underlying concern for bacterial infection, consider observing off antibiotics or discontinuation of antibiotics and continue supportive care. If the respiratory panel is positive for atypical bacterial  infection (Bordetella pertussis, Chlamydophila pneumoniae, or Mycoplasma pneumoniae), consider antibiotic de-escalation to target atypical bacterial infection.    POC Lactate [[id number]]  (Abnormal) Collected: 04/08/25 2019    Specimen: Blood Updated: 04/08/25 2025     Lactate 2.1 mmol/L      Comment: Serial Number: [id number]:  [id number]       TSH Rfx On Abnormal To Free T4 [[id number]]  (Normal) Collected: 04/08/25 1915    Specimen: Blood Updated: 04/08/25 2012     TSH 2.220 uIU/mL     Comprehensive Metabolic Panel [[id number]]  (Abnormal) Collected: 04/08/25 1915    Specimen: Blood Updated: 04/08/25 2008     Glucose 117 mg/dL      BUN 10 mg/dL      Creatinine 1.01 mg/dL      Sodium 137 mmol/L      Potassium 3.9 mmol/L      Chloride 98 mmol/L      CO2 26.4 mmol/L      Calcium 9.4 mg/dL      Total Protein 7.4 g/dL      Albumin 3.7 g/dL      ALT (SGPT) 10 U/L      AST (SGOT) 33 U/L      Alkaline Phosphatase 121 U/L      Total Bilirubin 1.5 mg/dL      Globulin 3.7 gm/dL      A/G Ratio 1.0 g/dL      BUN/Creatinine Ratio 9.9     Anion Gap 12.6 mmol/L      eGFR 83.6 mL/min/1.73     Narrative:      GFR Categories in Chronic Kidney Disease (CKD)      GFR Category          GFR (mL/min/1.73)    Interpretation  G1                     90 or greater         Normal or high (1)  G2                      60-89                Mild decrease (1)  G3a                   45-59                Mild to moderate decrease  G3b                   30-44                Moderate to severe decrease  G4                    15-29                Severe decrease  G5                    14 or less           Kidney failure          (1)In the absence of evidence of kidney disease, neither GFR category G1 or G2 fulfill the criteria for CKD.    eGFR calculation 2021 CKD-EPI creatinine equation, which does not include race as a factor    Magnesium [[id number]]  (Normal) Collected: 04/08/25 1915    Specimen: Blood Updated: 04/08/25 2008     Magnesium 1.8  mg/dL     Ammonia [[id number]]  (Abnormal) Collected: 04/08/25 1933    Specimen: Blood from Arm, Left Updated: 04/08/25 1958     Ammonia 101 umol/L     Extra Tubes [[id number]] Collected: 04/08/25 1915    Specimen: Blood, Venous Line Updated: 04/08/25 1946    Narrative:      The following orders were created for panel order Extra Tubes.  Procedure                               Abnormality         Status                     ---------                               -----------         ------                     Gold Top - SST[[id number]]                                   Final result               Light Blue Top[[id number]]                                   Final result                 Please view results for these tests on the individual orders.    Gold Top - SST [[id number]] Collected: 04/08/25 1915    Specimen: Blood Updated: 04/08/25 1946     Extra Tube Hold for add-ons.     Comment: Auto resulted.       Light Blue Top [[id number]] Collected: 04/08/25 1915    Specimen: Blood Updated: 04/08/25 1946     Extra Tube Hold for add-ons.     Comment: Auto resulted       Urinalysis With Culture If Indicated - Straight Cath [[id number]]  (Abnormal) Collected: 04/08/25 1933    Specimen: Urine from Straight Cath Updated: 04/08/25 1945     Color, UA Dark Yellow     Appearance, UA Hazy     pH, UA <=5.0     Specific Gravity, UA 1.026     Glucose, UA Negative     Ketones, UA Trace     Bilirubin, UA Negative     Blood, UA Large (3+)     Protein, UA 30 mg/dL (1+)     Leuk Esterase, UA Small (1+)     Nitrite, UA Negative     Urobilinogen, UA 0.2 E.U./dL    Narrative:      In absence of clinical symptoms, the presence of pyuria, bacteria, and/or nitrites on the urinalysis result does not correlate with infection.    CBC & Differential [[id number]]  (Abnormal) Collected: 04/08/25 1915    Specimen: Blood Updated: 04/08/25 1942    Narrative:      The following orders were created for panel order CBC & Differential.  Procedure                                Abnormality         Status                     ---------                               -----------         ------                     CBC Auto Differential[[id number]]        Abnormal            Final result                 Please view results for these tests on the individual orders.    CBC Auto Differential [[id number]]  (Abnormal) Collected: 04/08/25 1915    Specimen: Blood Updated: 04/08/25 1942     WBC 4.88 10*3/mm3      RBC 4.79 10*6/mm3      Hemoglobin 13.9 g/dL      Hematocrit 45.0 %      MCV 93.9 fL      MCH 29.0 pg      MCHC 30.9 g/dL      RDW 14.1 %      RDW-SD 49.2 fl      MPV 9.4 fL      Platelets 189 10*3/mm3      Neutrophil % 85.6 %      Lymphocyte % 10.5 %      Monocyte % 2.9 %      Eosinophil % 0.4 %      Basophil % 0.4 %      Immature Grans % 0.2 %      Neutrophils, Absolute 4.18 10*3/mm3      Lymphocytes, Absolute 0.51 10*3/mm3      Monocytes, Absolute 0.14 10*3/mm3      Eosinophils, Absolute 0.02 10*3/mm3      Basophils, Absolute 0.02 10*3/mm3      Immature Grans, Absolute 0.01 10*3/mm3      nRBC 0.0 /100 WBC     Blood Culture - Blood, Arm, Left [[id number]] Collected: 04/08/25 1933    Specimen: Blood from Arm, Left Updated: 04/08/25 1938    Blood Culture - Blood, Arm, Right [[id number]] Collected: 04/08/25 1915    Specimen: Blood from Arm, Right Updated: 04/08/25 1938    POC Glucose Once [[id number]]  (Normal) Collected: 04/08/25 1843    Specimen: Blood Updated: 04/08/25 1845     Glucose 101 mg/dL      Comment: Serial Number: [id number]:  [id number]                Imaging Results (Last 24 Hours)       Procedure Component Value Units Date/Time    XR Chest 1 View [[id number]] Collected: 04/08/25 1914     Updated: 04/08/25 1917    Narrative:      XR CHEST 1 VW    Date of Exam: 4/8/2025 6:51 PM EDT    Indication: Altered mental status    Comparison: Chest radiograph and chest CT 8/26/2024    Findings:      Mediastinum: Cardiac silhouette appears unchanged and not enlarged. Loop recorder  projects over the left chest.    Lungs: The lungs appear clear without focal consolidation appreciated.    Pleura: No pleural effusion or pneumothorax.    Bones and soft tissues: No acute, displaced fracture seen.        Impression:      Impression:  No radiographic evidence of acute cardiopulmonary abnormality.        Electronically Signed: Louie Crowley    4/8/2025 7:15 PM EDT    Workstation ID: [id number]              Assessment & Plan    Kevin Hurtado is a 64 y.o. male with a CMH of CVA with left-sided hemiparesis, CHF, hyperlipidemia, hypertension, who presented from nursing facility ([facility] ) to [facility] on 4/8/2025 with altered mental status.     Assessments  Acute encephalopathy: Multifactorial metabolic and infectious  History of CVA with left-sided hemiparesis  Heart failure with midrange ejection fraction, per echo LVEF of 46% per echo in 2022  Hyperammonemia, ammonia 101 on admission  Possible UTI/ PNA on abx  Large stone in the left renal pelvis, measuring 11 mm  Mild left-sided hydronephrosis  Moderate peripelvic fat stranding in the left kidney  Hypertension  Hyperlipidemia    Plan    -Check CT head without contrast, check TSH, Start lactulose 3 times daily for hyperammonemia, will place NGT for lactulose, monitor for bowel movements, repeat ammonia in the morning    -Patient was started on Zosyn in ED, developed a mild rash on the upper thorax, will DC Zosyn, CT abdomen showing mild hydronephrosis and UA showing possible Curia, will start on ceftriaxone, follow-up on blood culture and urine cultures    -Will start the patient on normal saline 75 cc an hour, chest x-ray clear although CT showing possible pneumonia, monitor for respiratory status closely while on fluids, be cautious with fluids given CHF history, patient is currently has NGT in place now    -Urology consult for hydronephrosis, follow further recommendation in the morning    -Hold any antihypertension medications  for now given soft BP    -Wound consult for evaluation of left buttock wound, no fluctuance on exam    -AM labs    VTE Prophylaxis:  No VTE prophylaxis order currently exists.      CODE STATUS:  Presumed Full      I discussed the patient's findings and my recommendations with patient.      This document has been electronically signed by Harish Fu MD on April 8, 2025 22:02 EDT   [facility] Hospitalist Team

## 2025-04-09 NOTE — CASE MANAGEMENT/SOCIAL WORK
Social Drivers of Health     Tobacco Use: Medium Risk (8/27/2024)    Patient History     Smoking Tobacco Use: Never     Smokeless Tobacco Use: Former     Passive Exposure: Not on file   Alcohol Use: Not At Risk (4/9/2025)    AUDIT-C     Frequency of Alcohol Consumption: Never     Average Number of Drinks: Patient does not drink     Frequency of Binge Drinking: Never   Financial Resource Strain: Low Risk  (4/9/2025)    Overall Financial Resource Strain (CARDIA)     Difficulty of Paying Living Expenses: Not very hard   Food Insecurity: No Food Insecurity (4/9/2025)    Hunger Vital Sign     Worried About Running Out of Food in the Last Year: Never true     Ran Out of Food in the Last Year: Never true   Transportation Needs: No Transportation Needs (4/9/2025)    PRAPARE - Transportation     Lack of Transportation (Medical): No     Lack of Transportation (Non-Medical): No   Physical Activity: Inactive (4/9/2025)    Exercise Vital Sign     Days of Exercise per Week: 0 days     Minutes of Exercise per Session: 0 min   Stress: No Stress Concern Present (4/9/2025)    [employer] [place] [employer] - Occupational Stress Questionnaire     Feeling of Stress : Not at all   Social Connections: Not At Risk (4/9/2025)    Family and Community Support     Help with Day-to-Day Activities: I don't need any help     Lonely or Isolated: Rarely   Interpersonal Safety: Not At Risk (4/9/2025)    Abuse Screen     Unsafe at Home or Work/School: no     Feels Threatened by Someone?: no     Does Anyone Keep You from Contacting Others or Doint Things Outside the Home?: no     Physical Sign of Abuse Present: no   Depression: Not at risk (4/9/2025)    PHQ-2     PHQ-2 Score: 0   Housing Stability: Not At Risk (4/9/2025)    Housing Stability     Current Living Arrangements: home     Potentially Unsafe Housing Conditions: none   Utilities: Not At Risk (4/9/2025)    C Utilities     Threatened with loss of utilities: No   Health Literacy:  Not At Risk (4/9/2025)    Education     Help with school or training?: No     Preferred Language: English   Employment: Not At Risk (4/9/2025)    Employment     Do you want help finding or keeping work or a job?: I do not need or want help   Disabilities: At Risk (4/9/2025)    Disabilities     Concentrating, Remembering, or Making Decisions Difficulty: yes     Doing Errands Independently Difficulty: yes

## 2025-04-09 NOTE — NURSING NOTE
Call placed to [facility], no paper work arrived from the ED to complete the admission. No family at bedside. Patient confused and poor historian. Waiting on paper work.

## 2025-04-09 NOTE — DISCHARGE PLACEMENT REQUEST
"Kevin Hurtado (64 y.o. Male)       Date of Birth   1960    Social Security Number       Address   [address] [place] [address] [place] [facility]    Home Phone   [phone]    MRN   [id number]       Synagogue   Ep[employer]    Marital Status   Single                            Admission Date   4/8/2025    Admission Type   Emergency    Admitting Provider   Harish Fu MD    Attending Provider   Danya Sommer MD    Department, Room/Bed   [facility] SURGICAL INPATIENT, 4127/1       Discharge Date       Discharge Disposition       Discharge Destination                                 Attending Provider: Danya Sommer MD    Allergies: No Known Allergies    Isolation: None   Infection: None   Code Status: CPR    Ht: 175.3 cm (69\")   Wt: 89.9 kg (198 lb 3.1 oz)    Admission Cmt: None   Principal Problem: Altered mental status [R41.82]                   Active Insurance as of 4/8/2025       Primary Coverage       Payor Plan Insurance Group Employer/Plan Group    [place] [facility] [place] [facility] [id number]       Payor Plan Address Payor Plan Phone Number Payor Plan Fax Number Effective Dates    [address] [address]   7/1/2024 - None Entered    [place] [address]         Subscriber Name Subscriber Birth Date Member ID       KEVIN HURTADO 1960 [id number]                     Emergency Contacts        (Rel.) Home Phone Work Phone Mobile Phone    Yossi Hurtado (Brother) (Legal Guardian) -- -- [phone]    ROCKBLADE HANKS (Mother) [phone] -- [phone]            "

## 2025-04-09 NOTE — ANESTHESIA PREPROCEDURE EVALUATION
Anesthesia Evaluation     Patient summary reviewed and Nursing notes reviewed   NPO Solid Status: > 8 hours  NPO Liquid Status: > 8 hours           Airway   Mallampati: II  TM distance: >3 FB  Neck ROM: full  No difficulty expected  Dental    (+) poor dentition    Pulmonary    Cardiovascular     (+) hyperlipidemia      Neuro/Psych  (+) CVA residual symptoms  GI/Hepatic/Renal/Endo    (+) GERD    Musculoskeletal     Abdominal    Substance History      OB/GYN          Other        ROS/Med Hx Other: 2022 echo  Calculated left ventricular ejection fraction of 46 % (Biplane Torres's   method).   Mild global left ventricular hypokinesis.                   Anesthesia Plan    ASA 3     general     intravenous induction     Anesthetic plan, risks, benefits, and alternatives have been provided, discussed and informed consent has been obtained with: patient.    Plan discussed with CRNA.      CODE STATUS:    Code Status (Patient has no pulse and is not breathing): CPR (Attempt to Resuscitate)  Medical Interventions (Patient has pulse or is breathing): Full Support  Comments: full code presumed, patient is disoriented

## 2025-04-09 NOTE — ANESTHESIA POSTPROCEDURE EVALUATION
Patient: Kevin Hurtado    Procedure Summary       Date: 04/09/25 Room / Location: [facility] OR 06 / [facility] MAIN OR    Anesthesia Start: 1622 Anesthesia Stop: 1651    Procedure: CYSTOSCOPY URETERAL CATHETER/STENT INSERTION (Left) Diagnosis:     Surgeons: Sulaiman Loza MD Provider: Saman Reagan MD    Anesthesia Type: general ASA Status: 3            Anesthesia Type: general    Vitals  Vitals Value Taken Time   /60 04/09/25 17:11   Temp 98.2 °F (36.8 °C) 04/09/25 16:53   Pulse 92 04/09/25 17:15   Resp 17 04/09/25 17:08   SpO2 97 % 04/09/25 17:15   Vitals shown include unfiled device data.        Post Anesthesia Care and Evaluation    Patient location during evaluation: PACU  Patient participation: complete - patient participated  Level of consciousness: awake  Pain scale: See nurse's notes for pain score.  Pain management: adequate    Airway patency: patent  Anesthetic complications: No anesthetic complications  PONV Status: none  Cardiovascular status: acceptable  Respiratory status: acceptable and spontaneous ventilation  Hydration status: acceptable    Comments: Patient seen and examined postoperatively; vital signs stable; SpO2 greater than or equal to 90%; cardiopulmonary status stable; nausea/vomiting adequately controlled; pain adequately controlled; no apparent anesthesia complications; patient discharged from anesthesia care when discharge criteria were met

## 2025-04-10 VITALS
OXYGEN SATURATION: 95 % | WEIGHT: 198 LBS | HEIGHT: 69 IN | SYSTOLIC BLOOD PRESSURE: 95 MMHG | BODY MASS INDEX: 29.33 KG/M2 | RESPIRATION RATE: 9 BRPM | DIASTOLIC BLOOD PRESSURE: 55 MMHG | TEMPERATURE: 98.4 F | HEART RATE: 72 BPM

## 2025-04-10 PROBLEM — K76.82 ENCEPHALOPATHY, HEPATIC: Status: ACTIVE | Noted: 2025-04-10

## 2025-04-10 PROBLEM — J18.9 LEFT LOWER LOBE PNEUMONIA: Status: ACTIVE | Noted: 2025-04-10

## 2025-04-10 PROBLEM — N20.0 LEFT NEPHROLITHIASIS: Status: ACTIVE | Noted: 2025-04-10

## 2025-04-10 LAB
ALBUMIN SERPL-MCNC: 3.1 G/DL (ref 3.5–5.2)
ALBUMIN/GLOB SERPL: 1 G/DL
ALP SERPL-CCNC: 75 U/L (ref 39–117)
ALT SERPL W P-5'-P-CCNC: 14 U/L (ref 1–41)
AMMONIA BLD-SCNC: 29 UMOL/L (ref 16–60)
ANION GAP SERPL CALCULATED.3IONS-SCNC: 9.2 MMOL/L (ref 5–15)
AST SERPL-CCNC: 30 U/L (ref 1–40)
BASOPHILS # BLD AUTO: 0.01 10*3/MM3 (ref 0–0.2)
BASOPHILS NFR BLD AUTO: 0.1 % (ref 0–1.5)
BILIRUB SERPL-MCNC: 1.4 MG/DL (ref 0–1.2)
BUN SERPL-MCNC: 15 MG/DL (ref 8–23)
BUN/CREAT SERPL: 21.4 (ref 7–25)
CALCIUM SPEC-SCNC: 8.5 MG/DL (ref 8.6–10.5)
CHLORIDE SERPL-SCNC: 103 MMOL/L (ref 98–107)
CO2 SERPL-SCNC: 23.8 MMOL/L (ref 22–29)
CREAT SERPL-MCNC: 0.7 MG/DL (ref 0.76–1.27)
DEPRECATED RDW RBC AUTO: 49.6 FL (ref 37–54)
EGFRCR SERPLBLD CKD-EPI 2021: 103.5 ML/MIN/1.73
EOSINOPHIL # BLD AUTO: 0 10*3/MM3 (ref 0–0.4)
EOSINOPHIL NFR BLD AUTO: 0 % (ref 0.3–6.2)
ERYTHROCYTE [DISTWIDTH] IN BLOOD BY AUTOMATED COUNT: 14.5 % (ref 12.3–15.4)
GLOBULIN UR ELPH-MCNC: 3 GM/DL
GLUCOSE SERPL-MCNC: 131 MG/DL (ref 65–99)
HCT VFR BLD AUTO: 34 % (ref 37.5–51)
HGB BLD-MCNC: 10.7 G/DL (ref 13–17.7)
IMM GRANULOCYTES # BLD AUTO: 0.04 10*3/MM3 (ref 0–0.05)
IMM GRANULOCYTES NFR BLD AUTO: 0.5 % (ref 0–0.5)
LYMPHOCYTES # BLD AUTO: 0.58 10*3/MM3 (ref 0.7–3.1)
LYMPHOCYTES NFR BLD AUTO: 7.3 % (ref 19.6–45.3)
MCH RBC QN AUTO: 29.2 PG (ref 26.6–33)
MCHC RBC AUTO-ENTMCNC: 31.5 G/DL (ref 31.5–35.7)
MCV RBC AUTO: 92.9 FL (ref 79–97)
MONOCYTES # BLD AUTO: 0.15 10*3/MM3 (ref 0.1–0.9)
MONOCYTES NFR BLD AUTO: 1.9 % (ref 5–12)
NEUTROPHILS NFR BLD AUTO: 7.15 10*3/MM3 (ref 1.7–7)
NEUTROPHILS NFR BLD AUTO: 90.2 % (ref 42.7–76)
NRBC BLD AUTO-RTO: 0 /100 WBC (ref 0–0.2)
PLATELET # BLD AUTO: 157 10*3/MM3 (ref 140–450)
PMV BLD AUTO: 9.4 FL (ref 6–12)
POTASSIUM SERPL-SCNC: 4.3 MMOL/L (ref 3.5–5.2)
PROT SERPL-MCNC: 6.1 G/DL (ref 6–8.5)
RBC # BLD AUTO: 3.66 10*6/MM3 (ref 4.14–5.8)
SODIUM SERPL-SCNC: 136 MMOL/L (ref 136–145)
WBC NRBC COR # BLD AUTO: 7.93 10*3/MM3 (ref 3.4–10.8)

## 2025-04-10 PROCEDURE — 94761 N-INVAS EAR/PLS OXIMETRY MLT: CPT

## 2025-04-10 PROCEDURE — 94799 UNLISTED PULMONARY SVC/PX: CPT

## 2025-04-10 PROCEDURE — 25010000002 CEFEPIME PER 500 MG: Performed by: UROLOGY

## 2025-04-10 PROCEDURE — 82140 ASSAY OF AMMONIA: CPT | Performed by: UROLOGY

## 2025-04-10 PROCEDURE — 85025 COMPLETE CBC W/AUTO DIFF WBC: CPT | Performed by: UROLOGY

## 2025-04-10 PROCEDURE — 25010000002 FAMOTIDINE 10 MG/ML SOLUTION: Performed by: NURSE PRACTITIONER

## 2025-04-10 PROCEDURE — 80053 COMPREHEN METABOLIC PANEL: CPT | Performed by: UROLOGY

## 2025-04-10 PROCEDURE — 94664 DEMO&/EVAL PT USE INHALER: CPT

## 2025-04-10 RX ORDER — CEFDINIR 300 MG/1
300 CAPSULE ORAL 2 TIMES DAILY
Qty: 10 CAPSULE | Refills: 0 | Status: SHIPPED | OUTPATIENT
Start: 2025-04-10

## 2025-04-10 RX ORDER — GABAPENTIN 600 MG/1
600 TABLET ORAL 3 TIMES DAILY
Qty: 9 TABLET | Refills: 0 | Status: SHIPPED | OUTPATIENT
Start: 2025-04-10

## 2025-04-10 RX ADMIN — MICONAZOLE NITRATE 1 APPLICATION: 20 POWDER TOPICAL at 09:31

## 2025-04-10 RX ADMIN — SENNOSIDES AND DOCUSATE SODIUM 2 TABLET: 50; 8.6 TABLET ORAL at 09:34

## 2025-04-10 RX ADMIN — NYSTATIN: 100000 POWDER TOPICAL at 09:31

## 2025-04-10 RX ADMIN — CEFEPIME 2000 MG: 2 INJECTION, POWDER, FOR SOLUTION INTRAVENOUS at 00:56

## 2025-04-10 RX ADMIN — BUDESONIDE 0.5 MG: 0.5 INHALANT RESPIRATORY (INHALATION) at 06:30

## 2025-04-10 RX ADMIN — IPRATROPIUM BROMIDE 0.5 MG: 0.5 SOLUTION RESPIRATORY (INHALATION) at 06:30

## 2025-04-10 RX ADMIN — CEFEPIME 2000 MG: 2 INJECTION, POWDER, FOR SOLUTION INTRAVENOUS at 09:24

## 2025-04-10 RX ADMIN — FAMOTIDINE 20 MG: 10 INJECTION INTRAVENOUS at 09:33

## 2025-04-10 RX ADMIN — Medication 10 ML: at 09:34

## 2025-04-10 RX ADMIN — LACTULOSE 20 G: 20 SOLUTION ORAL at 09:34

## 2025-04-10 RX ADMIN — IPRATROPIUM BROMIDE 0.5 MG: 0.5 SOLUTION RESPIRATORY (INHALATION) at 10:59

## 2025-04-10 RX ADMIN — ASPIRIN 81 MG CHEWABLE TABLET 81 MG: 81 TABLET CHEWABLE at 09:34

## 2025-04-10 NOTE — CASE MANAGEMENT/SOCIAL WORK
"Physicians Statement of Medical Necessity for  Ambulance Transportation    GENERAL INFORMATION     Name: Kevin Hurtado  YOB: 1960  Medicare #: [id number]   Transport Date: 4/10/2025 (Valid for round trips this date, or for scheduled repetitive trips for 60 days from the date signed below.)  Origin: [facility]  Destination: [facility]   Is the Patient's stay covered under Medicare Part A (PPS/DRG?)Yes  Closest appropriate facility? Yes  If this a hosp-hosp transfer? No  Is this a hospice patient? No    MEDICAL NECESSITY QUESTIONAIRE    Ambulance Transportation is medically necessary only if other means of transportation are contraindicated or would be potentially harmful to the patient.  To meet this requirement, the patient must be either \"bed confined\" or suffer from a condition such that transport by means other than an ambulance is contraindicated by the patient's condition.  The following questions must be answered by the healthcare professional signing below for this form to be valid:     1) Describe the MEDICAL CONDITION (physical and/or mental) of this patient AT THE TIME OF AMBULANCE TRANSPORT that requires the patient to be transported in an ambulance, and why transport by other means is contraindicated by the patient's condition: bedbound  Past Medical History:   Diagnosis Date    Elevated cholesterol     GERD (gastroesophageal reflux disease)     Stroke       Past Surgical History:   Procedure Laterality Date    ENDOSCOPY N/A 8/27/2024    Procedure: ESOPHAGOGASTRODUODENOSCOPY with biopsy x 3 areas;  Surgeon: KG Arizmendi MD;  Location: [facility] ENDOSCOPY;  Service: Gastroenterology;  Laterality: N/A;  post op:      2) Is this patient \"bed confined\" as defined below?Yes   To be \"bed confined\" the patient must satisfy all three of the following criteria:  (1) unable to get up from bed without assistance; AND (2) unable to ambulate;  AND (3) unable to sit in a chair or wheelchair.  3) Can " this patient safely be transported by car or wheelchair van (I.e., may safely sit during transport, without an attendant or monitoring?)No   4. In addition to completing questions 1-3 above, please check any of the following conditions that apply*:          *Note: supporting documentation for any boxes checked must be maintained in the patient's medical records Unable to tolerate seated position for time needed to transport      SIGNATURE OF PHYSICIAN OR OTHER AUTHORIZED HEALTHCARE PROFESSIONAL    I certify that the above information is true and correct based on my evaluation of this patient, and represent that the patient requires transport by ambulance and that other forms of transport are contraindicated.  I understand that this information will be used by the Centers for Medicare and Medicaid Services (CMS) to support the determiniation of medical necessity for ambulance services, and I represent that I have personal knowledge of the patient's condition at the time of transport.    x   If this box is checked, I also certify that the patient is physically or mentally incapable of signing the ambulance service's claim form and that the institution with which I am affiliated has furnished care, services or assistance to the patient.  My signature below is made on behalf of the patient pursuant to 42 .36(b)(4). In accordance with 42 .37, the specific reason(s) that the patient is physically or mentally incapable of signing the claim for is as follows: confusion    Signature of Physician or Healthcare Professional     Claudia Osuna RN Date/Time:   4/10/2025 1250     (For Scheduled repetitive transport, this form is not valid for transports performed more than 60 days after this date).                                                                                                                                             --------------------------------------------------------------------------------------------  Printed Name and Credentials of Physician or Authorized Healthcare Professional     *Form must be signed by patient's attending physician for scheduled, repetitive transports,.  For non-repetitive ambulance transports, if unable to obtain the signature of the attending physician, any of the following may sign (please select below):     Physician  Clinical Nurse Specialist  Registered Nurse x    Physician Assistant  Discharge Planner  Licensed Practical Nurse     Nurse Practitioner   x

## 2025-04-10 NOTE — PLAN OF CARE
Goal Outcome Evaluation:                                          Patient is doing well this shift. Patient does complain of some acid reflux, PRN tums ordered and given and pepcid restarted. Patient has been A&O X4 this shift for this RN. Patient being turned when he allows us to put pillow underneath him. Patient on IV Cefepime. Patient had cysto 4/9 with stent placed. Patient takes medications whole, bedrest. Patient will return to [facility] on discharge.

## 2025-04-10 NOTE — PROGRESS NOTES
"  [facility] DAILY PROGRESS NOTE    Patient Identification  Name: Kevin Hurtado  Age: 64 y.o.  Sex: male  :  1960  MRN: [id number]    Date: 4/10/2025             Subjective:  Interval History: s/p L stent     Objective:    Scheduled Meds:aspirin, 81 mg, Oral, Daily  budesonide, 0.5 mg, Nebulization, BID - RT  cefepime, 2,000 mg, Intravenous, Q8H  famotidine, 20 mg, Intravenous, Q12H  ipratropium, 0.5 mg, Nebulization, 4x Daily - RT  lactulose, 20 g, Oral, TID  miconazole, 1 Application, Topical, Q12H  nystatin, , Topical, Q12H  senna-docusate sodium, 2 tablet, Oral, BID  sodium chloride, 10 mL, Intravenous, Q12H      Continuous Infusions:   PRN Meds:  acetaminophen    senna-docusate sodium **AND** polyethylene glycol **AND** bisacodyl **AND** bisacodyl    calcium carbonate    Calcium Replacement - Follow Nurse / BPA Driven Protocol    diphenhydrAMINE    Magnesium Standard Dose Replacement - Follow Nurse / BPA Driven Protocol    nitroglycerin    Phosphorus Replacement - Follow Nurse / BPA Driven Protocol    Potassium Replacement - Follow Nurse / BPA Driven Protocol    [COMPLETED] Insert Peripheral IV **AND** sodium chloride    sodium chloride    sodium chloride    Vital signs in last 24 hours:  Temp:  [97.7 °F (36.5 °C)-98.3 °F (36.8 °C)] 97.7 °F (36.5 °C)  Heart Rate:  [61-93] 62  Resp:  [13-24] 16  BP: ()/(49-70) 114/63    Intake/Output:    Intake/Output Summary (Last 24 hours) at 4/10/2025 0811  Last data filed at 2025 1651  Gross per 24 hour   Intake 1656 ml   Output 600 ml   Net 1056 ml       Exam:  /63 (BP Location: Right arm, Patient Position: Lying)   Pulse 62   Temp 97.7 °F (36.5 °C) (Oral)   Resp 16   Ht 175.3 cm (69\")   Wt 89.8 kg (198 lb)   SpO2 96%   BMI 29.24 kg/m²     General Appearance:    Alert, cooperative, NAD   Lungs:     Respirations unlabored, no audible wheezing    Heart:    No cyanosis   Abdomen:     Soft, ND    :    No suprapubic distention        "     Data Review:  All labs (24hrs):   Recent Results (from the past 24 hours)   STAT Lactic Acid, Reflex    Collection Time: 04/09/25  8:52 AM    Specimen: Arm, Right; Blood   Result Value Ref Range    Lactate 1.6 0.5 - 2.0 mmol/L   Procalcitonin    Collection Time: 04/09/25 10:16 AM    Specimen: Arm, Right; Blood   Result Value Ref Range    Procalcitonin 23.80 (H) 0.00 - 0.25 ng/mL   Lactic Acid, Plasma    Collection Time: 04/09/25 10:16 AM    Specimen: Arm, Right; Blood   Result Value Ref Range    Lactate 1.8 0.5 - 2.0 mmol/L   Comprehensive Metabolic Panel    Collection Time: 04/10/25 12:46 AM    Specimen: Arm, Right; Blood   Result Value Ref Range    Glucose 131 (H) 65 - 99 mg/dL    BUN 15 8 - 23 mg/dL    Creatinine 0.70 (L) 0.76 - 1.27 mg/dL    Sodium 136 136 - 145 mmol/L    Potassium 4.3 3.5 - 5.2 mmol/L    Chloride 103 98 - 107 mmol/L    CO2 23.8 22.0 - 29.0 mmol/L    Calcium 8.5 (L) 8.6 - 10.5 mg/dL    Total Protein 6.1 6.0 - 8.5 g/dL    Albumin 3.1 (L) 3.5 - 5.2 g/dL    ALT (SGPT) 14 1 - 41 U/L    AST (SGOT) 30 1 - 40 U/L    Alkaline Phosphatase 75 39 - 117 U/L    Total Bilirubin 1.4 (H) 0.0 - 1.2 mg/dL    Globulin 3.0 gm/dL    A/G Ratio 1.0 g/dL    BUN/Creatinine Ratio 21.4 7.0 - 25.0    Anion Gap 9.2 5.0 - 15.0 mmol/L    eGFR 103.5 >60.0 mL/min/1.73   Ammonia    Collection Time: 04/10/25 12:46 AM    Specimen: Arm, Right; Blood   Result Value Ref Range    Ammonia 29 16 - 60 umol/L   CBC Auto Differential    Collection Time: 04/10/25 12:46 AM    Specimen: Arm, Right; Blood   Result Value Ref Range    WBC 7.93 3.40 - 10.80 10*3/mm3    RBC 3.66 (L) 4.14 - 5.80 10*6/mm3    Hemoglobin 10.7 (L) 13.0 - 17.7 g/dL    Hematocrit 34.0 (L) 37.5 - 51.0 %    MCV 92.9 79.0 - 97.0 fL    MCH 29.2 26.6 - 33.0 pg    MCHC 31.5 31.5 - 35.7 g/dL    RDW 14.5 12.3 - 15.4 %    RDW-SD 49.6 37.0 - 54.0 fl    MPV 9.4 6.0 - 12.0 fL    Platelets 157 140 - 450 10*3/mm3    Neutrophil % 90.2 (H) 42.7 - 76.0 %    Lymphocyte % 7.3 (L) 19.6  - 45.3 %    Monocyte % 1.9 (L) 5.0 - 12.0 %    Eosinophil % 0.0 (L) 0.3 - 6.2 %    Basophil % 0.1 0.0 - 1.5 %    Immature Grans % 0.5 0.0 - 0.5 %    Neutrophils, Absolute 7.15 (H) 1.70 - 7.00 10*3/mm3    Lymphocytes, Absolute 0.58 (L) 0.70 - 3.10 10*3/mm3    Monocytes, Absolute 0.15 0.10 - 0.90 10*3/mm3    Eosinophils, Absolute 0.00 0.00 - 0.40 10*3/mm3    Basophils, Absolute 0.01 0.00 - 0.20 10*3/mm3    Immature Grans, Absolute 0.04 0.00 - 0.05 10*3/mm3    nRBC 0.0 0.0 - 0.2 /100 WBC      Imaging Results (Last 24 Hours)       Procedure Component Value Units Date/Time    FL < 1 Hour [[id number]] Resulted: 04/09/25 1651     Updated: 04/09/25 1651    Narrative:      This procedure was auto-finalized with no dictation required.    US Liver [[id number]] Collected: 04/09/25 1239     Updated: 04/09/25 1246    Narrative:      US LIVER    Date of Exam: 4/9/2025 11:19 AM EDT    Indication: Elevated bilirubin, ammonia, encephalopathy.    Comparison: CT abdomen and pelvis 4/8/2025    Technique: Grayscale and color Doppler ultrasound evaluation of the right upper quadrant was performed.      Findings:  Liver measures 16.2 cm in length. The echogenicity is normal. There are no focal liver lesions. The common bile duct measures 5.3 mm and appears sonographically normal. Normal blood flow is documented in the portal and hepatic veins.      Impression:      Impression:  Normal liver ultrasound.        Electronically Signed: Pardeep Almanzar MD    4/9/2025 12:44 PM EDT    Workstation ID: [id number]    XR Chest 1 View [[id number]] Collected: 04/09/25 1235     Updated: 04/09/25 1238    Narrative:      XR CHEST 1 VW    Date of Exam: 4/9/2025 11:10 AM EDT    Indication: Patient with poor, leukocytosis, CT with possible pneumonia    Comparison: 4/8/2025    Findings:  A nasoenteric tube passes into the stomach. Heart size is normal. There is airspace disease in the left lung base and likely a small left pleural effusion. The lungs are otherwise  clear. The vascular markings are normal.      Impression:      Impression:  Left lower lobe pneumonia with small left pleural effusion.        Electronically Signed: Pardeep Almanzar MD    4/9/2025 12:36 PM EDT    Workstation ID: [id number]    CT Head Without Contrast [[id number]] Collected: 04/09/25 0807     Updated: 04/09/25 0812    Narrative:      CT HEAD WO CONTRAST    Date of Exam: 4/9/2025 7:51 AM EDT    Indication: AMS, hx of prior stroke.    Comparison: None available.    Technique: Axial CT images were obtained of the head without contrast administration.  Coronal reconstructions were performed.  Automated exposure control and iterative reconstruction methods were used.      Findings:  No acute intracranial hemorrhage or extra-axial collection is identified. There is a large area encephalomalacia along the right cerebral hemisphere with ex-vacuo dilatation of the right lateral ventricle. There is no evidence of acute mass effect or   midline shift. The basal cisterns appear patent. The gray-white differentiation appears preserved.    There are changes from right frontal craniotomy. The paranasal sinuses and mastoid air cells are well aerated.      Impression:      Impression:  1.No acute intracranial process identified.  2.Large area of encephalomalacia along right cerebral hemisphere.        Electronically Signed: Tal Mcallister MD    4/9/2025 8:10 AM EDT    Workstation ID: [id number]             Assessment:    Altered mental status    Pressure injury of sacral region, stage 2    Yeast infection of the skin      11mm L UPJ stone s/p stent  Sepsis    Plan:    Recovering well  Follow cultures  Will plan outpatient stone intervention 1-2 weeks, schedulers will arrange    Sulaiman Loza MD  [facility]  [facility], [address]  [place] [address]  Office: [phone]  04/10/25  08:11 EDT

## 2025-04-10 NOTE — DISCHARGE SUMMARY
[facility] Medicine Services  Discharge Summary    Date of Service: 4/10/2025  Patient Name: Kevin Hurtado  : 1960  MRN: [id number]    Date of Admission: 2025  Discharge Diagnosis: Altered mental status  Date of Discharge: 4/10/2025  Primary Care Physician: Keesha Lemus MD    Presenting Problem:   Altered mental status [R41.82]  Acute febrile illness [R50.9]  Increased ammonia level [R79.89]    Active and Resolved Hospital Problems:  Active Hospital Problems    Diagnosis POA    **Altered mental status [R41.82] Yes    Encephalopathy, hepatic [K76.82] Yes    Left lower lobe pneumonia [J18.9] Yes    Left nephrolithiasis [N20.0] Yes    Pressure injury of sacral region, stage 2 [L89.152] Yes    Yeast infection of the skin [B37.2] Yes      Resolved Hospital Problems   No resolved problems to display.       Hospital Course     Hospital Course:  This is a very pleasant 64-year-old  gentleman, who came to the emergency department, due to complaints of altered mental status.  He has a past medical history of CVA with left-sided hemiparesis, CHF, hyperlipidemia, hypertension, who presented from [facility], to [facility] on, due to complaints of altered mental status.  Patient was awake and conversational, however, was disoriented.  Upon presentation to the emergency department, he was evaluated thoroughly, and was found to have evidence of sepsis, secondary to pneumonia as well as urinary tract infection.  Lactic acid was slightly elevated at 2.1, and the patient was also found to have hepatic encephalopathy, with ammonia level of 101.  The patient was found to have a large kidney stone, for which he was seen and evaluated by urology.  He was taken for surgical intervention, on 2025, and underwent cystoscopy, with left ureteral stone manipulation, left ureteral stent placement, and tolerated the procedure well.  She was treated  with IV antibiotics, with cefepime, and is doing well today.  He will be transitioned to cefdinir, he is oriented x 3, and will be discharged back to [facility].  He had sepsis upon admission.    DISCHARGE Follow Up Recommendations for labs and diagnostics: Follow-up with urology in 1 week    Day of Discharge     Vital Signs:  Temp:  [97.7 °F (36.5 °C)-98.3 °F (36.8 °C)] 97.8 °F (36.6 °C)  Heart Rate:  [61-93] 66  Resp:  [13-24] 16  BP: ()/(49-70) 109/63  Flow (L/min) (Oxygen Therapy):  [6] 6    Physical Exam:  General: Alert, cooperative, no distress, AAOx3  Lungs: Clear to auscultation bilaterally, respirations unlabored  Heart: Regular rate and rhythm, S1 and S2 normal, no murmurs, no rub or gallop.  No edema.  Abdomen: Soft, non-tender, bowel sounds active, no masses, no organomegaly  Neurologic: NFND  Psychiatric: Appropriate mood and affect.       Pertinent  and/or Most Recent Results     LAB RESULTS:        Lab 04/10/25  0046 04/09/25  1016 04/09/25  0852 04/09/25  0409 04/09/25  0047 04/08/25  2019 04/08/25  1915   WBC 7.93  --   --  11.10*  --   --  4.88   HEMOGLOBIN 10.7*  --   --  12.2*  --   --  13.9   HEMATOCRIT 34.0*  --   --  38.4  --   --  45.0   PLATELETS 157  --   --  167  --   --  189   NEUTROS ABS 7.15*  --   --  9.43*  --   --  4.18   IMMATURE GRANS (ABS) 0.04  --   --  0.03  --   --  0.01   LYMPHS ABS 0.58*  --   --  0.92  --   --  0.51*   MONOS ABS 0.15  --   --  0.57  --   --  0.14   EOS ABS 0.00  --   --  0.11  --   --  0.02   MCV 92.9  --   --  92.1  --   --  93.9   PROCALCITONIN  --  23.80*  --   --   --   --   --    LACTATE  --  1.8 1.6 2.2* 2.3* 2.1*  --          Lab 04/10/25  0046 04/09/25  0409 04/08/25 1915   SODIUM 136 133* 137   POTASSIUM 4.3 4.3 3.9   CHLORIDE 103 101 98   CO2 23.8 23.6 26.4   ANION GAP 9.2 8.4 12.6   BUN 15 12 10   CREATININE 0.70* 0.84 1.01   EGFR 103.5 98.0 83.6   GLUCOSE 131* 123* 117*   CALCIUM 8.5* 8.5* 9.4   MAGNESIUM  --  1.6 1.8   PHOSPHORUS  --   3.4  --    TSH  --  1.460 2.220         Lab 04/10/25  0046 04/09/25  0409 04/08/25  1915   TOTAL PROTEIN 6.1 6.1 7.4   ALBUMIN 3.1* 3.0* 3.7   GLOBULIN 3.0 3.1 3.7   ALT (SGPT) 14 19 10   AST (SGOT) 30 41* 33   BILIRUBIN 1.4* 2.2* 1.5*   ALK PHOS 75 90 121*                     Brief Urine Lab Results  (Last result in the past 365 days)        Color   Clarity   Blood   Leuk Est   Nitrite   Protein   CREAT   Urine HCG        04/08/25 1933 Dark Yellow   Hazy   Large (3+)   Small (1+)   Negative   30 mg/dL (1+)                 Microbiology Results (last 10 days)       Procedure Component Value - Date/Time    Respiratory Panel PCR w/COVID-19(SARS-CoV-2) KEON/THIAGO/MJ/PAD/COR/BIGG In-House, NP Swab in UTM/VTM, 2 HR TAT - Swab, Nasopharynx [[id number]]  (Normal) Collected: 04/08/25 1933    Lab Status: Final result Specimen: Swab from Nasopharynx Updated: 04/08/25 2027     ADENOVIRUS, PCR Not Detected     Coronavirus 229E Not Detected     Coronavirus HKU1 Not Detected     Coronavirus NL63 Not Detected     Coronavirus OC43 Not Detected     COVID19 Not Detected     Human Metapneumovirus Not Detected     Human Rhinovirus/Enterovirus Not Detected     Influenza A PCR Not Detected     Influenza B PCR Not Detected     Parainfluenza Virus 1 Not Detected     Parainfluenza Virus 2 Not Detected     Parainfluenza Virus 3 Not Detected     Parainfluenza Virus 4 Not Detected     RSV, PCR Not Detected     Bordetella pertussis pcr Not Detected     Bordetella parapertussis PCR Not Detected     Chlamydophila pneumoniae PCR Not Detected     Mycoplasma pneumo by PCR Not Detected    Narrative:      In the setting of a positive respiratory panel with a viral infection PLUS a negative procalcitonin without other underlying concern for bacterial infection, consider observing off antibiotics or discontinuation of antibiotics and continue supportive care. If the respiratory panel is positive for atypical bacterial infection (Bordetella pertussis,  Chlamydophila pneumoniae, or Mycoplasma pneumoniae), consider antibiotic de-escalation to target atypical bacterial infection.    Blood Culture - Blood, Arm, Left [[id number]]  (Normal) Collected: 04/08/25 1933    Lab Status: Preliminary result Specimen: Blood from Arm, Left Updated: 04/09/25 1946     Blood Culture No growth at 24 hours    Narrative:      Less than seven (7) mL's of blood was collected.  Insufficient quantity may yield false negative results.    Urine Culture - Urine, Straight Cath [[id number]]  (Normal) Collected: 04/08/25 1933    Lab Status: Final result Specimen: Urine from Straight Cath Updated: 04/09/25 2226     Urine Culture No growth    Blood Culture - Blood, Arm, Right [[id number]]  (Normal) Collected: 04/08/25 1915    Lab Status: Preliminary result Specimen: Blood from Arm, Right Updated: 04/09/25 1946     Blood Culture No growth at 24 hours            US Liver  Result Date: 4/9/2025  Impression: Impression: Normal liver ultrasound. Electronically Signed: Pardeep Almanzar MD  4/9/2025 12:44 PM EDT  Workstation ID: [id number]    XR Chest 1 View  Result Date: 4/9/2025  Impression: Impression: Left lower lobe pneumonia with small left pleural effusion. Electronically Signed: Pardeep Almanzar MD  4/9/2025 12:36 PM EDT  Workstation ID: [id number]    CT Head Without Contrast  Result Date: 4/9/2025  Impression: Impression: 1.No acute intracranial process identified. 2.Large area of encephalomalacia along right cerebral hemisphere. Electronically Signed: Tal Mcallister MD  4/9/2025 8:10 AM EDT  Workstation ID: [id number]    XR Abdomen KUB  Result Date: 4/9/2025  Impression: Impression: Gastric suction tube terminates in the stomach. Electronically Signed: Sulaiman Zabala MD  4/9/2025 3:16 AM EDT  Workstation ID: [id number]    CT Abdomen Pelvis With Contrast  Result Date: 4/8/2025  Impression: Impression: 1.A large stone in the left renal pelvis measuring up to 11 mm diameter. There is mild left-sided  hydronephrosis and there is moderate peripelvic fat stranding at the left kidney. This could be related to intermittent obstruction or urinary tract infection. 2.Numerous bilateral nonobstructing kidney stones. 3.Colonic diverticulosis. 4.Severe thoracolumbar spondylosis. 5.Bibasilar atelectasis with possible superimposed pneumonia. Correlate with symptoms. Electronically Signed: Sulaiman Zabala MD  4/8/2025 11:23 PM EDT  Workstation ID: [id number]    XR Chest 1 View  Result Date: 4/8/2025  Impression: Impression: No radiographic evidence of acute cardiopulmonary abnormality. Electronically Signed: Louie Crowley  4/8/2025 7:15 PM EDT  Workstation ID: [id number]                  Labs Pending at Discharge:  Pending Results       None            Procedures Performed  Procedure(s):  CYSTOSCOPY URETERAL STENT INSERTION         Consults:   Consults       Date and Time Order Name Status Description    4/9/2025  9:45 AM Inpatient Urology Consult      4/8/2025 11:44 PM Inpatient Urology Consult Completed     4/8/2025  9:01 PM Hospitalist (on-call MD unless specified)              Discharge Details        Discharge Medications        New Medications        Instructions Start Date   cefdinir 300 MG capsule  Commonly known as: OMNICEF   300 mg, Oral, 2 Times Daily             Continue These Medications        Instructions Start Date   aluminum-magnesium hydroxide-simethicone 200-200-20 MG/5ML suspension  Commonly known as: MAALOX/MYLANTA   30 mL, Oral, Every 8 Hours PRN      aspirin 81 MG chewable tablet   81 mg, Oral, Every Morning      atorvastatin 40 MG tablet  Commonly known as: LIPITOR   40 mg, Oral, Nightly      baclofen 10 MG tablet  Commonly known as: LIORESAL   10 mg, Oral, 3 Times Daily      calcium carbonate 500 MG chewable tablet  Commonly known as: TUMS   1 tablet, Oral, 3 Times Daily      carboxymethylcellulose 0.5 % solution  Commonly known as: REFRESH PLUS   2 drops, Both Eyes, 2 Times Daily      ezetimibe 10 MG  tablet  Commonly known as: ZETIA   10 mg, Oral, Every Morning      famotidine 20 MG tablet  Commonly known as: PEPCID   20 mg, Oral, 2 Times Daily      gabapentin 600 MG tablet  Commonly known as: NEURONTIN   600 mg, Oral, 3 Times Daily      naloxone 4 MG/0.1ML nasal spray  Commonly known as: NARCAN   Call 911. Don't prime. Lynn in 1 nostril for overdose. Repeat in 2-3 minutes in other nostril if no or minimal breathing/responsiveness.      ondansetron 4 MG tablet  Commonly known as: ZOFRAN   4 mg, Oral, Every 6 Hours PRN      pantoprazole 40 MG EC tablet  Commonly known as: Protonix   40 mg, Oral, 2 Times Daily      tamsulosin 0.4 MG capsule 24 hr capsule  Commonly known as: FLOMAX   1 capsule, Oral, Every Morning      white petrolatum ointment   1 Application, Topical, Every Night at Bedtime, Instill 1 application in both eyes at bedtime for eye maintenance 1/2 inch ribbon outer lid             Stop These Medications      Eucerin cream     fentaNYL 25 MCG/HR patch  Commonly known as: DURAGESIC     HYDROcodone-acetaminophen 5-325 MG per tablet  Commonly known as: NORCO     ketoconazole 2 % shampoo  Commonly known as: NIZORAL     Lidocaine 4 % aerosol     meclizine 25 MG tablet  Commonly known as: ANTIVERT     melatonin 1 MG tablet     Scopolamine 1 MG/3DAYS patch              No Known Allergies    Discharge Disposition: Skilled Nursing Facility (DC - External)    Diet: Hospital:  Diet Order   Procedures    Diet: Regular/House; Fluid Consistency: Thin (IDDSI 0)       Discharge Activity:     CODE STATUS:   Code Status and Medical Interventions: CPR (Attempt to Resuscitate); Full Support; full code presumed, patient is disoriented   Ordered at: 04/08/25 8313     Code Status (Patient has no pulse and is not breathing):    CPR (Attempt to Resuscitate)     Medical Interventions (Patient has pulse or is breathing):    Full Support     Comments:    full code presumed, patient is disoriented       No future  appointments.        Time spent on Discharge including face to face service:  34 minutes    Signature: Electronically signed by Frank Glass MD, MD, 04/10/25, 10:46 EDT.  [facility] Hospitalist Team

## 2025-04-10 NOTE — PLAN OF CARE
Goal Outcome Evaluation:               Pt plan is to discharge to facility. Pt is not complaining of any pain at this time. Safety maintained.                         Problem: Adult Inpatient Plan of Care  Goal: Plan of Care Review  Outcome: Progressing  Goal: Patient-Specific Goal (Individualized)  Outcome: Progressing  Goal: Absence of Hospital-Acquired Illness or Injury  Outcome: Progressing  Intervention: Identify and Manage Fall Risk  Description: Perform standard risk assessment on admission using a validated tool or comprehensive approach appropriate to the patient; reassess fall risk frequently, with change in status or transfer to another level of care.Communicate risk to interprofessional healthcare team; ensure fall risk visible cue.Determine need for increased observation, equipment and environmental modification, as well as use of supportive, nonskid footwear.Adjust safety measures to individual needs and identified risk factors.Reinforce the importance of active participation with fall risk prevention, safety, and physical activity with the patient and family.Perform regular intentional rounding to assess need for position change, pain assessment and personal needs, including assistance with toileting.  Recent Flowsheet Documentation  Taken 4/10/2025 1232 by Jihan Salomon RN  Safety Promotion/Fall Prevention: safety round/check completed  Taken 4/10/2025 1000 by Jihan Salomon RN  Safety Promotion/Fall Prevention: safety round/check completed  Taken 4/10/2025 0812 by Jihan Salomon, RN  Safety Promotion/Fall Prevention: safety round/check completed  Goal: Optimal Comfort and Wellbeing  Outcome: Progressing  Goal: Readiness for Transition of Care  Outcome: Progressing     Problem: Sepsis/Septic Shock  Goal: Optimal Coping  Outcome: Progressing  Goal: Absence of Bleeding  Outcome: Progressing  Goal: Blood Glucose Level Within Target Range  Outcome: Progressing  Goal: Absence of Infection Signs and  Symptoms  Outcome: Progressing  Goal: Optimal Nutrition Delivery  Outcome: Progressing     Problem: Skin Injury Risk Increased  Goal: Skin Health and Integrity  Outcome: Progressing     Problem: Fall Injury Risk  Goal: Absence of Fall and Fall-Related Injury  Outcome: Progressing  Intervention: Promote Injury-Free Environment  Description: Provide a safe, barrier-free environment that encourages independent activity.Keep care area uncluttered and well-lighted.Determine need for increased observation or monitoring.Avoid use of devices that minimize mobility, such as restraints or indwelling urinary catheter.  Recent Flowsheet Documentation  Taken 4/10/2025 1232 by Jihan Salomon, RN  Safety Promotion/Fall Prevention: safety round/check completed  Taken 4/10/2025 1000 by Jihan Salomon, RN  Safety Promotion/Fall Prevention: safety round/check completed  Taken 4/10/2025 0812 by Jihan Salomon, RN  Safety Promotion/Fall Prevention: safety round/check completed     Problem: Violence Risk or Actual  Goal: Anger and Impulse Control  Outcome: Progressing  Intervention: Minimize Safety Risk  Description: Listen actively, observing verbal and nonverbal cues (e.g., irritability, confusion, lack of cooperation, demanding behavior, body posture, expression); take threats seriously.Maintain a therapeutic presence; utilize calm, empathetic tone of voice, nonjudgmental attitude and nonthreatening body language; listen carefully.Assess and provide for unmet needs, including nutrition, comfort, hydration, hygiene, companionship and appropriate rest.Utilize empathetic but firm and concise communication; set limits, offer choices and propose alternatives.Remove stimuli and objects that may lead to harming self or others.Maintain clear path to room exit; keep door open during care.Ask directly about homicidal and suicidal intent; provide additional safety measures based on level of risk (e.g., one-on-one observation, duty to  rafael).Implement least restrictive measures if attempts to de-escalate violent or injurious behaviors are unsuccessful.  Recent Flowsheet Documentation  Taken 4/10/2025 1232 by Jihan Salomon, RN  Enhanced Safety Measures: bed alarm set  Taken 4/10/2025 1000 by Jihan Salomon, RN  Enhanced Safety Measures: bed alarm set  Taken 4/10/2025 0812 by Jihan Salomon, RN  Enhanced Safety Measures: bed alarm set     Problem: Comorbidity Management  Goal: Maintenance of Behavioral Health Symptom Control  Outcome: Progressing

## 2025-04-10 NOTE — CASE MANAGEMENT/SOCIAL WORK
Continued Stay Note  CAN Hinojosa     Patient Name: Kevin Hurtado  MRN: [id number]  Today's Date: 4/10/2025    Admit Date: 4/8/2025    Plan[place] [facility] [place].) No PASRR or precert required.   Discharge Plan       Row Name 04/10/25 0919       Plan    Plan [facility] ([place] pharmacy updated.) No PASRR or precert required.                 Claudia Osuna RN     Office phone: [phone]  Office fax: [phone]

## 2025-04-10 NOTE — PROGRESS NOTES
Enter Query Response Below      Query Response: Gram negative pneumonia (excluding Haemophilus influenzae)              If applicable, please update the problem list.

## 2025-04-10 NOTE — CASE MANAGEMENT/SOCIAL WORK
Continued Stay Note  CAN Hinojosa     Patient Name: Kevin Hurtado  MRN: [id number]  Today's Date: 4/10/2025    Admit Date: 4/8/2025    Plan[place] [facility] ([place] pharmacy updated.) No PASRR or precert required.   Discharge Plan       Row Name 04/10/25 1253       Plan    Plan Comments CM completed MNF and placed an EMS request for transport to [place].                     Claudia Osuna RN     Office phone: [phone]  Office fax: [phone]

## 2025-04-11 NOTE — CASE MANAGEMENT/SOCIAL WORK
Case Management Discharge Note      Final Note: [facility]         Selected Continued Care - Discharged on 4/10/2025 Admission date: 4/8/2025 - Discharge disposition: Skilled Nursing Facility (DC - External)      Destination Coordination complete.      Service Provider Services Address Phone Fax Patient Preferred    [facility] [place] [facility] [address] [place] [address] [phone] --                 Transportation Services  Ambulance: [facility]    Final Discharge Disposition Code: 04 - intermediate care facility

## 2025-04-13 LAB
BACTERIA SPEC AEROBE CULT: NORMAL
BACTERIA SPEC AEROBE CULT: NORMAL

## 2025-05-06 RX ORDER — FENTANYL 12.5 UG/1
1 PATCH TRANSDERMAL
COMMUNITY

## 2025-05-06 RX ORDER — AMOXICILLIN 500 MG/1
1000 CAPSULE ORAL 2 TIMES DAILY
COMMUNITY

## 2025-05-14 ENCOUNTER — ANESTHESIA (OUTPATIENT)
Dept: PERIOP | Facility: HOSPITAL | Age: 65
End: 2025-05-14
Payer: MEDICARE

## 2025-05-14 ENCOUNTER — ANESTHESIA EVENT (OUTPATIENT)
Dept: PERIOP | Facility: HOSPITAL | Age: 65
End: 2025-05-14
Payer: MEDICARE

## 2025-05-14 ENCOUNTER — HOSPITAL ENCOUNTER (OUTPATIENT)
Facility: HOSPITAL | Age: 65
Setting detail: HOSPITAL OUTPATIENT SURGERY
Discharge: HOME | End: 2025-05-14
Attending: UROLOGY | Admitting: UROLOGY
Payer: MEDICARE

## 2025-05-14 ENCOUNTER — APPOINTMENT (OUTPATIENT)
Dept: GENERAL RADIOLOGY | Facility: HOSPITAL | Age: 65
End: 2025-05-14
Payer: MEDICARE

## 2025-05-14 VITALS
OXYGEN SATURATION: 99 % | HEIGHT: 69 IN | TEMPERATURE: 97.7 F | RESPIRATION RATE: 11 BRPM | DIASTOLIC BLOOD PRESSURE: 79 MMHG | BODY MASS INDEX: 29.62 KG/M2 | SYSTOLIC BLOOD PRESSURE: 122 MMHG | HEART RATE: 67 BPM | WEIGHT: 200 LBS

## 2025-05-14 DIAGNOSIS — N20.0 LEFT NEPHROLITHIASIS: Primary | ICD-10-CM

## 2025-05-14 LAB
ABO GROUP BLD: NORMAL
ABO GROUP BLD: NORMAL
BASOPHILS # BLD AUTO: 0.03 10*3/MM3 (ref 0–0.2)
BASOPHILS NFR BLD AUTO: 0.6 % (ref 0–1.5)
BLD GP AB SCN SERPL QL: NEGATIVE
DEPRECATED RDW RBC AUTO: 50 FL (ref 37–54)
EOSINOPHIL # BLD AUTO: 0.15 10*3/MM3 (ref 0–0.4)
EOSINOPHIL NFR BLD AUTO: 3 % (ref 0.3–6.2)
ERYTHROCYTE [DISTWIDTH] IN BLOOD BY AUTOMATED COUNT: 14.7 % (ref 12.3–15.4)
HBA1C MFR BLD: 5.71 % (ref 4.8–5.6)
HCT VFR BLD AUTO: 39.6 % (ref 37.5–51)
HGB BLD-MCNC: 12.4 G/DL (ref 13–17.7)
IMM GRANULOCYTES # BLD AUTO: 0.02 10*3/MM3 (ref 0–0.05)
IMM GRANULOCYTES NFR BLD AUTO: 0.4 % (ref 0–0.5)
LYMPHOCYTES # BLD AUTO: 1.33 10*3/MM3 (ref 0.7–3.1)
LYMPHOCYTES NFR BLD AUTO: 26.4 % (ref 19.6–45.3)
MCH RBC QN AUTO: 28.9 PG (ref 26.6–33)
MCHC RBC AUTO-ENTMCNC: 31.3 G/DL (ref 31.5–35.7)
MCV RBC AUTO: 92.3 FL (ref 79–97)
MONOCYTES # BLD AUTO: 0.47 10*3/MM3 (ref 0.1–0.9)
MONOCYTES NFR BLD AUTO: 9.3 % (ref 5–12)
NEUTROPHILS NFR BLD AUTO: 3.03 10*3/MM3 (ref 1.7–7)
NEUTROPHILS NFR BLD AUTO: 60.3 % (ref 42.7–76)
NRBC BLD AUTO-RTO: 0 /100 WBC (ref 0–0.2)
PLATELET # BLD AUTO: 183 10*3/MM3 (ref 140–450)
PMV BLD AUTO: 9.6 FL (ref 6–12)
RBC # BLD AUTO: 4.29 10*6/MM3 (ref 4.14–5.8)
RH BLD: POSITIVE
RH BLD: POSITIVE
T&S EXPIRATION DATE: NORMAL
WBC NRBC COR # BLD AUTO: 5.03 10*3/MM3 (ref 3.4–10.8)

## 2025-05-14 PROCEDURE — 25010000002 ONDANSETRON PER 1 MG: Performed by: NURSE ANESTHETIST, CERTIFIED REGISTERED

## 2025-05-14 PROCEDURE — C2617 STENT, NON-COR, TEM W/O DEL: HCPCS | Performed by: UROLOGY

## 2025-05-14 PROCEDURE — 25010000002 LIDOCAINE PF 2% 2 % SOLUTION: Performed by: NURSE ANESTHETIST, CERTIFIED REGISTERED

## 2025-05-14 PROCEDURE — 25810000003 LACTATED RINGERS PER 1000 ML: Performed by: NURSE ANESTHETIST, CERTIFIED REGISTERED

## 2025-05-14 PROCEDURE — C1758 CATHETER, URETERAL: HCPCS | Performed by: UROLOGY

## 2025-05-14 PROCEDURE — 86850 RBC ANTIBODY SCREEN: CPT | Performed by: UROLOGY

## 2025-05-14 PROCEDURE — 25010000002 MEPERIDINE PER 100 MG: Performed by: NURSE ANESTHETIST, CERTIFIED REGISTERED

## 2025-05-14 PROCEDURE — C1894 INTRO/SHEATH, NON-LASER: HCPCS | Performed by: UROLOGY

## 2025-05-14 PROCEDURE — 85025 COMPLETE CBC W/AUTO DIFF WBC: CPT | Performed by: UROLOGY

## 2025-05-14 PROCEDURE — 25010000002 FENTANYL CITRATE (PF) 100 MCG/2ML SOLUTION: Performed by: NURSE ANESTHETIST, CERTIFIED REGISTERED

## 2025-05-14 PROCEDURE — 25010000002 CEFAZOLIN PER 500 MG: Performed by: NURSE ANESTHETIST, CERTIFIED REGISTERED

## 2025-05-14 PROCEDURE — 76000 FLUOROSCOPY <1 HR PHYS/QHP: CPT

## 2025-05-14 PROCEDURE — 86901 BLOOD TYPING SEROLOGIC RH(D): CPT

## 2025-05-14 PROCEDURE — 86900 BLOOD TYPING SEROLOGIC ABO: CPT | Performed by: UROLOGY

## 2025-05-14 PROCEDURE — 86901 BLOOD TYPING SEROLOGIC RH(D): CPT | Performed by: UROLOGY

## 2025-05-14 PROCEDURE — 83036 HEMOGLOBIN GLYCOSYLATED A1C: CPT | Performed by: UROLOGY

## 2025-05-14 PROCEDURE — 86900 BLOOD TYPING SEROLOGIC ABO: CPT

## 2025-05-14 PROCEDURE — 25010000002 CEFAZOLIN PER 500 MG: Performed by: UROLOGY

## 2025-05-14 PROCEDURE — 25010000002 DEXAMETHASONE PER 1 MG: Performed by: NURSE ANESTHETIST, CERTIFIED REGISTERED

## 2025-05-14 PROCEDURE — 25010000002 PROPOFOL 10 MG/ML EMULSION: Performed by: NURSE ANESTHETIST, CERTIFIED REGISTERED

## 2025-05-14 DEVICE — URETERAL STENT
Type: IMPLANTABLE DEVICE | Site: URETER | Status: FUNCTIONAL
Brand: PERCUFLEX™ PLUS

## 2025-05-14 RX ORDER — EPHEDRINE SULFATE 5 MG/ML
5 INJECTION INTRAVENOUS ONCE AS NEEDED
Status: DISCONTINUED | OUTPATIENT
Start: 2025-05-14 | End: 2025-05-14

## 2025-05-14 RX ORDER — PHENAZOPYRIDINE HYDROCHLORIDE 100 MG/1
100 TABLET, FILM COATED ORAL 3 TIMES DAILY PRN
Qty: 15 TABLET | Refills: 0 | Status: SHIPPED | OUTPATIENT
Start: 2025-05-14

## 2025-05-14 RX ORDER — CEPHALEXIN 500 MG/1
500 CAPSULE ORAL 2 TIMES DAILY
Qty: 10 CAPSULE | Refills: 0 | Status: SHIPPED | OUTPATIENT
Start: 2025-05-14 | End: 2025-05-19

## 2025-05-14 RX ORDER — ONDANSETRON 2 MG/ML
INJECTION INTRAMUSCULAR; INTRAVENOUS AS NEEDED
Status: DISCONTINUED | OUTPATIENT
Start: 2025-05-14 | End: 2025-05-14 | Stop reason: SURG

## 2025-05-14 RX ORDER — LIDOCAINE HYDROCHLORIDE 10 MG/ML
0.5 INJECTION, SOLUTION EPIDURAL; INFILTRATION; INTRACAUDAL; PERINEURAL ONCE AS NEEDED
Status: DISCONTINUED | OUTPATIENT
Start: 2025-05-14 | End: 2025-05-14 | Stop reason: HOSPADM

## 2025-05-14 RX ORDER — ONDANSETRON 2 MG/ML
4 INJECTION INTRAMUSCULAR; INTRAVENOUS ONCE AS NEEDED
Status: DISCONTINUED | OUTPATIENT
Start: 2025-05-14 | End: 2025-05-14 | Stop reason: HOSPADM

## 2025-05-14 RX ORDER — DIPHENHYDRAMINE HYDROCHLORIDE 50 MG/ML
12.5 INJECTION, SOLUTION INTRAMUSCULAR; INTRAVENOUS ONCE AS NEEDED
Status: DISCONTINUED | OUTPATIENT
Start: 2025-05-14 | End: 2025-05-14 | Stop reason: HOSPADM

## 2025-05-14 RX ORDER — LIDOCAINE HYDROCHLORIDE 20 MG/ML
INJECTION, SOLUTION EPIDURAL; INFILTRATION; INTRACAUDAL; PERINEURAL AS NEEDED
Status: DISCONTINUED | OUTPATIENT
Start: 2025-05-14 | End: 2025-05-14 | Stop reason: SURG

## 2025-05-14 RX ORDER — SODIUM CHLORIDE 0.9 % (FLUSH) 0.9 %
10 SYRINGE (ML) INJECTION AS NEEDED
Status: DISCONTINUED | OUTPATIENT
Start: 2025-05-14 | End: 2025-05-14 | Stop reason: HOSPADM

## 2025-05-14 RX ORDER — HYDROMORPHONE HYDROCHLORIDE 1 MG/ML
0.5 INJECTION, SOLUTION INTRAMUSCULAR; INTRAVENOUS; SUBCUTANEOUS
Status: DISCONTINUED | OUTPATIENT
Start: 2025-05-14 | End: 2025-05-14 | Stop reason: HOSPADM

## 2025-05-14 RX ORDER — LABETALOL HYDROCHLORIDE 5 MG/ML
5 INJECTION, SOLUTION INTRAVENOUS
Status: DISCONTINUED | OUTPATIENT
Start: 2025-05-14 | End: 2025-05-14 | Stop reason: HOSPADM

## 2025-05-14 RX ORDER — FENTANYL CITRATE 50 UG/ML
INJECTION, SOLUTION INTRAMUSCULAR; INTRAVENOUS AS NEEDED
Status: DISCONTINUED | OUTPATIENT
Start: 2025-05-14 | End: 2025-05-14 | Stop reason: SURG

## 2025-05-14 RX ORDER — DOCUSATE SODIUM 100 MG/1
100 CAPSULE, LIQUID FILLED ORAL 2 TIMES DAILY PRN
Qty: 30 CAPSULE | Refills: 1 | Status: SHIPPED | OUTPATIENT
Start: 2025-05-14

## 2025-05-14 RX ORDER — DEXAMETHASONE SODIUM PHOSPHATE 4 MG/ML
INJECTION, SOLUTION INTRA-ARTICULAR; INTRALESIONAL; INTRAMUSCULAR; INTRAVENOUS; SOFT TISSUE AS NEEDED
Status: DISCONTINUED | OUTPATIENT
Start: 2025-05-14 | End: 2025-05-14 | Stop reason: SURG

## 2025-05-14 RX ORDER — FENTANYL CITRATE 50 UG/ML
50 INJECTION, SOLUTION INTRAMUSCULAR; INTRAVENOUS
Status: DISCONTINUED | OUTPATIENT
Start: 2025-05-14 | End: 2025-05-14 | Stop reason: HOSPADM

## 2025-05-14 RX ORDER — HYDRALAZINE HYDROCHLORIDE 20 MG/ML
5 INJECTION INTRAMUSCULAR; INTRAVENOUS
Status: DISCONTINUED | OUTPATIENT
Start: 2025-05-14 | End: 2025-05-14 | Stop reason: HOSPADM

## 2025-05-14 RX ORDER — HYDRALAZINE HYDROCHLORIDE 20 MG/ML
5 INJECTION INTRAMUSCULAR; INTRAVENOUS
Status: DISCONTINUED | OUTPATIENT
Start: 2025-05-14 | End: 2025-05-14 | Stop reason: SDUPTHER

## 2025-05-14 RX ORDER — OXYCODONE HYDROCHLORIDE 5 MG/1
10 TABLET ORAL EVERY 4 HOURS PRN
Refills: 0 | Status: DISCONTINUED | OUTPATIENT
Start: 2025-05-14 | End: 2025-05-14 | Stop reason: HOSPADM

## 2025-05-14 RX ORDER — IPRATROPIUM BROMIDE AND ALBUTEROL SULFATE 2.5; .5 MG/3ML; MG/3ML
3 SOLUTION RESPIRATORY (INHALATION) ONCE AS NEEDED
Status: DISCONTINUED | OUTPATIENT
Start: 2025-05-14 | End: 2025-05-14 | Stop reason: HOSPADM

## 2025-05-14 RX ORDER — NALOXONE HCL 0.4 MG/ML
0.4 VIAL (ML) INJECTION AS NEEDED
Status: DISCONTINUED | OUTPATIENT
Start: 2025-05-14 | End: 2025-05-14 | Stop reason: HOSPADM

## 2025-05-14 RX ORDER — DIPHENHYDRAMINE HYDROCHLORIDE 50 MG/ML
12.5 INJECTION, SOLUTION INTRAMUSCULAR; INTRAVENOUS
Status: DISCONTINUED | OUTPATIENT
Start: 2025-05-14 | End: 2025-05-14 | Stop reason: HOSPADM

## 2025-05-14 RX ORDER — LABETALOL HYDROCHLORIDE 5 MG/ML
5 INJECTION, SOLUTION INTRAVENOUS
Status: DISCONTINUED | OUTPATIENT
Start: 2025-05-14 | End: 2025-05-14 | Stop reason: SDUPTHER

## 2025-05-14 RX ORDER — DIPHENHYDRAMINE HYDROCHLORIDE 50 MG/ML
12.5 INJECTION, SOLUTION INTRAMUSCULAR; INTRAVENOUS
Status: DISCONTINUED | OUTPATIENT
Start: 2025-05-14 | End: 2025-05-14 | Stop reason: SDUPTHER

## 2025-05-14 RX ORDER — EPHEDRINE SULFATE 5 MG/ML
5 INJECTION INTRAVENOUS ONCE AS NEEDED
Status: DISCONTINUED | OUTPATIENT
Start: 2025-05-14 | End: 2025-05-14 | Stop reason: HOSPADM

## 2025-05-14 RX ORDER — IPRATROPIUM BROMIDE AND ALBUTEROL SULFATE 2.5; .5 MG/3ML; MG/3ML
3 SOLUTION RESPIRATORY (INHALATION) ONCE AS NEEDED
Status: DISCONTINUED | OUTPATIENT
Start: 2025-05-14 | End: 2025-05-14 | Stop reason: SDUPTHER

## 2025-05-14 RX ORDER — MEPERIDINE HYDROCHLORIDE 25 MG/ML
12.5 INJECTION INTRAMUSCULAR; INTRAVENOUS; SUBCUTANEOUS
Status: DISCONTINUED | OUTPATIENT
Start: 2025-05-14 | End: 2025-05-14 | Stop reason: HOSPADM

## 2025-05-14 RX ORDER — OXYCODONE HYDROCHLORIDE 5 MG/1
5 TABLET ORAL ONCE AS NEEDED
Refills: 0 | Status: COMPLETED | OUTPATIENT
Start: 2025-05-14 | End: 2025-05-14

## 2025-05-14 RX ORDER — SODIUM CHLORIDE, SODIUM LACTATE, POTASSIUM CHLORIDE, CALCIUM CHLORIDE 600; 310; 30; 20 MG/100ML; MG/100ML; MG/100ML; MG/100ML
1000 INJECTION, SOLUTION INTRAVENOUS ONCE
Status: DISCONTINUED | OUTPATIENT
Start: 2025-05-14 | End: 2025-05-14 | Stop reason: HOSPADM

## 2025-05-14 RX ORDER — FLUMAZENIL 0.1 MG/ML
0.2 INJECTION INTRAVENOUS AS NEEDED
Status: DISCONTINUED | OUTPATIENT
Start: 2025-05-14 | End: 2025-05-14 | Stop reason: HOSPADM

## 2025-05-14 RX ORDER — PROPOFOL 10 MG/ML
VIAL (ML) INTRAVENOUS AS NEEDED
Status: DISCONTINUED | OUTPATIENT
Start: 2025-05-14 | End: 2025-05-14 | Stop reason: SURG

## 2025-05-14 RX ORDER — SODIUM CHLORIDE, SODIUM LACTATE, POTASSIUM CHLORIDE, CALCIUM CHLORIDE 600; 310; 30; 20 MG/100ML; MG/100ML; MG/100ML; MG/100ML
INJECTION, SOLUTION INTRAVENOUS CONTINUOUS PRN
Status: DISCONTINUED | OUTPATIENT
Start: 2025-05-14 | End: 2025-05-14 | Stop reason: SURG

## 2025-05-14 RX ADMIN — PROPOFOL 50 MG: 10 INJECTION, EMULSION INTRAVENOUS at 13:21

## 2025-05-14 RX ADMIN — MEPERIDINE HYDROCHLORIDE 12.5 MG: 25 INJECTION INTRAMUSCULAR; INTRAVENOUS; SUBCUTANEOUS at 14:34

## 2025-05-14 RX ADMIN — DEXAMETHASONE SODIUM PHOSPHATE 8 MG: 4 INJECTION, SOLUTION INTRAMUSCULAR; INTRAVENOUS at 13:21

## 2025-05-14 RX ADMIN — OXYCODONE 5 MG: 5 TABLET ORAL at 14:34

## 2025-05-14 RX ADMIN — PROPOFOL 150 MG: 10 INJECTION, EMULSION INTRAVENOUS at 13:06

## 2025-05-14 RX ADMIN — ONDANSETRON 4 MG: 2 INJECTION, SOLUTION INTRAMUSCULAR; INTRAVENOUS at 13:21

## 2025-05-14 RX ADMIN — FENTANYL CITRATE 100 MCG: 50 INJECTION, SOLUTION INTRAMUSCULAR; INTRAVENOUS at 13:06

## 2025-05-14 RX ADMIN — SODIUM CHLORIDE, SODIUM LACTATE, POTASSIUM CHLORIDE, AND CALCIUM CHLORIDE: .6; .31; .03; .02 INJECTION, SOLUTION INTRAVENOUS at 12:59

## 2025-05-14 RX ADMIN — CEFAZOLIN 2000 MG: 2 INJECTION, POWDER, FOR SOLUTION INTRAMUSCULAR; INTRAVENOUS at 12:53

## 2025-05-14 RX ADMIN — LIDOCAINE HYDROCHLORIDE 50 MG: 20 INJECTION, SOLUTION EPIDURAL; INFILTRATION; INTRACAUDAL; PERINEURAL at 13:06

## 2025-05-14 RX ADMIN — CEFAZOLIN 2 G: 2 INJECTION, POWDER, FOR SOLUTION INTRAMUSCULAR; INTRAVENOUS at 13:10

## 2025-05-14 NOTE — ANESTHESIA PROCEDURE NOTES
Airway  Date/Time: 5/14/2025 1:06 PM  Airway not difficult    General Information and Staff    Anesthesiologist: Blayne Freire MD  CRNA/CAA: El Bhat CRNA    Indications and Patient Condition  Indications for airway management: airway protection    Preoxygenated: yes  MILS maintained throughout    Mask difficulty assessment: 0 - not attempted    Final Airway Details    Final airway type: supraglottic airway      Successful airway: I-gel  Size: 4   Number of attempts at approach: 1  Assessment: lips, teeth, and gum same as pre-op and atraumatic intubation

## 2025-05-14 NOTE — ANESTHESIA POSTPROCEDURE EVALUATION
Patient: Kevin Hurtado    Procedure Summary       Date: 05/14/25 Room / Location: [facility] OR 06 / [facility] MAIN OR    Anesthesia Start: 1259 Anesthesia Stop: 1416    Procedure: CYSTOSCOPY URETEROSCOPY LASER LITHOTRIPSY, STONE BASKET EXTRACTION, STENT EXCHANGE (Left) Diagnosis:       Calculus, renal      Ureteral calculus      (Calculus, renal [N20.0])      (Ureteral calculus [N20.1])    Surgeons: Sulaiman Loza MD Provider: Blayne Freire MD    Anesthesia Type: general ASA Status: 3            Anesthesia Type: general    Vitals  Vitals Value Taken Time   /63 05/14/25 14:20   Temp     Pulse 97 05/14/25 14:24   Resp     SpO2 97 % 05/14/25 14:24   Vitals shown include unfiled device data.        Post Anesthesia Care and Evaluation    Patient location during evaluation: PACU  Patient participation: complete - patient participated  Level of consciousness: awake  Pain scale: See nurse's notes for pain score.  Pain management: adequate    Airway patency: patent  Anesthetic complications: No anesthetic complications  PONV Status: none  Cardiovascular status: acceptable  Respiratory status: acceptable and spontaneous ventilation  Hydration status: acceptable    Comments: Patient seen and examined postoperatively; vital signs stable; SpO2 greater than or equal to 90%; cardiopulmonary status stable; nausea/vomiting adequately controlled; pain adequately controlled; no apparent anesthesia complications; patient discharged from anesthesia care when discharge criteria were met

## 2025-05-14 NOTE — OP NOTE
Urology Operative Note    5/14/2025    Kevin Hurtado  64 y.o.  1960  male  [id number]      Surgeon(s) and Role:  Sulaiman Loza MD - Primary     Pre-operative Diagnosis: Left renal and ureteral stones    Post-operative Diagnosis: Same    Complications: None    Procedures:  Cystoscopy  Left ureteroscopy  Laser Lithotripsy  Basket-extraction of stone fragments  Stent replacement  Fluoroscopy with Interpretation     Indications   Kevin Hurtado is a 64 y.o. male who was found to have 11 mm left UPJ stone and multiple bilateral renal stones underwent stent placement then ESWL here for planned staged procedure to try to clear all of his left renal stone burden    During the informed consent process, the procedure was discussed in detail including the risks of bleeding, infection, damage to surrounding structures and need for staged procedure.      Findings     Fluoroscopy with interpretation: Stent removed wire placed the upper pole.  Access sheath at the UPJ.  Stent replaced with curl in the kidney    Description of procedure:  The patient was properly identified in the preoperative holding area and taken to the operating room where general anesthesia was induced. The patient was placed in the cystolithotomy position and prepped and draped in a sterile fashion. The patient was given antibiotics intravenously before the start of the surgery. After ensuring that all of the required equipment was ready and available a surgical timeout was performed.     A 21 Maltese rigid cystoscope was inserted into the bladder under direct visualization.  Patient's contractures resulted in high bladder neck making visualization of the stent difficult but was able to be grasped and removed.  A Sensor wire was passed up the ureter under fluoroscopic guidance.  Semirigid ureteroscope was not attempted due to his anatomy.  Flexible ureteroscope was advanced up alongside the wire and the ureter was clear of any stones so a 12 x 14 x  46 cm Prydeinig sheath was placed.  Flexible ureteroscopy was then performed numerous large fragments of stone were seen and lasered into smaller pieces.  Basket was then used in innumerable trips up and down the sheath were done to clear any significant stone burden.  By the end of the procedure there were numerous stones left but all in small fragments.  The scope was slowly backed out and the ureter was in good shape and the wire was replaced.  The cystoscope was then replaced over the wire and a 8 Prydeinig x 26cm stent was placed under direct and fluoroscopic visualization.  The bladder was then emptied and scope removed.    There were no apparent complications. The patient woke up in the operating room and was taken to the recovery room in stable condition.     I was present and scrubbed for the entire procedure.     Specimens: None    Estimated Blood Loss: minimal      Plan   -Follow up with Dr. Loza next week for stent removal         Sulaiman Loza MD  [facility]  [facility], [address]  [place] [address] [phone]

## 2025-05-14 NOTE — ANESTHESIA PREPROCEDURE EVALUATION
Anesthesia Evaluation     Patient summary reviewed and Nursing notes reviewed   NPO Solid Status: > 8 hours  NPO Liquid Status: > 8 hours           Airway   Mallampati: II  TM distance: >3 FB  Neck ROM: full  No difficulty expected  Dental    (+) poor dentition    Pulmonary    (+) pneumonia ,  Cardiovascular     (+) hyperlipidemia      Neuro/Psych  (+) CVA residual symptoms  GI/Hepatic/Renal/Endo    (+) GERD, renal disease-    Musculoskeletal     Abdominal    Substance History      OB/GYN          Other        ROS/Med Hx Other: 2022 echo  Calculated left ventricular ejection fraction of 46 % (Biplane Torres's   method).   Mild global left ventricular hypokinesis.                       Anesthesia Plan    ASA 3     general     intravenous induction     Anesthetic plan, risks, benefits, and alternatives have been provided, discussed and informed consent has been obtained with: patient.  Pre-procedure education provided  Plan discussed with CRNA.        CODE STATUS:

## (undated) DEVICE — KT SURG TURNOVER 050

## (undated) DEVICE — PK CYSTO 50

## (undated) DEVICE — PREP SPRY PVPI 10P 2OZ

## (undated) DEVICE — SINGLE-USE BIOPSY FORCEPS: Brand: RADIAL JAW 4

## (undated) DEVICE — FIBR LASR HOLMIUM 200 MICRON DISP

## (undated) DEVICE — PK ENDO GI 50

## (undated) DEVICE — URETERAL ACCESS SHEATH SET: Brand: NAVIGATOR HD

## (undated) DEVICE — NITINOL WIRE WITH HYDROPHILIC TIP: Brand: SENSOR

## (undated) DEVICE — NITINOL STONE RETRIEVAL BASKET: Brand: ZERO TIP

## (undated) DEVICE — DUAL LUMEN URETERAL CATHETER

## (undated) DEVICE — SOL IRR H2O BO 1000ML STRL

## (undated) DEVICE — GLOVE,SURG,SENSICARE SLT,LF,PF,7: Brand: MEDLINE

## (undated) DEVICE — SOL IRRG H2O BG 3000ML STRL

## (undated) DEVICE — TUBING, SUCTION, 1/4" X 12', STRAIGHT: Brand: MEDLINE

## (undated) DEVICE — SOL IRR NACL 0.9PCT 3000ML

## (undated) DEVICE — BITEBLOCK ENDO W/STRAP 60F A/ LF DISP